# Patient Record
Sex: FEMALE | Race: WHITE | NOT HISPANIC OR LATINO | Employment: OTHER | URBAN - METROPOLITAN AREA
[De-identification: names, ages, dates, MRNs, and addresses within clinical notes are randomized per-mention and may not be internally consistent; named-entity substitution may affect disease eponyms.]

---

## 2021-03-02 ENCOUNTER — IMMUNIZATIONS (OUTPATIENT)
Dept: FAMILY MEDICINE CLINIC | Facility: HOSPITAL | Age: 76
End: 2021-03-02

## 2021-03-02 DIAGNOSIS — Z23 ENCOUNTER FOR IMMUNIZATION: Primary | ICD-10-CM

## 2021-03-02 PROCEDURE — 0001A SARS-COV-2 / COVID-19 MRNA VACCINE (PFIZER-BIONTECH) 30 MCG: CPT

## 2021-03-02 PROCEDURE — 91300 SARS-COV-2 / COVID-19 MRNA VACCINE (PFIZER-BIONTECH) 30 MCG: CPT

## 2021-03-08 ENCOUNTER — OFFICE VISIT (OUTPATIENT)
Dept: OBGYN CLINIC | Facility: CLINIC | Age: 76
End: 2021-03-08
Payer: MEDICARE

## 2021-03-08 VITALS
WEIGHT: 161 LBS | HEART RATE: 98 BPM | SYSTOLIC BLOOD PRESSURE: 151 MMHG | DIASTOLIC BLOOD PRESSURE: 78 MMHG | HEIGHT: 62 IN | BODY MASS INDEX: 29.63 KG/M2

## 2021-03-08 DIAGNOSIS — M25.511 ACUTE PAIN OF RIGHT SHOULDER: Primary | ICD-10-CM

## 2021-03-08 PROBLEM — G89.29 CHRONIC RIGHT SHOULDER PAIN: Status: ACTIVE | Noted: 2021-03-08

## 2021-03-08 PROCEDURE — 99203 OFFICE O/P NEW LOW 30 MIN: CPT | Performed by: PHYSICAL MEDICINE & REHABILITATION

## 2021-03-08 RX ORDER — METOPROLOL SUCCINATE 50 MG/1
50 TABLET, EXTENDED RELEASE ORAL
COMMUNITY
Start: 2020-12-28

## 2021-03-08 RX ORDER — BACITRACIN, NEOMYCIN, POLYMYXIN B 400; 3.5; 5 [USP'U]/G; MG/G; [USP'U]/G
OINTMENT TOPICAL 2 TIMES DAILY
COMMUNITY
End: 2021-10-13

## 2021-03-08 RX ORDER — ALPRAZOLAM 0.5 MG/1
0.5 TABLET ORAL AS NEEDED
COMMUNITY
Start: 2020-12-15

## 2021-03-08 RX ORDER — ATORVASTATIN CALCIUM 20 MG/1
20 TABLET, FILM COATED ORAL
COMMUNITY
Start: 2020-11-06

## 2021-03-08 RX ORDER — DIPHENOXYLATE HYDROCHLORIDE AND ATROPINE SULFATE 2.5; .025 MG/1; MG/1
1 TABLET ORAL DAILY
COMMUNITY
End: 2021-11-11 | Stop reason: HOSPADM

## 2021-03-08 RX ORDER — LEVOTHYROXINE SODIUM 0.03 MG/1
25 TABLET ORAL
COMMUNITY
Start: 2020-10-23

## 2021-03-08 RX ORDER — DOXYCYCLINE HYCLATE 50 MG/1
50 CAPSULE ORAL 2 TIMES DAILY
COMMUNITY
End: 2021-10-13

## 2021-03-08 RX ORDER — NABUMETONE 750 MG/1
750 TABLET, FILM COATED ORAL 2 TIMES DAILY
COMMUNITY
Start: 2020-10-27 | End: 2021-10-13

## 2021-03-08 NOTE — PROGRESS NOTES
1  Acute pain of right shoulder       No orders of the defined types were placed in this encounter  Impression:  Right shoulder pain likely secondary to subacromial impingement syndrome  Patient is doing quite well  She is doing well enough that we decided to hold off on formal physical therapy  She will continue with home exercise program   I will see her back in 3 weeks to reassess  Return in about 3 weeks (around 3/29/2021)  HPI:  Manju Otero is a 66 y o  female  who presents for evaluation of   Chief Complaint   Patient presents with    Right Shoulder - Pain       Onset/Mechanism: Pain started early March after a fall onto her outstretched shoulder  Location: Anterolateral shoulder  Radiation: Denies  Provocative: Overhead activities before but now feels 100%  Severity: Feels a lot better  Associated Symptoms: No limitations now  Treatment so far: Nabumetone which makes her feel 100%  Review of Systems   Constitutional: Positive for activity change  Negative for fever  HENT: Negative for sore throat  Eyes: Negative for visual disturbance  Respiratory: Negative for shortness of breath  Cardiovascular: Negative for chest pain  Gastrointestinal: Negative for abdominal pain  Endocrine: Negative for polydipsia  Genitourinary: Negative for difficulty urinating  Musculoskeletal: Positive for arthralgias  Skin: Negative for rash  Allergic/Immunologic: Negative for immunocompromised state  Neurological: Negative for numbness  Hematological: Does not bruise/bleed easily  Psychiatric/Behavioral: Negative for confusion  Following history reviewed and updated:  History reviewed  No pertinent past medical history  History reviewed  No pertinent surgical history    Social History   Social History     Substance and Sexual Activity   Alcohol Use None     Social History     Substance and Sexual Activity   Drug Use Not on file     Social History     Tobacco Use   Smoking Status Former Smoker   Smokeless Tobacco Never Used     History reviewed  No pertinent family history  No Known Allergies     Constitutional:  /78   Pulse 98   Ht 5' 2" (1 575 m)   Wt 73 kg (161 lb)   BMI 29 45 kg/m²    General: NAD  Eyes: Anicteric sclerae  Neck: Supple  Lungs: Unlabored breathing  Cardiovascular: No lower extremity edema  Skin: Intact without erythema  Neurologic: Sensation intact to light touch  Psychiatric: Mood and affect are appropriate  Right Shoulder Exam     Tenderness   The patient is experiencing tenderness in the acromion  Range of Motion   Active abduction: normal   Passive abduction: normal   Extension: abnormal   Forward flexion: normal   Internal rotation 0 degrees: abnormal     Tests   De Paz test: positive  Impingement: positive    Other   Erythema: absent  Scars: absent  Sensation: normal  Pulse: present             Procedures    Imaging Studies (I personally reviewed images in PACS and report):  Right shoulder x-rays most recent to this encounter reviewed  These images show mild glenohumeral joint osteoarthritis  No acute osseous abnormalities

## 2021-03-23 ENCOUNTER — IMMUNIZATIONS (OUTPATIENT)
Dept: FAMILY MEDICINE CLINIC | Facility: HOSPITAL | Age: 76
End: 2021-03-23

## 2021-03-23 DIAGNOSIS — Z23 ENCOUNTER FOR IMMUNIZATION: Primary | ICD-10-CM

## 2021-03-23 PROCEDURE — 91300 SARS-COV-2 / COVID-19 MRNA VACCINE (PFIZER-BIONTECH) 30 MCG: CPT

## 2021-03-23 PROCEDURE — 0002A SARS-COV-2 / COVID-19 MRNA VACCINE (PFIZER-BIONTECH) 30 MCG: CPT

## 2021-09-28 ENCOUNTER — OFFICE VISIT (OUTPATIENT)
Dept: NEUROSURGERY | Facility: CLINIC | Age: 76
End: 2021-09-28
Payer: MEDICARE

## 2021-09-28 VITALS
DIASTOLIC BLOOD PRESSURE: 93 MMHG | WEIGHT: 166 LBS | RESPIRATION RATE: 16 BRPM | SYSTOLIC BLOOD PRESSURE: 156 MMHG | BODY MASS INDEX: 30.55 KG/M2 | HEIGHT: 62 IN | HEART RATE: 89 BPM | TEMPERATURE: 99.2 F

## 2021-09-28 DIAGNOSIS — M43.16 SPONDYLOLISTHESIS OF LUMBAR REGION: Primary | ICD-10-CM

## 2021-09-28 DIAGNOSIS — M48.062 NEUROGENIC CLAUDICATION DUE TO LUMBAR SPINAL STENOSIS: ICD-10-CM

## 2021-09-28 DIAGNOSIS — M54.50 LOWER BACK PAIN: ICD-10-CM

## 2021-09-28 DIAGNOSIS — M48.061 SPINAL STENOSIS AT L4-L5 LEVEL: ICD-10-CM

## 2021-09-28 DIAGNOSIS — M41.9 SCOLIOSIS DEFORMITY OF SPINE: ICD-10-CM

## 2021-09-28 PROCEDURE — 99203 OFFICE O/P NEW LOW 30 MIN: CPT | Performed by: NEUROLOGICAL SURGERY

## 2021-09-28 RX ORDER — CEFAZOLIN SODIUM 2 G/50ML
2000 SOLUTION INTRAVENOUS ONCE
Status: CANCELLED | OUTPATIENT
Start: 2021-09-28 | End: 2021-09-28

## 2021-09-28 NOTE — PROGRESS NOTES
DISCUSSION SUMMARY  This is a 64 y o  female with severe low back pain and leg pain which is sometimes greater on the right and sometimes greater on the left  Imaging studies demonstrate spondylolisthesis with severe spinal stenosis  I have recommended that she undergo a decompression of procedure as well as a fusion  She has failed conservative measures  The risks, benefits and complications of surgery were explained in detail to Vick Leyva and her  including hemorrhage, infection, CSF leakage, wound problems, pain, weakness, numbness, dysesthesiae, paralysis, coma, and death  Also, the possibility  of further surgery being required was emphasized  Other potential medical complications were outlined, including deep venous thrombosis, pulmonary embolism, pneumonia, urinary tract infection, myocardial infarction,  and stroke  The need for physical therapy, occupational therapy, and rehabilitation was also mentioned  The alternatives to surgery were discussed  Vick Leyva her  asked relevant questions and asked that we proceed with arrangements for surgery  Return for Surgical intervention  Diagnosis ICD-10-CM Associated Orders   1  Spondylolisthesis of lumbar region  M43 16 Case request operating room: Minimally invasive transverse lumbar interbody fusion L4-5 left-sided approach with decompressive laminectomy     PAT Covid Screening     Case request operating room: Minimally invasive transverse lumbar interbody fusion L4-5 left-sided approach with decompressive laminectomy   2  Lower back pain  M54 5 Ambulatory referral to Neurosurgery     Case request operating room: Minimally invasive transverse lumbar interbody fusion L4-5 left-sided approach with decompressive laminectomy     PAT Covid Screening     Case request operating room: Minimally invasive transverse lumbar interbody fusion L4-5 left-sided approach with decompressive laminectomy   3   Spinal stenosis at L4-L5 level  M48 061 Case request operating room: Minimally invasive transverse lumbar interbody fusion L4-5 left-sided approach with decompressive laminectomy     PAT Covid Screening     Case request operating room: Minimally invasive transverse lumbar interbody fusion L4-5 left-sided approach with decompressive laminectomy   4  Neurogenic claudication due to lumbar spinal stenosis  M48 062 Case request operating room: Minimally invasive transverse lumbar interbody fusion L4-5 left-sided approach with decompressive laminectomy     PAT Covid Screening     Case request operating room: Minimally invasive transverse lumbar interbody fusion L4-5 left-sided approach with decompressive laminectomy   5  Scoliosis deformity of spine  M41 9 Case request operating room: Minimally invasive transverse lumbar interbody fusion L4-5 left-sided approach with decompressive laminectomy     PAT Covid Screening     Case request operating room: Minimally invasive transverse lumbar interbody fusion L4-5 left-sided approach with decompressive laminectomy          Chief Complaint   Patient presents with    Consult     NP for 2nd Opinion regarding Lower back pain; MRI L-Spine 6/29/21 uploaded to PACS       HPI  This is a 53-year-old female who complains of low back pain with pain radiating into the left and right leg  Her leg has given out on multiple occasions and her  has had to stop her from falling  Her pain radiates into the right leg but is well in the left leg  Her left leg is weaker than the right  The pain begins 1st thing in the morning when she gets out of bed and puts pressure on her leg but also at the end of the day and during significant walks  She does have back pain as well as back pain with radiation  Walking causes increased leg weakness over short periods of time  She has tried epidural steroid injections which were ineffective for her  She has tried physical therapy which also produced no relief      Review of Systems   Constitutional: Positive for activity change (decrease due to pain, relies on  a lot)  HENT: Positive for hearing loss (b/l hearing loss and wears earing aids) and trouble swallowing (occasional )  Eyes: Positive for photophobia (sensitivity to bright sunlight)  Respiratory: Negative  Cardiovascular: Negative  Gastrointestinal: Negative  Endocrine: Positive for heat intolerance  Genitourinary: Positive for frequency  Musculoskeletal: Positive for arthralgias (arthritis in lower back), back pain (intermittent, b/l lower back pain and pressure which radiates to b/l buttocks and BLE; worsens with sitting and physical activity ; has some relief when laying down ), gait problem (difficulty walking due to B/l leg weakness and pain ) and myalgias (posterior B/l legs R>L)  Patient has done WALTER twice and reported no relief  Patient has done (2 months) PT and reports no relief    Skin: Negative  Allergic/Immunologic: Negative  Neurological: Positive for weakness (intermittent BLE)  Negative for speech difficulty (slight due to hearing loss)  Hematological: Negative  Psychiatric/Behavioral: Positive for confusion (STM loss)  I reviewed the ROS      Vitals:    /93 (BP Location: Left arm, Patient Position: Sitting, Cuff Size: Standard)   Pulse 89   Temp 99 2 °F (37 3 °C) (Probe)   Resp 16   Ht 5' 2" (1 575 m)   Wt 75 3 kg (166 lb)   BMI 30 36 kg/m²       MEDICAL HISTORY  Past Medical History:   Diagnosis Date    Low back pain     Tubal pregnancy      Past Surgical History:   Procedure Laterality Date    NEUROPLASTY / TRANSPOSITION MEDIAN NERVE AT CARPAL TUNNEL BILATERAL      OSTEOTOMY TOES Left     2 and 3rd phalanges     Social History     Tobacco Use    Smoking status: Former Smoker    Smokeless tobacco: Never Used    Tobacco comment: quit 40 years ago   Vaping Use    Vaping Use: Never used   Substance Use Topics    Alcohol use: Not Currently    Drug use: Not Currently        Current Outpatient Medications:     alendronate-cholecalciferol (FOSAMAX PLUS D)  MG-UNIT per tablet, Take 1 tablet by mouth Once a week, Disp: , Rfl:     ALPRAZolam (XANAX) 0 5 mg tablet, TAKE 1 TABLET BY MOUTH  NIGHTLY AS NEEDED FOR  ANXIETY, Disp: , Rfl:     atorvastatin (LIPITOR) 20 mg tablet, Take 20 mg by mouth daily, Disp: , Rfl:     Calcium Acetate, Phos Binder, (CALCIUM ACETATE PO), Take 1 tablet by mouth daily, Disp: , Rfl:     Cholecalciferol 10 MCG (400 UNIT) CAPS, Take 1 tablet by mouth daily, Disp: , Rfl:     levothyroxine 25 mcg tablet, TAKE 1 TABLET BY MOUTH  DAILY INDICATIONS: A  CONDITION WITH LOW THYROID  HORMONE LEVELS , Disp: , Rfl:     metoprolol succinate (TOPROL-XL) 50 mg 24 hr tablet, Take 50 mg by mouth daily, Disp: , Rfl:     multivitamin (THERAGRAN) TABS, Take 1 tablet by mouth daily, Disp: , Rfl:     diclofenac sodium (VOLTAREN) 50 mg EC tablet, TAKE ONE (1) TABLET BY MOUTH TWICE DAILY AS NEEDED FOR PAIN (Patient not taking: Reported on 9/28/2021), Disp: , Rfl:     doxycycline hyclate (VIBRAMYCIN) 50 mg capsule, Take 50 mg by mouth 2 (two) times a day (Patient not taking: Reported on 9/28/2021), Disp: , Rfl:     nabumetone (RELAFEN) 750 mg tablet, Take 750 mg by mouth 2 (two) times a day (Patient not taking: Reported on 9/28/2021), Disp: , Rfl:     neomycin-bacitracin-polymyxin b (NEOSPORIN) ointment, Apply topically 2 (two) times a day (Patient not taking: Reported on 9/28/2021), Disp: , Rfl:    No Known Allergies     The following portions of the patient's history were updated by MA and reviewed by MD: allergies, current medications, past family history, past medical history, past social history, past surgical history and problem list       Physical Exam  Vitals and nursing note reviewed  Constitutional:       General: She is not in acute distress  Appearance: Normal appearance   She is not ill-appearing, toxic-appearing or diaphoretic  HENT:      Head: Normocephalic  Nose: Nose normal    Eyes:      Extraocular Movements: Extraocular movements intact  Pupils: Pupils are equal, round, and reactive to light  Cardiovascular:      Rate and Rhythm: Normal rate and regular rhythm  Pulses: Normal pulses  Heart sounds: Normal heart sounds  No murmur heard  No friction rub  No gallop  Pulmonary:      Effort: Pulmonary effort is normal  No respiratory distress  Breath sounds: Normal breath sounds  No stridor  No wheezing, rhonchi or rales  Chest:      Chest wall: No tenderness  Abdominal:      General: Abdomen is flat  There is no distension  Palpations: Abdomen is soft  There is no mass  Tenderness: There is no abdominal tenderness  There is no guarding or rebound  Hernia: No hernia is present  Musculoskeletal:         General: No swelling, tenderness, deformity or signs of injury  Cervical back: Normal range of motion and neck supple  No rigidity  Right lower leg: No edema  Left lower leg: No edema  Comments: She has a straight-leg-raising sign on the left side she has no straight leg raising sign on the right   Lymphadenopathy:      Cervical: No cervical adenopathy  Skin:     General: Skin is warm and dry  Coloration: Skin is not jaundiced  Findings: No erythema or rash  Neurological:      Mental Status: She is alert and oriented to person, place, and time  Cranial Nerves: No cranial nerve deficit  Sensory: Sensory deficit ( there is reduced deep vibratory sensation at the left ankle and at the right   The left side is worse than the right ) present  Motor: No weakness (She cannot perform tandem gait)  Coordination: Coordination normal       Gait: Gait abnormal       Deep Tendon Reflexes: Reflexes abnormal    Psychiatric:         Mood and Affect: Mood normal          Behavior: Behavior normal          Thought Content:  Thought content normal          Judgment: Judgment normal            RESULTS/DATA  I have personally reviewed pertinent films in PACS   MRI of the LS spine demonstrates degenerative disc disease with a degenerative spondylolisthesis at the L4-5 level  There is an auto fusion present at the L5-S1 level  Various degrees of lumbar spondylosis and degenerative disease are also present  There is a scoliosis present also

## 2021-09-30 ENCOUNTER — APPOINTMENT (OUTPATIENT)
Dept: LAB | Facility: HOSPITAL | Age: 76
End: 2021-09-30
Payer: MEDICARE

## 2021-09-30 ENCOUNTER — LAB REQUISITION (OUTPATIENT)
Dept: LAB | Facility: HOSPITAL | Age: 76
End: 2021-09-30
Payer: MEDICARE

## 2021-09-30 DIAGNOSIS — M43.16 SPONDYLOLISTHESIS OF LUMBAR REGION: ICD-10-CM

## 2021-09-30 DIAGNOSIS — M48.062 NEUROGENIC CLAUDICATION DUE TO LUMBAR SPINAL STENOSIS: ICD-10-CM

## 2021-09-30 DIAGNOSIS — M54.50 LOWER BACK PAIN: ICD-10-CM

## 2021-09-30 DIAGNOSIS — Z01.818 OTHER SPECIFIED PRE-OPERATIVE EXAMINATION: ICD-10-CM

## 2021-09-30 DIAGNOSIS — M41.9 SCOLIOSIS DEFORMITY OF SPINE: ICD-10-CM

## 2021-09-30 DIAGNOSIS — Z01.818 ENCOUNTER FOR OTHER PREPROCEDURAL EXAMINATION: ICD-10-CM

## 2021-09-30 DIAGNOSIS — M48.061 SPINAL STENOSIS AT L4-L5 LEVEL: ICD-10-CM

## 2021-09-30 LAB
ABO GROUP BLD: NORMAL
ALBUMIN SERPL BCP-MCNC: 4.3 G/DL (ref 3.4–4.8)
ALP SERPL-CCNC: 79.2 U/L (ref 35–140)
ALT SERPL W P-5'-P-CCNC: 24 U/L (ref 5–54)
ANION GAP SERPL CALCULATED.3IONS-SCNC: 8 MMOL/L (ref 4–13)
APTT PPP: 30 SECONDS (ref 23–37)
AST SERPL W P-5'-P-CCNC: 16 U/L (ref 15–41)
BASOPHILS # BLD AUTO: 0.03 THOUSANDS/ΜL (ref 0–0.1)
BASOPHILS NFR BLD AUTO: 0 % (ref 0–1)
BILIRUB SERPL-MCNC: 0.41 MG/DL (ref 0.3–1.2)
BLD GP AB SCN SERPL QL: NEGATIVE
BUN SERPL-MCNC: 14 MG/DL (ref 6–20)
CALCIUM SERPL-MCNC: 9.4 MG/DL (ref 8.4–10.2)
CHLORIDE SERPL-SCNC: 104 MMOL/L (ref 96–108)
CO2 SERPL-SCNC: 28 MMOL/L (ref 22–33)
CREAT SERPL-MCNC: 0.79 MG/DL (ref 0.4–1.1)
EOSINOPHIL # BLD AUTO: 0.18 THOUSAND/ΜL (ref 0–0.61)
EOSINOPHIL NFR BLD AUTO: 3 % (ref 0–6)
ERYTHROCYTE [DISTWIDTH] IN BLOOD BY AUTOMATED COUNT: 12.7 % (ref 11.6–15.1)
EST. AVERAGE GLUCOSE BLD GHB EST-MCNC: 137 MG/DL
GFR SERPL CREATININE-BSD FRML MDRD: 72 ML/MIN/1.73SQ M
GLUCOSE P FAST SERPL-MCNC: 127 MG/DL (ref 70–105)
HBA1C MFR BLD: 6.4 %
HCT VFR BLD AUTO: 42.4 % (ref 34.8–46.1)
HGB BLD-MCNC: 14.2 G/DL (ref 11.5–15.4)
IMM GRANULOCYTES # BLD AUTO: 0.02 THOUSAND/UL (ref 0–0.2)
IMM GRANULOCYTES NFR BLD AUTO: 0 % (ref 0–2)
INR PPP: 1.02 (ref 0.84–1.19)
LYMPHOCYTES # BLD AUTO: 1.81 THOUSANDS/ΜL (ref 0.6–4.47)
LYMPHOCYTES NFR BLD AUTO: 27 % (ref 14–44)
MCH RBC QN AUTO: 28.3 PG (ref 26.8–34.3)
MCHC RBC AUTO-ENTMCNC: 33.5 G/DL (ref 31.4–37.4)
MCV RBC AUTO: 85 FL (ref 82–98)
MONOCYTES # BLD AUTO: 0.55 THOUSAND/ΜL (ref 0.17–1.22)
MONOCYTES NFR BLD AUTO: 8 % (ref 4–12)
NEUTROPHILS # BLD AUTO: 4.1 THOUSANDS/ΜL (ref 1.85–7.62)
NEUTS SEG NFR BLD AUTO: 62 % (ref 43–75)
PLATELET # BLD AUTO: 265 THOUSANDS/UL (ref 149–390)
PMV BLD AUTO: 10.3 FL (ref 8.9–12.7)
POTASSIUM SERPL-SCNC: 4 MMOL/L (ref 3.5–5)
PROT SERPL-MCNC: 6.9 G/DL (ref 6.4–8.3)
PROTHROMBIN TIME: 13.3 SECONDS (ref 11.6–14.5)
RBC # BLD AUTO: 5.01 MILLION/UL (ref 3.81–5.12)
RH BLD: POSITIVE
SODIUM SERPL-SCNC: 140 MMOL/L (ref 133–145)
SPECIMEN EXPIRATION DATE: NORMAL
WBC # BLD AUTO: 6.69 THOUSAND/UL (ref 4.31–10.16)

## 2021-09-30 PROCEDURE — 36415 COLL VENOUS BLD VENIPUNCTURE: CPT

## 2021-09-30 PROCEDURE — 86900 BLOOD TYPING SEROLOGIC ABO: CPT | Performed by: NEUROLOGICAL SURGERY

## 2021-09-30 PROCEDURE — 85730 THROMBOPLASTIN TIME PARTIAL: CPT

## 2021-09-30 PROCEDURE — 86901 BLOOD TYPING SEROLOGIC RH(D): CPT | Performed by: NEUROLOGICAL SURGERY

## 2021-09-30 PROCEDURE — 85610 PROTHROMBIN TIME: CPT

## 2021-09-30 PROCEDURE — 80053 COMPREHEN METABOLIC PANEL: CPT

## 2021-09-30 PROCEDURE — 83036 HEMOGLOBIN GLYCOSYLATED A1C: CPT

## 2021-09-30 PROCEDURE — 86850 RBC ANTIBODY SCREEN: CPT | Performed by: NEUROLOGICAL SURGERY

## 2021-09-30 PROCEDURE — 85025 COMPLETE CBC W/AUTO DIFF WBC: CPT

## 2021-10-05 ENCOUNTER — ANESTHESIA EVENT (OUTPATIENT)
Dept: PERIOP | Facility: HOSPITAL | Age: 76
DRG: 460 | End: 2021-10-05
Payer: MEDICARE

## 2021-10-14 ENCOUNTER — APPOINTMENT (OUTPATIENT)
Dept: LAB | Facility: CLINIC | Age: 76
End: 2021-10-14
Payer: MEDICARE

## 2021-10-14 PROCEDURE — 87081 CULTURE SCREEN ONLY: CPT

## 2021-10-16 LAB — MRSA NOSE QL CULT: NORMAL

## 2021-10-29 ENCOUNTER — DOCUMENTATION (OUTPATIENT)
Dept: NEUROSURGERY | Facility: CLINIC | Age: 76
End: 2021-10-29

## 2021-11-01 ENCOUNTER — HOSPITAL ENCOUNTER (INPATIENT)
Facility: HOSPITAL | Age: 76
LOS: 3 days | Discharge: HOME | DRG: 460 | End: 2021-11-05
Attending: NEUROLOGICAL SURGERY | Admitting: NEUROLOGICAL SURGERY
Payer: MEDICARE

## 2021-11-01 ENCOUNTER — ANESTHESIA (OUTPATIENT)
Dept: PERIOP | Facility: HOSPITAL | Age: 76
DRG: 460 | End: 2021-11-01
Payer: MEDICARE

## 2021-11-01 ENCOUNTER — APPOINTMENT (OUTPATIENT)
Dept: RADIOLOGY | Facility: HOSPITAL | Age: 76
DRG: 460 | End: 2021-11-01
Payer: MEDICARE

## 2021-11-01 DIAGNOSIS — M48.061 SPINAL STENOSIS AT L4-L5 LEVEL: Primary | ICD-10-CM

## 2021-11-01 LAB
ABO GROUP BLD: NORMAL
BLD GP AB SCN SERPL QL: NEGATIVE
GLUCOSE SERPL-MCNC: 109 MG/DL (ref 65–140)
GLUCOSE SERPL-MCNC: 130 MG/DL (ref 65–140)
RH BLD: POSITIVE
SPECIMEN EXPIRATION DATE: NORMAL

## 2021-11-01 PROCEDURE — 22633 ARTHRD CMBN 1NTRSPC LUMBAR: CPT | Performed by: NEUROLOGICAL SURGERY

## 2021-11-01 PROCEDURE — 0SG10AJ FUSION OF 2 OR MORE LUMBAR VERTEBRAL JOINTS WITH INTERBODY FUSION DEVICE, POSTERIOR APPROACH, ANTERIOR COLUMN, OPEN APPROACH: ICD-10-PCS | Performed by: NEUROLOGICAL SURGERY

## 2021-11-01 PROCEDURE — 86900 BLOOD TYPING SEROLOGIC ABO: CPT | Performed by: NEUROLOGICAL SURGERY

## 2021-11-01 PROCEDURE — 86850 RBC ANTIBODY SCREEN: CPT | Performed by: NEUROLOGICAL SURGERY

## 2021-11-01 PROCEDURE — 86901 BLOOD TYPING SEROLOGIC RH(D): CPT | Performed by: NEUROLOGICAL SURGERY

## 2021-11-01 PROCEDURE — C1713 ANCHOR/SCREW BN/BN,TIS/BN: HCPCS | Performed by: NEUROLOGICAL SURGERY

## 2021-11-01 PROCEDURE — 82948 REAGENT STRIP/BLOOD GLUCOSE: CPT

## 2021-11-01 PROCEDURE — 22840 INSERT SPINE FIXATION DEVICE: CPT | Performed by: NEUROLOGICAL SURGERY

## 2021-11-01 PROCEDURE — 22853 INSJ BIOMECHANICAL DEVICE: CPT | Performed by: NEUROLOGICAL SURGERY

## 2021-11-01 PROCEDURE — 20930 SP BONE ALGRFT MORSEL ADD-ON: CPT | Performed by: NEUROLOGICAL SURGERY

## 2021-11-01 PROCEDURE — C1769 GUIDE WIRE: HCPCS | Performed by: NEUROLOGICAL SURGERY

## 2021-11-01 PROCEDURE — 72100 X-RAY EXAM L-S SPINE 2/3 VWS: CPT

## 2021-11-01 DEVICE — DBM T45005 5CC PASTE GRAFTON PLUS
Type: IMPLANTABLE DEVICE | Site: SPINE LUMBAR | Status: FUNCTIONAL
Brand: GRAFTON®AND GRAFTON PLUS®DEMINERALIZED BONE MATRIX (DBM)

## 2021-11-01 DEVICE — SCREW SET 4.75MM SOLERA PERC: Type: IMPLANTABLE DEVICE | Site: SPINE LUMBAR | Status: FUNCTIONAL

## 2021-11-01 DEVICE — SPACER 7770723 ELEVATE X-LOR 23X7MM
Type: IMPLANTABLE DEVICE | Site: SPINE LUMBAR | Status: FUNCTIONAL
Brand: ELEVATE™ SPINAL SYSTEM

## 2021-11-01 DEVICE — MAS 54850016545 4.75 EXT TAB CANN 6.5X45
Type: IMPLANTABLE DEVICE | Site: SPINE LUMBAR | Status: FUNCTIONAL
Brand: CD HORIZON® SOLERA® SPINAL SYSTEM

## 2021-11-01 RX ORDER — METHOCARBAMOL 750 MG/1
750 TABLET, FILM COATED ORAL 4 TIMES DAILY PRN
Status: DISCONTINUED | OUTPATIENT
Start: 2021-11-01 | End: 2021-11-03

## 2021-11-01 RX ORDER — ONDANSETRON 2 MG/ML
4 INJECTION INTRAMUSCULAR; INTRAVENOUS ONCE AS NEEDED
Status: DISCONTINUED | OUTPATIENT
Start: 2021-11-01 | End: 2021-11-01 | Stop reason: HOSPADM

## 2021-11-01 RX ORDER — CEFAZOLIN SODIUM 2 G/50ML
2000 SOLUTION INTRAVENOUS EVERY 8 HOURS
Status: COMPLETED | OUTPATIENT
Start: 2021-11-01 | End: 2021-11-02

## 2021-11-01 RX ORDER — SENNOSIDES 8.6 MG
1 TABLET ORAL DAILY
Status: DISCONTINUED | OUTPATIENT
Start: 2021-11-01 | End: 2021-11-05 | Stop reason: HOSPADM

## 2021-11-01 RX ORDER — LIDOCAINE HYDROCHLORIDE AND EPINEPHRINE 10; 10 MG/ML; UG/ML
INJECTION, SOLUTION INFILTRATION; PERINEURAL AS NEEDED
Status: DISCONTINUED | OUTPATIENT
Start: 2021-11-01 | End: 2021-11-01 | Stop reason: HOSPADM

## 2021-11-01 RX ORDER — HEPARIN SODIUM 5000 [USP'U]/ML
5000 INJECTION, SOLUTION INTRAVENOUS; SUBCUTANEOUS EVERY 8 HOURS SCHEDULED
Status: DISCONTINUED | OUTPATIENT
Start: 2021-11-02 | End: 2021-11-05 | Stop reason: HOSPADM

## 2021-11-01 RX ORDER — CEFAZOLIN SODIUM 2 G/50ML
2000 SOLUTION INTRAVENOUS ONCE
Status: COMPLETED | OUTPATIENT
Start: 2021-11-01 | End: 2021-11-01

## 2021-11-01 RX ORDER — OXYCODONE HYDROCHLORIDE 5 MG/1
5 TABLET ORAL EVERY 4 HOURS PRN
Status: DISCONTINUED | OUTPATIENT
Start: 2021-11-01 | End: 2021-11-05 | Stop reason: HOSPADM

## 2021-11-01 RX ORDER — METOPROLOL TARTRATE 5 MG/5ML
INJECTION INTRAVENOUS AS NEEDED
Status: DISCONTINUED | OUTPATIENT
Start: 2021-11-01 | End: 2021-11-01

## 2021-11-01 RX ORDER — PROPOFOL 10 MG/ML
INJECTION, EMULSION INTRAVENOUS AS NEEDED
Status: DISCONTINUED | OUTPATIENT
Start: 2021-11-01 | End: 2021-11-01

## 2021-11-01 RX ORDER — ALPRAZOLAM 0.5 MG/1
0.5 TABLET ORAL
Status: DISCONTINUED | OUTPATIENT
Start: 2021-11-01 | End: 2021-11-05 | Stop reason: HOSPADM

## 2021-11-01 RX ORDER — ONDANSETRON 2 MG/ML
4 INJECTION INTRAMUSCULAR; INTRAVENOUS EVERY 6 HOURS PRN
Status: DISCONTINUED | OUTPATIENT
Start: 2021-11-01 | End: 2021-11-05 | Stop reason: HOSPADM

## 2021-11-01 RX ORDER — SODIUM CHLORIDE, SODIUM LACTATE, POTASSIUM CHLORIDE, CALCIUM CHLORIDE 600; 310; 30; 20 MG/100ML; MG/100ML; MG/100ML; MG/100ML
INJECTION, SOLUTION INTRAVENOUS CONTINUOUS PRN
Status: DISCONTINUED | OUTPATIENT
Start: 2021-11-01 | End: 2021-11-01

## 2021-11-01 RX ORDER — ACETAMINOPHEN 325 MG/1
975 TABLET ORAL EVERY 8 HOURS SCHEDULED
Status: DISCONTINUED | OUTPATIENT
Start: 2021-11-01 | End: 2021-11-05 | Stop reason: HOSPADM

## 2021-11-01 RX ORDER — SUCCINYLCHOLINE/SOD CL,ISO/PF 100 MG/5ML
SYRINGE (ML) INTRAVENOUS AS NEEDED
Status: DISCONTINUED | OUTPATIENT
Start: 2021-11-01 | End: 2021-11-01

## 2021-11-01 RX ORDER — LEVOTHYROXINE SODIUM 0.03 MG/1
25 TABLET ORAL
Status: DISCONTINUED | OUTPATIENT
Start: 2021-11-01 | End: 2021-11-05 | Stop reason: HOSPADM

## 2021-11-01 RX ORDER — OXYCODONE HYDROCHLORIDE 5 MG/1
10 TABLET ORAL EVERY 4 HOURS PRN
Status: DISCONTINUED | OUTPATIENT
Start: 2021-11-01 | End: 2021-11-05 | Stop reason: HOSPADM

## 2021-11-01 RX ORDER — METOPROLOL SUCCINATE 50 MG/1
50 TABLET, EXTENDED RELEASE ORAL
Status: DISCONTINUED | OUTPATIENT
Start: 2021-11-01 | End: 2021-11-05 | Stop reason: HOSPADM

## 2021-11-01 RX ORDER — LIDOCAINE HYDROCHLORIDE 10 MG/ML
INJECTION, SOLUTION EPIDURAL; INFILTRATION; INTRACAUDAL; PERINEURAL AS NEEDED
Status: DISCONTINUED | OUTPATIENT
Start: 2021-11-01 | End: 2021-11-01

## 2021-11-01 RX ORDER — SODIUM CHLORIDE, SODIUM LACTATE, POTASSIUM CHLORIDE, CALCIUM CHLORIDE 600; 310; 30; 20 MG/100ML; MG/100ML; MG/100ML; MG/100ML
75 INJECTION, SOLUTION INTRAVENOUS CONTINUOUS
Status: DISPENSED | OUTPATIENT
Start: 2021-11-01 | End: 2021-11-01

## 2021-11-01 RX ORDER — HYDROMORPHONE HCL/PF 1 MG/ML
0.5 SYRINGE (ML) INJECTION
Status: DISCONTINUED | OUTPATIENT
Start: 2021-11-01 | End: 2021-11-02

## 2021-11-01 RX ORDER — SODIUM CHLORIDE 9 MG/ML
75 INJECTION, SOLUTION INTRAVENOUS CONTINUOUS
Status: DISCONTINUED | OUTPATIENT
Start: 2021-11-01 | End: 2021-11-02

## 2021-11-01 RX ORDER — FENTANYL CITRATE/PF 50 MCG/ML
25 SYRINGE (ML) INJECTION
Status: COMPLETED | OUTPATIENT
Start: 2021-11-01 | End: 2021-11-01

## 2021-11-01 RX ORDER — FENTANYL CITRATE 50 UG/ML
INJECTION, SOLUTION INTRAMUSCULAR; INTRAVENOUS AS NEEDED
Status: DISCONTINUED | OUTPATIENT
Start: 2021-11-01 | End: 2021-11-01

## 2021-11-01 RX ORDER — BUPIVACAINE HYDROCHLORIDE AND EPINEPHRINE 5; 5 MG/ML; UG/ML
INJECTION, SOLUTION EPIDURAL; INTRACAUDAL; PERINEURAL AS NEEDED
Status: DISCONTINUED | OUTPATIENT
Start: 2021-11-01 | End: 2021-11-01 | Stop reason: HOSPADM

## 2021-11-01 RX ORDER — ACETAMINOPHEN 325 MG/1
975 TABLET ORAL ONCE
Status: COMPLETED | OUTPATIENT
Start: 2021-11-01 | End: 2021-11-01

## 2021-11-01 RX ORDER — HYDROMORPHONE HCL 110MG/55ML
PATIENT CONTROLLED ANALGESIA SYRINGE INTRAVENOUS AS NEEDED
Status: DISCONTINUED | OUTPATIENT
Start: 2021-11-01 | End: 2021-11-01

## 2021-11-01 RX ORDER — ATORVASTATIN CALCIUM 20 MG/1
20 TABLET, FILM COATED ORAL
Status: DISCONTINUED | OUTPATIENT
Start: 2021-11-01 | End: 2021-11-05 | Stop reason: HOSPADM

## 2021-11-01 RX ORDER — EPHEDRINE SULFATE 50 MG/ML
INJECTION INTRAVENOUS AS NEEDED
Status: DISCONTINUED | OUTPATIENT
Start: 2021-11-01 | End: 2021-11-01

## 2021-11-01 RX ORDER — PROPOFOL 10 MG/ML
INJECTION, EMULSION INTRAVENOUS CONTINUOUS PRN
Status: DISCONTINUED | OUTPATIENT
Start: 2021-11-01 | End: 2021-11-01

## 2021-11-01 RX ORDER — DEXAMETHASONE SODIUM PHOSPHATE 10 MG/ML
INJECTION, SOLUTION INTRAMUSCULAR; INTRAVENOUS AS NEEDED
Status: DISCONTINUED | OUTPATIENT
Start: 2021-11-01 | End: 2021-11-01

## 2021-11-01 RX ORDER — ONDANSETRON 2 MG/ML
INJECTION INTRAMUSCULAR; INTRAVENOUS AS NEEDED
Status: DISCONTINUED | OUTPATIENT
Start: 2021-11-01 | End: 2021-11-01

## 2021-11-01 RX ORDER — CHLORHEXIDINE GLUCONATE 0.12 MG/ML
15 RINSE ORAL ONCE
Status: COMPLETED | OUTPATIENT
Start: 2021-11-01 | End: 2021-11-01

## 2021-11-01 RX ORDER — METHADONE HYDROCHLORIDE 10 MG/ML
20 INJECTION, SOLUTION INTRAMUSCULAR; INTRAVENOUS; SUBCUTANEOUS ONCE
Status: CANCELLED | OUTPATIENT
Start: 2021-11-01

## 2021-11-01 RX ORDER — SODIUM CHLORIDE 9 MG/ML
INJECTION, SOLUTION INTRAVENOUS CONTINUOUS PRN
Status: DISCONTINUED | OUTPATIENT
Start: 2021-11-01 | End: 2021-11-01

## 2021-11-01 RX ORDER — DOCUSATE SODIUM 100 MG/1
100 CAPSULE, LIQUID FILLED ORAL 2 TIMES DAILY
Status: DISCONTINUED | OUTPATIENT
Start: 2021-11-01 | End: 2021-11-05 | Stop reason: HOSPADM

## 2021-11-01 RX ADMIN — SENNOSIDES 8.6 MG: 8.6 TABLET ORAL at 13:43

## 2021-11-01 RX ADMIN — ALPRAZOLAM 0.25 MG: 0.5 TABLET ORAL at 21:53

## 2021-11-01 RX ADMIN — ATORVASTATIN CALCIUM 20 MG: 20 TABLET, FILM COATED ORAL at 21:53

## 2021-11-01 RX ADMIN — ACETAMINOPHEN 975 MG: 325 TABLET, FILM COATED ORAL at 13:41

## 2021-11-01 RX ADMIN — SODIUM CHLORIDE 75 ML/HR: 0.9 INJECTION, SOLUTION INTRAVENOUS at 21:57

## 2021-11-01 RX ADMIN — Medication 25 MCG: at 11:53

## 2021-11-01 RX ADMIN — REMIFENTANIL HYDROCHLORIDE 0.2 MCG/KG/MIN: 1 INJECTION, POWDER, LYOPHILIZED, FOR SOLUTION INTRAVENOUS at 07:38

## 2021-11-01 RX ADMIN — METOROPROLOL TARTRATE 1 MG: 5 INJECTION, SOLUTION INTRAVENOUS at 08:31

## 2021-11-01 RX ADMIN — DEXAMETHASONE SODIUM PHOSPHATE 10 MG: 10 INJECTION, SOLUTION INTRAMUSCULAR; INTRAVENOUS at 07:34

## 2021-11-01 RX ADMIN — PROPOFOL 120 MCG/KG/MIN: 10 INJECTION, EMULSION INTRAVENOUS at 07:38

## 2021-11-01 RX ADMIN — ONDANSETRON 4 MG: 2 INJECTION INTRAMUSCULAR; INTRAVENOUS at 07:32

## 2021-11-01 RX ADMIN — ACETAMINOPHEN 975 MG: 325 TABLET, FILM COATED ORAL at 06:07

## 2021-11-01 RX ADMIN — CHLORHEXIDINE GLUCONATE 15 ML: 1.2 SOLUTION ORAL at 06:07

## 2021-11-01 RX ADMIN — PHENYLEPHRINE HYDROCHLORIDE 30 MCG/MIN: 10 INJECTION INTRAVENOUS at 07:39

## 2021-11-01 RX ADMIN — CEFAZOLIN SODIUM 2000 MG: 2 SOLUTION INTRAVENOUS at 08:00

## 2021-11-01 RX ADMIN — LIDOCAINE HYDROCHLORIDE 50 MG: 10 INJECTION, SOLUTION EPIDURAL; INFILTRATION; INTRACAUDAL; PERINEURAL at 07:33

## 2021-11-01 RX ADMIN — ACETAMINOPHEN 975 MG: 325 TABLET, FILM COATED ORAL at 21:53

## 2021-11-01 RX ADMIN — METHOCARBAMOL TABLETS 750 MG: 750 TABLET, COATED ORAL at 21:53

## 2021-11-01 RX ADMIN — EPHEDRINE SULFATE 10 MG: 50 INJECTION, SOLUTION INTRAVENOUS at 07:47

## 2021-11-01 RX ADMIN — OXYCODONE HYDROCHLORIDE 5 MG: 5 TABLET ORAL at 19:36

## 2021-11-01 RX ADMIN — SODIUM CHLORIDE 75 ML/HR: 0.9 INJECTION, SOLUTION INTRAVENOUS at 12:26

## 2021-11-01 RX ADMIN — FENTANYL CITRATE 50 MCG: 50 INJECTION INTRAMUSCULAR; INTRAVENOUS at 07:34

## 2021-11-01 RX ADMIN — SODIUM CHLORIDE: 0.9 INJECTION, SOLUTION INTRAVENOUS at 07:38

## 2021-11-01 RX ADMIN — HYDROMORPHONE HYDROCHLORIDE 0.5 MG: 2 INJECTION, SOLUTION INTRAMUSCULAR; INTRAVENOUS; SUBCUTANEOUS at 08:22

## 2021-11-01 RX ADMIN — Medication 25 MCG: at 11:11

## 2021-11-01 RX ADMIN — FENTANYL CITRATE 50 MCG: 50 INJECTION INTRAMUSCULAR; INTRAVENOUS at 07:33

## 2021-11-01 RX ADMIN — SODIUM CHLORIDE, SODIUM LACTATE, POTASSIUM CHLORIDE, AND CALCIUM CHLORIDE: .6; .31; .03; .02 INJECTION, SOLUTION INTRAVENOUS at 07:16

## 2021-11-01 RX ADMIN — Medication 25 MCG: at 12:09

## 2021-11-01 RX ADMIN — METOPROLOL SUCCINATE 50 MG: 50 TABLET, EXTENDED RELEASE ORAL at 21:54

## 2021-11-01 RX ADMIN — CEFAZOLIN SODIUM 2000 MG: 2 SOLUTION INTRAVENOUS at 15:25

## 2021-11-01 RX ADMIN — Medication 25 MCG: at 11:47

## 2021-11-01 RX ADMIN — CEFAZOLIN SODIUM 2000 MG: 2 SOLUTION INTRAVENOUS at 23:17

## 2021-11-01 RX ADMIN — METHOCARBAMOL TABLETS 750 MG: 750 TABLET, COATED ORAL at 13:45

## 2021-11-01 RX ADMIN — PROPOFOL 150 MG: 10 INJECTION, EMULSION INTRAVENOUS at 07:34

## 2021-11-01 RX ADMIN — Medication 100 MG: at 07:38

## 2021-11-02 ENCOUNTER — APPOINTMENT (INPATIENT)
Dept: RADIOLOGY | Facility: HOSPITAL | Age: 76
DRG: 460 | End: 2021-11-02
Payer: MEDICARE

## 2021-11-02 LAB
ANION GAP SERPL CALCULATED.3IONS-SCNC: 4 MMOL/L (ref 4–13)
BUN SERPL-MCNC: 9 MG/DL (ref 5–25)
CALCIUM SERPL-MCNC: 9.1 MG/DL (ref 8.3–10.1)
CHLORIDE SERPL-SCNC: 109 MMOL/L (ref 100–108)
CO2 SERPL-SCNC: 27 MMOL/L (ref 21–32)
CREAT SERPL-MCNC: 0.72 MG/DL (ref 0.6–1.3)
ERYTHROCYTE [DISTWIDTH] IN BLOOD BY AUTOMATED COUNT: 12.5 % (ref 11.6–15.1)
GFR SERPL CREATININE-BSD FRML MDRD: 80 ML/MIN/1.73SQ M
GLUCOSE P FAST SERPL-MCNC: 109 MG/DL (ref 65–99)
GLUCOSE SERPL-MCNC: 109 MG/DL (ref 65–140)
HCT VFR BLD AUTO: 39.5 % (ref 34.8–46.1)
HGB BLD-MCNC: 12.7 G/DL (ref 11.5–15.4)
MCH RBC QN AUTO: 28.5 PG (ref 26.8–34.3)
MCHC RBC AUTO-ENTMCNC: 32.2 G/DL (ref 31.4–37.4)
MCV RBC AUTO: 89 FL (ref 82–98)
PLATELET # BLD AUTO: 224 THOUSANDS/UL (ref 149–390)
PMV BLD AUTO: 10.9 FL (ref 8.9–12.7)
POTASSIUM SERPL-SCNC: 4 MMOL/L (ref 3.5–5.3)
RBC # BLD AUTO: 4.46 MILLION/UL (ref 3.81–5.12)
SODIUM SERPL-SCNC: 140 MMOL/L (ref 136–145)
WBC # BLD AUTO: 13.52 THOUSAND/UL (ref 4.31–10.16)

## 2021-11-02 PROCEDURE — 80048 BASIC METABOLIC PNL TOTAL CA: CPT | Performed by: PHYSICIAN ASSISTANT

## 2021-11-02 PROCEDURE — 97166 OT EVAL MOD COMPLEX 45 MIN: CPT

## 2021-11-02 PROCEDURE — 85027 COMPLETE CBC AUTOMATED: CPT | Performed by: PHYSICIAN ASSISTANT

## 2021-11-02 PROCEDURE — 72100 X-RAY EXAM L-S SPINE 2/3 VWS: CPT

## 2021-11-02 PROCEDURE — 99024 POSTOP FOLLOW-UP VISIT: CPT | Performed by: PHYSICIAN ASSISTANT

## 2021-11-02 PROCEDURE — 97162 PT EVAL MOD COMPLEX 30 MIN: CPT

## 2021-11-02 RX ADMIN — ALPRAZOLAM 0.5 MG: 0.5 TABLET ORAL at 22:18

## 2021-11-02 RX ADMIN — OXYCODONE HYDROCHLORIDE 5 MG: 5 TABLET ORAL at 18:24

## 2021-11-02 RX ADMIN — HEPARIN SODIUM 5000 UNITS: 5000 INJECTION INTRAVENOUS; SUBCUTANEOUS at 22:21

## 2021-11-02 RX ADMIN — METOPROLOL SUCCINATE 50 MG: 50 TABLET, EXTENDED RELEASE ORAL at 22:18

## 2021-11-02 RX ADMIN — ACETAMINOPHEN 975 MG: 325 TABLET, FILM COATED ORAL at 22:17

## 2021-11-02 RX ADMIN — METHOCARBAMOL TABLETS 750 MG: 750 TABLET, COATED ORAL at 05:14

## 2021-11-02 RX ADMIN — ATORVASTATIN CALCIUM 20 MG: 20 TABLET, FILM COATED ORAL at 22:18

## 2021-11-02 RX ADMIN — OXYCODONE HYDROCHLORIDE 10 MG: 5 TABLET ORAL at 22:17

## 2021-11-02 RX ADMIN — OXYCODONE HYDROCHLORIDE 5 MG: 5 TABLET ORAL at 05:14

## 2021-11-02 RX ADMIN — HEPARIN SODIUM 5000 UNITS: 5000 INJECTION INTRAVENOUS; SUBCUTANEOUS at 13:33

## 2021-11-02 RX ADMIN — LEVOTHYROXINE SODIUM 25 MCG: 25 TABLET ORAL at 05:14

## 2021-11-02 RX ADMIN — METHOCARBAMOL TABLETS 750 MG: 750 TABLET, COATED ORAL at 19:35

## 2021-11-02 RX ADMIN — ACETAMINOPHEN 975 MG: 325 TABLET, FILM COATED ORAL at 13:35

## 2021-11-02 RX ADMIN — ACETAMINOPHEN 975 MG: 325 TABLET, FILM COATED ORAL at 05:14

## 2021-11-03 ENCOUNTER — TELEPHONE (OUTPATIENT)
Dept: NEUROSURGERY | Facility: CLINIC | Age: 76
End: 2021-11-03

## 2021-11-03 PROCEDURE — 99024 POSTOP FOLLOW-UP VISIT: CPT | Performed by: PHYSICIAN ASSISTANT

## 2021-11-03 RX ORDER — METHOCARBAMOL 500 MG/1
500 TABLET, FILM COATED ORAL EVERY 6 HOURS SCHEDULED
Status: DISCONTINUED | OUTPATIENT
Start: 2021-11-03 | End: 2021-11-05 | Stop reason: HOSPADM

## 2021-11-03 RX ORDER — LIDOCAINE 50 MG/G
2 PATCH TOPICAL DAILY
Status: DISCONTINUED | OUTPATIENT
Start: 2021-11-03 | End: 2021-11-05 | Stop reason: HOSPADM

## 2021-11-03 RX ADMIN — LIDOCAINE 5% 2 PATCH: 700 PATCH TOPICAL at 14:19

## 2021-11-03 RX ADMIN — ONDANSETRON 4 MG: 2 INJECTION INTRAMUSCULAR; INTRAVENOUS at 15:27

## 2021-11-03 RX ADMIN — METHOCARBAMOL TABLETS 750 MG: 750 TABLET, COATED ORAL at 05:51

## 2021-11-03 RX ADMIN — ACETAMINOPHEN 975 MG: 325 TABLET, FILM COATED ORAL at 14:19

## 2021-11-03 RX ADMIN — ACETAMINOPHEN 975 MG: 325 TABLET, FILM COATED ORAL at 05:50

## 2021-11-03 RX ADMIN — ATORVASTATIN CALCIUM 20 MG: 20 TABLET, FILM COATED ORAL at 21:08

## 2021-11-03 RX ADMIN — METHOCARBAMOL 500 MG: 500 TABLET ORAL at 23:37

## 2021-11-03 RX ADMIN — HEPARIN SODIUM 5000 UNITS: 5000 INJECTION INTRAVENOUS; SUBCUTANEOUS at 14:16

## 2021-11-03 RX ADMIN — HEPARIN SODIUM 5000 UNITS: 5000 INJECTION INTRAVENOUS; SUBCUTANEOUS at 05:51

## 2021-11-03 RX ADMIN — DOCUSATE SODIUM 100 MG: 100 CAPSULE, LIQUID FILLED ORAL at 17:02

## 2021-11-03 RX ADMIN — DOCUSATE SODIUM 100 MG: 100 CAPSULE, LIQUID FILLED ORAL at 07:34

## 2021-11-03 RX ADMIN — SENNOSIDES 8.6 MG: 8.6 TABLET ORAL at 07:34

## 2021-11-03 RX ADMIN — ACETAMINOPHEN 975 MG: 325 TABLET, FILM COATED ORAL at 21:08

## 2021-11-03 RX ADMIN — LEVOTHYROXINE SODIUM 25 MCG: 25 TABLET ORAL at 05:51

## 2021-11-03 RX ADMIN — METOPROLOL SUCCINATE 50 MG: 50 TABLET, EXTENDED RELEASE ORAL at 21:08

## 2021-11-03 RX ADMIN — METHOCARBAMOL 500 MG: 500 TABLET ORAL at 17:02

## 2021-11-03 RX ADMIN — HEPARIN SODIUM 5000 UNITS: 5000 INJECTION INTRAVENOUS; SUBCUTANEOUS at 21:09

## 2021-11-03 RX ADMIN — ALPRAZOLAM 0.5 MG: 0.5 TABLET ORAL at 23:37

## 2021-11-03 RX ADMIN — OXYCODONE HYDROCHLORIDE 10 MG: 5 TABLET ORAL at 03:14

## 2021-11-03 RX ADMIN — OXYCODONE HYDROCHLORIDE 10 MG: 5 TABLET ORAL at 17:02

## 2021-11-03 RX ADMIN — OXYCODONE HYDROCHLORIDE 10 MG: 5 TABLET ORAL at 11:35

## 2021-11-03 RX ADMIN — OXYCODONE HYDROCHLORIDE 10 MG: 5 TABLET ORAL at 21:08

## 2021-11-03 RX ADMIN — METHOCARBAMOL TABLETS 750 MG: 750 TABLET, COATED ORAL at 11:35

## 2021-11-03 RX ADMIN — OXYCODONE HYDROCHLORIDE 10 MG: 5 TABLET ORAL at 07:33

## 2021-11-04 ENCOUNTER — APPOINTMENT (INPATIENT)
Dept: RADIOLOGY | Facility: HOSPITAL | Age: 76
DRG: 460 | End: 2021-11-04
Payer: MEDICARE

## 2021-11-04 LAB
ANION GAP SERPL CALCULATED.3IONS-SCNC: 6 MMOL/L (ref 4–13)
BASOPHILS # BLD AUTO: 0.01 THOUSANDS/ΜL (ref 0–0.1)
BASOPHILS NFR BLD AUTO: 0 % (ref 0–1)
BUN SERPL-MCNC: 9 MG/DL (ref 5–25)
CALCIUM SERPL-MCNC: 9.8 MG/DL (ref 8.3–10.1)
CHLORIDE SERPL-SCNC: 98 MMOL/L (ref 100–108)
CO2 SERPL-SCNC: 29 MMOL/L (ref 21–32)
CREAT SERPL-MCNC: 0.68 MG/DL (ref 0.6–1.3)
EOSINOPHIL # BLD AUTO: 0.02 THOUSAND/ΜL (ref 0–0.61)
EOSINOPHIL NFR BLD AUTO: 0 % (ref 0–6)
ERYTHROCYTE [DISTWIDTH] IN BLOOD BY AUTOMATED COUNT: 12.3 % (ref 11.6–15.1)
GFR SERPL CREATININE-BSD FRML MDRD: 84 ML/MIN/1.73SQ M
GLUCOSE SERPL-MCNC: 135 MG/DL (ref 65–140)
HCT VFR BLD AUTO: 40.1 % (ref 34.8–46.1)
HGB BLD-MCNC: 12.9 G/DL (ref 11.5–15.4)
IMM GRANULOCYTES # BLD AUTO: 0.08 THOUSAND/UL (ref 0–0.2)
IMM GRANULOCYTES NFR BLD AUTO: 1 % (ref 0–2)
LYMPHOCYTES # BLD AUTO: 0.86 THOUSANDS/ΜL (ref 0.6–4.47)
LYMPHOCYTES NFR BLD AUTO: 7 % (ref 14–44)
MCH RBC QN AUTO: 28 PG (ref 26.8–34.3)
MCHC RBC AUTO-ENTMCNC: 32.2 G/DL (ref 31.4–37.4)
MCV RBC AUTO: 87 FL (ref 82–98)
MONOCYTES # BLD AUTO: 1.01 THOUSAND/ΜL (ref 0.17–1.22)
MONOCYTES NFR BLD AUTO: 8 % (ref 4–12)
NEUTROPHILS # BLD AUTO: 10.51 THOUSANDS/ΜL (ref 1.85–7.62)
NEUTS SEG NFR BLD AUTO: 84 % (ref 43–75)
NRBC BLD AUTO-RTO: 0 /100 WBCS
PLATELET # BLD AUTO: 232 THOUSANDS/UL (ref 149–390)
PMV BLD AUTO: 10.4 FL (ref 8.9–12.7)
POTASSIUM SERPL-SCNC: 3.9 MMOL/L (ref 3.5–5.3)
RBC # BLD AUTO: 4.6 MILLION/UL (ref 3.81–5.12)
SODIUM SERPL-SCNC: 133 MMOL/L (ref 136–145)
WBC # BLD AUTO: 12.49 THOUSAND/UL (ref 4.31–10.16)

## 2021-11-04 PROCEDURE — 80048 BASIC METABOLIC PNL TOTAL CA: CPT | Performed by: PHYSICIAN ASSISTANT

## 2021-11-04 PROCEDURE — 99024 POSTOP FOLLOW-UP VISIT: CPT | Performed by: NEUROLOGICAL SURGERY

## 2021-11-04 PROCEDURE — 85025 COMPLETE CBC W/AUTO DIFF WBC: CPT | Performed by: PHYSICIAN ASSISTANT

## 2021-11-04 PROCEDURE — 74018 RADEX ABDOMEN 1 VIEW: CPT

## 2021-11-04 RX ORDER — HYDROMORPHONE HCL/PF 1 MG/ML
0.5 SYRINGE (ML) INJECTION EVERY 2 HOUR PRN
Status: COMPLETED | OUTPATIENT
Start: 2021-11-04 | End: 2021-11-04

## 2021-11-04 RX ORDER — POLYETHYLENE GLYCOL 3350 17 G/17G
17 POWDER, FOR SOLUTION ORAL DAILY PRN
Status: DISCONTINUED | OUTPATIENT
Start: 2021-11-04 | End: 2021-11-05 | Stop reason: HOSPADM

## 2021-11-04 RX ADMIN — LEVOTHYROXINE SODIUM 25 MCG: 25 TABLET ORAL at 05:33

## 2021-11-04 RX ADMIN — METHOCARBAMOL 500 MG: 500 TABLET ORAL at 17:26

## 2021-11-04 RX ADMIN — DOCUSATE SODIUM 100 MG: 100 CAPSULE, LIQUID FILLED ORAL at 08:38

## 2021-11-04 RX ADMIN — HEPARIN SODIUM 5000 UNITS: 5000 INJECTION INTRAVENOUS; SUBCUTANEOUS at 14:26

## 2021-11-04 RX ADMIN — OXYCODONE HYDROCHLORIDE 10 MG: 5 TABLET ORAL at 22:19

## 2021-11-04 RX ADMIN — OXYCODONE HYDROCHLORIDE 10 MG: 5 TABLET ORAL at 17:26

## 2021-11-04 RX ADMIN — ALPRAZOLAM 0.5 MG: 0.5 TABLET ORAL at 22:57

## 2021-11-04 RX ADMIN — ACETAMINOPHEN 975 MG: 325 TABLET, FILM COATED ORAL at 14:26

## 2021-11-04 RX ADMIN — HYDROMORPHONE HYDROCHLORIDE 0.5 MG: 1 INJECTION, SOLUTION INTRAMUSCULAR; INTRAVENOUS; SUBCUTANEOUS at 02:29

## 2021-11-04 RX ADMIN — OXYCODONE HYDROCHLORIDE 10 MG: 5 TABLET ORAL at 01:43

## 2021-11-04 RX ADMIN — METHOCARBAMOL 500 MG: 500 TABLET ORAL at 14:26

## 2021-11-04 RX ADMIN — METOPROLOL SUCCINATE 50 MG: 50 TABLET, EXTENDED RELEASE ORAL at 22:19

## 2021-11-04 RX ADMIN — LIDOCAINE 5% 2 PATCH: 700 PATCH TOPICAL at 08:38

## 2021-11-04 RX ADMIN — HYDROMORPHONE HYDROCHLORIDE 0.5 MG: 1 INJECTION, SOLUTION INTRAMUSCULAR; INTRAVENOUS; SUBCUTANEOUS at 05:20

## 2021-11-04 RX ADMIN — SODIUM CHLORIDE 500 ML: 0.9 INJECTION, SOLUTION INTRAVENOUS at 14:44

## 2021-11-04 RX ADMIN — ACETAMINOPHEN 975 MG: 325 TABLET, FILM COATED ORAL at 22:19

## 2021-11-04 RX ADMIN — ACETAMINOPHEN 975 MG: 325 TABLET, FILM COATED ORAL at 05:33

## 2021-11-04 RX ADMIN — ATORVASTATIN CALCIUM 20 MG: 20 TABLET, FILM COATED ORAL at 22:19

## 2021-11-04 RX ADMIN — SENNOSIDES 8.6 MG: 8.6 TABLET ORAL at 08:38

## 2021-11-04 RX ADMIN — METHOCARBAMOL 500 MG: 500 TABLET ORAL at 05:33

## 2021-11-04 RX ADMIN — HEPARIN SODIUM 5000 UNITS: 5000 INJECTION INTRAVENOUS; SUBCUTANEOUS at 05:34

## 2021-11-04 RX ADMIN — HEPARIN SODIUM 5000 UNITS: 5000 INJECTION INTRAVENOUS; SUBCUTANEOUS at 22:20

## 2021-11-04 RX ADMIN — DOCUSATE SODIUM 100 MG: 100 CAPSULE, LIQUID FILLED ORAL at 17:26

## 2021-11-05 VITALS
DIASTOLIC BLOOD PRESSURE: 59 MMHG | HEART RATE: 81 BPM | OXYGEN SATURATION: 98 % | SYSTOLIC BLOOD PRESSURE: 118 MMHG | HEIGHT: 62 IN | TEMPERATURE: 98.4 F | WEIGHT: 166.01 LBS | BODY MASS INDEX: 30.55 KG/M2 | RESPIRATION RATE: 14 BRPM

## 2021-11-05 PROCEDURE — 99024 POSTOP FOLLOW-UP VISIT: CPT | Performed by: NEUROLOGICAL SURGERY

## 2021-11-05 RX ORDER — METHOCARBAMOL 500 MG/1
500 TABLET, FILM COATED ORAL EVERY 6 HOURS SCHEDULED
Qty: 30 TABLET | Refills: 0 | Status: SHIPPED | OUTPATIENT
Start: 2021-11-05 | End: 2021-11-11 | Stop reason: HOSPADM

## 2021-11-05 RX ORDER — OXYCODONE HYDROCHLORIDE 5 MG/1
5-10 TABLET ORAL EVERY 4 HOURS PRN
Qty: 30 TABLET | Refills: 0 | Status: SHIPPED | OUTPATIENT
Start: 2021-11-05 | End: 2021-11-11 | Stop reason: HOSPADM

## 2021-11-05 RX ORDER — ACETAMINOPHEN 325 MG/1
650 TABLET ORAL EVERY 6 HOURS SCHEDULED
Refills: 0
Start: 2021-11-05

## 2021-11-05 RX ADMIN — LIDOCAINE 5% 2 PATCH: 700 PATCH TOPICAL at 08:57

## 2021-11-05 RX ADMIN — DOCUSATE SODIUM 100 MG: 100 CAPSULE, LIQUID FILLED ORAL at 08:57

## 2021-11-05 RX ADMIN — ACETAMINOPHEN 975 MG: 325 TABLET, FILM COATED ORAL at 05:11

## 2021-11-05 RX ADMIN — LEVOTHYROXINE SODIUM 25 MCG: 25 TABLET ORAL at 05:11

## 2021-11-05 RX ADMIN — HEPARIN SODIUM 5000 UNITS: 5000 INJECTION INTRAVENOUS; SUBCUTANEOUS at 05:11

## 2021-11-05 RX ADMIN — METHOCARBAMOL 500 MG: 500 TABLET ORAL at 05:11

## 2021-11-05 RX ADMIN — SENNOSIDES 8.6 MG: 8.6 TABLET ORAL at 08:57

## 2021-11-05 RX ADMIN — OXYCODONE HYDROCHLORIDE 10 MG: 5 TABLET ORAL at 05:10

## 2021-11-08 ENCOUNTER — HOSPITAL ENCOUNTER (INPATIENT)
Facility: HOSPITAL | Age: 76
LOS: 2 days | Discharge: HOME | DRG: 392 | End: 2021-11-11
Attending: EMERGENCY MEDICINE | Admitting: INTERNAL MEDICINE
Payer: MEDICARE

## 2021-11-08 ENCOUNTER — TELEPHONE (OUTPATIENT)
Dept: NEUROSURGERY | Facility: CLINIC | Age: 76
End: 2021-11-08

## 2021-11-08 ENCOUNTER — APPOINTMENT (EMERGENCY)
Dept: CT IMAGING | Facility: HOSPITAL | Age: 76
DRG: 392 | End: 2021-11-08
Payer: MEDICARE

## 2021-11-08 DIAGNOSIS — K59.03 DRUG-INDUCED CONSTIPATION: ICD-10-CM

## 2021-11-08 DIAGNOSIS — M41.9 SCOLIOSIS DEFORMITY OF SPINE: ICD-10-CM

## 2021-11-08 DIAGNOSIS — M48.061 SPINAL STENOSIS AT L4-L5 LEVEL: ICD-10-CM

## 2021-11-08 DIAGNOSIS — R11.10 INTRACTABLE VOMITING: Primary | ICD-10-CM

## 2021-11-08 DIAGNOSIS — Z98.890 HISTORY OF BACK SURGERY: ICD-10-CM

## 2021-11-08 PROBLEM — R11.2 INTRACTABLE NAUSEA AND VOMITING: Status: ACTIVE | Noted: 2021-11-08

## 2021-11-08 PROBLEM — E87.6 HYPOKALEMIA: Status: ACTIVE | Noted: 2021-11-08

## 2021-11-08 LAB
ALBUMIN SERPL BCP-MCNC: 3.1 G/DL (ref 3.5–5)
ALP SERPL-CCNC: 129 U/L (ref 46–116)
ALT SERPL W P-5'-P-CCNC: 122 U/L (ref 12–78)
ANION GAP SERPL CALCULATED.3IONS-SCNC: 11 MMOL/L (ref 4–13)
AST SERPL W P-5'-P-CCNC: 94 U/L (ref 5–45)
ATRIAL RATE: 86 BPM
BACTERIA UR QL AUTO: ABNORMAL /HPF
BASOPHILS # BLD AUTO: 0.05 THOUSANDS/ΜL (ref 0–0.1)
BASOPHILS NFR BLD AUTO: 0 % (ref 0–1)
BILIRUB SERPL-MCNC: 0.48 MG/DL (ref 0.2–1)
BILIRUB UR QL STRIP: NEGATIVE
BUN SERPL-MCNC: 14 MG/DL (ref 5–25)
CALCIUM ALBUM COR SERPL-MCNC: 9.9 MG/DL (ref 8.3–10.1)
CALCIUM SERPL-MCNC: 9.2 MG/DL (ref 8.3–10.1)
CHLORIDE SERPL-SCNC: 100 MMOL/L (ref 100–108)
CLARITY UR: CLEAR
CO2 SERPL-SCNC: 28 MMOL/L (ref 21–32)
COLOR UR: YELLOW
CREAT SERPL-MCNC: 0.71 MG/DL (ref 0.6–1.3)
EOSINOPHIL # BLD AUTO: 0.03 THOUSAND/ΜL (ref 0–0.61)
EOSINOPHIL NFR BLD AUTO: 0 % (ref 0–6)
ERYTHROCYTE [DISTWIDTH] IN BLOOD BY AUTOMATED COUNT: 12.5 % (ref 11.6–15.1)
GFR SERPL CREATININE-BSD FRML MDRD: 82 ML/MIN/1.73SQ M
GLUCOSE SERPL-MCNC: 128 MG/DL (ref 65–140)
GLUCOSE UR STRIP-MCNC: NEGATIVE MG/DL
HCT VFR BLD AUTO: 43.9 % (ref 34.8–46.1)
HGB BLD-MCNC: 14.1 G/DL (ref 11.5–15.4)
HGB UR QL STRIP.AUTO: ABNORMAL
IMM GRANULOCYTES # BLD AUTO: 0.21 THOUSAND/UL (ref 0–0.2)
IMM GRANULOCYTES NFR BLD AUTO: 2 % (ref 0–2)
KETONES UR STRIP-MCNC: ABNORMAL MG/DL
LEUKOCYTE ESTERASE UR QL STRIP: NEGATIVE
LIPASE SERPL-CCNC: 136 U/L (ref 73–393)
LYMPHOCYTES # BLD AUTO: 0.91 THOUSANDS/ΜL (ref 0.6–4.47)
LYMPHOCYTES NFR BLD AUTO: 7 % (ref 14–44)
MCH RBC QN AUTO: 27.6 PG (ref 26.8–34.3)
MCHC RBC AUTO-ENTMCNC: 32.1 G/DL (ref 31.4–37.4)
MCV RBC AUTO: 86 FL (ref 82–98)
MONOCYTES # BLD AUTO: 0.9 THOUSAND/ΜL (ref 0.17–1.22)
MONOCYTES NFR BLD AUTO: 6 % (ref 4–12)
MUCOUS THREADS UR QL AUTO: ABNORMAL
NEUTROPHILS # BLD AUTO: 11.93 THOUSANDS/ΜL (ref 1.85–7.62)
NEUTS SEG NFR BLD AUTO: 85 % (ref 43–75)
NITRITE UR QL STRIP: NEGATIVE
NON-SQ EPI CELLS URNS QL MICRO: ABNORMAL /HPF
NRBC BLD AUTO-RTO: 0 /100 WBCS
P AXIS: 53 DEGREES
PH UR STRIP.AUTO: 6.5 [PH]
PLATELET # BLD AUTO: 336 THOUSANDS/UL (ref 149–390)
PMV BLD AUTO: 10.5 FL (ref 8.9–12.7)
POTASSIUM SERPL-SCNC: 3.3 MMOL/L (ref 3.5–5.3)
PR INTERVAL: 138 MS
PROT SERPL-MCNC: 7.1 G/DL (ref 6.4–8.2)
PROT UR STRIP-MCNC: NEGATIVE MG/DL
QRS AXIS: 5 DEGREES
QRSD INTERVAL: 68 MS
QT INTERVAL: 340 MS
QTC INTERVAL: 406 MS
RBC # BLD AUTO: 5.1 MILLION/UL (ref 3.81–5.12)
RBC #/AREA URNS AUTO: ABNORMAL /HPF
SODIUM SERPL-SCNC: 139 MMOL/L (ref 136–145)
SP GR UR STRIP.AUTO: 1.01 (ref 1–1.03)
T WAVE AXIS: 24 DEGREES
UROBILINOGEN UR QL STRIP.AUTO: 1 E.U./DL
VENTRICULAR RATE: 86 BPM
WBC # BLD AUTO: 14.03 THOUSAND/UL (ref 4.31–10.16)
WBC #/AREA URNS AUTO: ABNORMAL /HPF

## 2021-11-08 PROCEDURE — 93005 ELECTROCARDIOGRAM TRACING: CPT

## 2021-11-08 PROCEDURE — 81001 URINALYSIS AUTO W/SCOPE: CPT | Performed by: EMERGENCY MEDICINE

## 2021-11-08 PROCEDURE — 36415 COLL VENOUS BLD VENIPUNCTURE: CPT | Performed by: EMERGENCY MEDICINE

## 2021-11-08 PROCEDURE — 96374 THER/PROPH/DIAG INJ IV PUSH: CPT

## 2021-11-08 PROCEDURE — 85025 COMPLETE CBC W/AUTO DIFF WBC: CPT | Performed by: EMERGENCY MEDICINE

## 2021-11-08 PROCEDURE — 1123F ACP DISCUSS/DSCN MKR DOCD: CPT | Performed by: INTERNAL MEDICINE

## 2021-11-08 PROCEDURE — 80053 COMPREHEN METABOLIC PANEL: CPT | Performed by: EMERGENCY MEDICINE

## 2021-11-08 PROCEDURE — 83690 ASSAY OF LIPASE: CPT | Performed by: EMERGENCY MEDICINE

## 2021-11-08 PROCEDURE — 96361 HYDRATE IV INFUSION ADD-ON: CPT

## 2021-11-08 PROCEDURE — 74177 CT ABD & PELVIS W/CONTRAST: CPT

## 2021-11-08 PROCEDURE — 99220 PR INITIAL OBSERVATION CARE/DAY 70 MINUTES: CPT | Performed by: HOSPITALIST

## 2021-11-08 PROCEDURE — G1004 CDSM NDSC: HCPCS

## 2021-11-08 PROCEDURE — 99285 EMERGENCY DEPT VISIT HI MDM: CPT

## 2021-11-08 PROCEDURE — 93010 ELECTROCARDIOGRAM REPORT: CPT | Performed by: INTERNAL MEDICINE

## 2021-11-08 PROCEDURE — 99285 EMERGENCY DEPT VISIT HI MDM: CPT | Performed by: EMERGENCY MEDICINE

## 2021-11-08 PROCEDURE — 96376 TX/PRO/DX INJ SAME DRUG ADON: CPT

## 2021-11-08 RX ORDER — ONDANSETRON 2 MG/ML
4 INJECTION INTRAMUSCULAR; INTRAVENOUS EVERY 4 HOURS PRN
Status: DISCONTINUED | OUTPATIENT
Start: 2021-11-08 | End: 2021-11-11 | Stop reason: HOSPADM

## 2021-11-08 RX ORDER — ACETAMINOPHEN 325 MG/1
650 TABLET ORAL EVERY 6 HOURS PRN
Status: DISCONTINUED | OUTPATIENT
Start: 2021-11-08 | End: 2021-11-09

## 2021-11-08 RX ORDER — LEVOTHYROXINE SODIUM 0.03 MG/1
25 TABLET ORAL
Status: DISCONTINUED | OUTPATIENT
Start: 2021-11-09 | End: 2021-11-11 | Stop reason: HOSPADM

## 2021-11-08 RX ORDER — TRAMADOL HYDROCHLORIDE 50 MG/1
50 TABLET ORAL EVERY 6 HOURS PRN
Status: DISCONTINUED | OUTPATIENT
Start: 2021-11-08 | End: 2021-11-11 | Stop reason: HOSPADM

## 2021-11-08 RX ORDER — ONDANSETRON 2 MG/ML
4 INJECTION INTRAMUSCULAR; INTRAVENOUS ONCE
Status: COMPLETED | OUTPATIENT
Start: 2021-11-08 | End: 2021-11-08

## 2021-11-08 RX ORDER — POTASSIUM CHLORIDE 14.9 MG/ML
20 INJECTION INTRAVENOUS
Status: COMPLETED | OUTPATIENT
Start: 2021-11-08 | End: 2021-11-08

## 2021-11-08 RX ORDER — METOPROLOL SUCCINATE 50 MG/1
50 TABLET, EXTENDED RELEASE ORAL
Status: DISCONTINUED | OUTPATIENT
Start: 2021-11-08 | End: 2021-11-11 | Stop reason: HOSPADM

## 2021-11-08 RX ORDER — ALPRAZOLAM 0.25 MG/1
0.5 TABLET ORAL 2 TIMES DAILY PRN
Status: DISCONTINUED | OUTPATIENT
Start: 2021-11-08 | End: 2021-11-09

## 2021-11-08 RX ORDER — SODIUM CHLORIDE 9 MG/ML
75 INJECTION, SOLUTION INTRAVENOUS CONTINUOUS
Status: DISCONTINUED | OUTPATIENT
Start: 2021-11-08 | End: 2021-11-11

## 2021-11-08 RX ADMIN — POTASSIUM CHLORIDE 20 MEQ: 14.9 INJECTION, SOLUTION INTRAVENOUS at 19:37

## 2021-11-08 RX ADMIN — IOHEXOL 100 ML: 350 INJECTION, SOLUTION INTRAVENOUS at 15:04

## 2021-11-08 RX ADMIN — ACETAMINOPHEN 650 MG: 325 TABLET, FILM COATED ORAL at 21:45

## 2021-11-08 RX ADMIN — ONDANSETRON 4 MG: 2 INJECTION INTRAMUSCULAR; INTRAVENOUS at 14:01

## 2021-11-08 RX ADMIN — SODIUM CHLORIDE 1000 ML: 0.9 INJECTION, SOLUTION INTRAVENOUS at 14:04

## 2021-11-08 RX ADMIN — METOPROLOL SUCCINATE 50 MG: 50 TABLET, EXTENDED RELEASE ORAL at 21:43

## 2021-11-08 RX ADMIN — ONDANSETRON 4 MG: 2 INJECTION INTRAMUSCULAR; INTRAVENOUS at 15:35

## 2021-11-08 RX ADMIN — POTASSIUM CHLORIDE 20 MEQ: 14.9 INJECTION, SOLUTION INTRAVENOUS at 17:50

## 2021-11-08 RX ADMIN — SODIUM CHLORIDE 100 ML/HR: 0.9 INJECTION, SOLUTION INTRAVENOUS at 19:37

## 2021-11-08 RX ADMIN — ONDANSETRON 4 MG: 2 INJECTION INTRAMUSCULAR; INTRAVENOUS at 19:39

## 2021-11-08 RX ADMIN — SODIUM CHLORIDE 1000 ML: 0.9 INJECTION, SOLUTION INTRAVENOUS at 16:23

## 2021-11-09 PROBLEM — K59.03 DRUG-INDUCED CONSTIPATION: Status: ACTIVE | Noted: 2021-11-09

## 2021-11-09 LAB
ANION GAP SERPL CALCULATED.3IONS-SCNC: 10 MMOL/L (ref 4–13)
BASOPHILS # BLD AUTO: 0.04 THOUSANDS/ΜL (ref 0–0.1)
BASOPHILS NFR BLD AUTO: 0 % (ref 0–1)
BUN SERPL-MCNC: 8 MG/DL (ref 5–25)
CALCIUM SERPL-MCNC: 8.6 MG/DL (ref 8.3–10.1)
CHLORIDE SERPL-SCNC: 103 MMOL/L (ref 100–108)
CO2 SERPL-SCNC: 26 MMOL/L (ref 21–32)
CREAT SERPL-MCNC: 0.57 MG/DL (ref 0.6–1.3)
DME PARACHUTE DELIVERY DATE ACTUAL: NORMAL
DME PARACHUTE DELIVERY DATE REQUESTED: NORMAL
DME PARACHUTE ITEM DESCRIPTION: NORMAL
DME PARACHUTE ORDER STATUS: NORMAL
DME PARACHUTE SUPPLIER NAME: NORMAL
DME PARACHUTE SUPPLIER PHONE: NORMAL
EOSINOPHIL # BLD AUTO: 0.13 THOUSAND/ΜL (ref 0–0.61)
EOSINOPHIL NFR BLD AUTO: 1 % (ref 0–6)
ERYTHROCYTE [DISTWIDTH] IN BLOOD BY AUTOMATED COUNT: 12.6 % (ref 11.6–15.1)
GFR SERPL CREATININE-BSD FRML MDRD: 89 ML/MIN/1.73SQ M
GLUCOSE SERPL-MCNC: 88 MG/DL (ref 65–140)
HCT VFR BLD AUTO: 37.5 % (ref 34.8–46.1)
HGB BLD-MCNC: 12 G/DL (ref 11.5–15.4)
IMM GRANULOCYTES # BLD AUTO: 0.11 THOUSAND/UL (ref 0–0.2)
IMM GRANULOCYTES NFR BLD AUTO: 1 % (ref 0–2)
LYMPHOCYTES # BLD AUTO: 1.57 THOUSANDS/ΜL (ref 0.6–4.47)
LYMPHOCYTES NFR BLD AUTO: 11 % (ref 14–44)
MAGNESIUM SERPL-MCNC: 2.1 MG/DL (ref 1.6–2.6)
MCH RBC QN AUTO: 27.6 PG (ref 26.8–34.3)
MCHC RBC AUTO-ENTMCNC: 32 G/DL (ref 31.4–37.4)
MCV RBC AUTO: 86 FL (ref 82–98)
MONOCYTES # BLD AUTO: 1.23 THOUSAND/ΜL (ref 0.17–1.22)
MONOCYTES NFR BLD AUTO: 9 % (ref 4–12)
NEUTROPHILS # BLD AUTO: 11.09 THOUSANDS/ΜL (ref 1.85–7.62)
NEUTS SEG NFR BLD AUTO: 78 % (ref 43–75)
NRBC BLD AUTO-RTO: 0 /100 WBCS
PLATELET # BLD AUTO: 302 THOUSANDS/UL (ref 149–390)
PMV BLD AUTO: 10.1 FL (ref 8.9–12.7)
POTASSIUM SERPL-SCNC: 3.6 MMOL/L (ref 3.5–5.3)
RBC # BLD AUTO: 4.35 MILLION/UL (ref 3.81–5.12)
SODIUM SERPL-SCNC: 139 MMOL/L (ref 136–145)
WBC # BLD AUTO: 14.17 THOUSAND/UL (ref 4.31–10.16)

## 2021-11-09 PROCEDURE — 85025 COMPLETE CBC W/AUTO DIFF WBC: CPT | Performed by: HOSPITALIST

## 2021-11-09 PROCEDURE — 83735 ASSAY OF MAGNESIUM: CPT | Performed by: HOSPITALIST

## 2021-11-09 PROCEDURE — 80048 BASIC METABOLIC PNL TOTAL CA: CPT | Performed by: HOSPITALIST

## 2021-11-09 PROCEDURE — 97163 PT EVAL HIGH COMPLEX 45 MIN: CPT

## 2021-11-09 PROCEDURE — 97116 GAIT TRAINING THERAPY: CPT

## 2021-11-09 PROCEDURE — 36415 COLL VENOUS BLD VENIPUNCTURE: CPT | Performed by: HOSPITALIST

## 2021-11-09 PROCEDURE — 99232 SBSQ HOSP IP/OBS MODERATE 35: CPT | Performed by: INTERNAL MEDICINE

## 2021-11-09 RX ORDER — POLYETHYLENE GLYCOL 3350 17 G/17G
17 POWDER, FOR SOLUTION ORAL DAILY
Status: DISCONTINUED | OUTPATIENT
Start: 2021-11-09 | End: 2021-11-11 | Stop reason: HOSPADM

## 2021-11-09 RX ORDER — ALPRAZOLAM 0.5 MG/1
0.5 TABLET ORAL 2 TIMES DAILY PRN
Status: DISCONTINUED | OUTPATIENT
Start: 2021-11-09 | End: 2021-11-11 | Stop reason: HOSPADM

## 2021-11-09 RX ORDER — AMOXICILLIN 250 MG
1 CAPSULE ORAL 2 TIMES DAILY
Status: DISCONTINUED | OUTPATIENT
Start: 2021-11-09 | End: 2021-11-11 | Stop reason: HOSPADM

## 2021-11-09 RX ORDER — ACETAMINOPHEN 325 MG/1
650 TABLET ORAL EVERY 8 HOURS SCHEDULED
Status: DISCONTINUED | OUTPATIENT
Start: 2021-11-09 | End: 2021-11-11 | Stop reason: HOSPADM

## 2021-11-09 RX ADMIN — ALPRAZOLAM 0.5 MG: 0.25 TABLET ORAL at 02:27

## 2021-11-09 RX ADMIN — ALPRAZOLAM 0.5 MG: 0.5 TABLET ORAL at 22:10

## 2021-11-09 RX ADMIN — SODIUM CHLORIDE 100 ML/HR: 0.9 INJECTION, SOLUTION INTRAVENOUS at 16:12

## 2021-11-09 RX ADMIN — METOPROLOL SUCCINATE 50 MG: 50 TABLET, EXTENDED RELEASE ORAL at 21:29

## 2021-11-09 RX ADMIN — ACETAMINOPHEN 650 MG: 325 TABLET, FILM COATED ORAL at 22:10

## 2021-11-09 RX ADMIN — DOCUSATE SODIUM AND SENNOSIDES 1 TABLET: 8.6; 5 TABLET, FILM COATED ORAL at 10:40

## 2021-11-09 RX ADMIN — ONDANSETRON 4 MG: 2 INJECTION INTRAMUSCULAR; INTRAVENOUS at 22:10

## 2021-11-09 RX ADMIN — ENOXAPARIN SODIUM 40 MG: 40 INJECTION SUBCUTANEOUS at 09:21

## 2021-11-09 RX ADMIN — LEVOTHYROXINE SODIUM 25 MCG: 25 TABLET ORAL at 05:15

## 2021-11-09 RX ADMIN — DOCUSATE SODIUM AND SENNOSIDES 1 TABLET: 8.6; 5 TABLET, FILM COATED ORAL at 17:32

## 2021-11-09 RX ADMIN — ONDANSETRON 4 MG: 2 INJECTION INTRAMUSCULAR; INTRAVENOUS at 16:12

## 2021-11-09 RX ADMIN — ONDANSETRON 4 MG: 2 INJECTION INTRAMUSCULAR; INTRAVENOUS at 11:27

## 2021-11-09 RX ADMIN — POLYETHYLENE GLYCOL 3350 17 G: 17 POWDER, FOR SOLUTION ORAL at 10:40

## 2021-11-10 LAB
ANION GAP SERPL CALCULATED.3IONS-SCNC: 6 MMOL/L (ref 4–13)
BASOPHILS # BLD AUTO: 0.06 THOUSANDS/ΜL (ref 0–0.1)
BASOPHILS NFR BLD AUTO: 1 % (ref 0–1)
BUN SERPL-MCNC: 5 MG/DL (ref 5–25)
CALCIUM SERPL-MCNC: 8.7 MG/DL (ref 8.3–10.1)
CHLORIDE SERPL-SCNC: 104 MMOL/L (ref 100–108)
CO2 SERPL-SCNC: 28 MMOL/L (ref 21–32)
CREAT SERPL-MCNC: 0.71 MG/DL (ref 0.6–1.3)
EOSINOPHIL # BLD AUTO: 0.23 THOUSAND/ΜL (ref 0–0.61)
EOSINOPHIL NFR BLD AUTO: 2 % (ref 0–6)
ERYTHROCYTE [DISTWIDTH] IN BLOOD BY AUTOMATED COUNT: 12.8 % (ref 11.6–15.1)
GFR SERPL CREATININE-BSD FRML MDRD: 82 ML/MIN/1.73SQ M
GLUCOSE SERPL-MCNC: 101 MG/DL (ref 65–140)
HCT VFR BLD AUTO: 39.5 % (ref 34.8–46.1)
HGB BLD-MCNC: 12.7 G/DL (ref 11.5–15.4)
IMM GRANULOCYTES # BLD AUTO: 0.12 THOUSAND/UL (ref 0–0.2)
IMM GRANULOCYTES NFR BLD AUTO: 1 % (ref 0–2)
LYMPHOCYTES # BLD AUTO: 1.9 THOUSANDS/ΜL (ref 0.6–4.47)
LYMPHOCYTES NFR BLD AUTO: 17 % (ref 14–44)
MCH RBC QN AUTO: 27.7 PG (ref 26.8–34.3)
MCHC RBC AUTO-ENTMCNC: 32.2 G/DL (ref 31.4–37.4)
MCV RBC AUTO: 86 FL (ref 82–98)
MONOCYTES # BLD AUTO: 0.81 THOUSAND/ΜL (ref 0.17–1.22)
MONOCYTES NFR BLD AUTO: 7 % (ref 4–12)
NEUTROPHILS # BLD AUTO: 7.82 THOUSANDS/ΜL (ref 1.85–7.62)
NEUTS SEG NFR BLD AUTO: 72 % (ref 43–75)
NRBC BLD AUTO-RTO: 0 /100 WBCS
PLATELET # BLD AUTO: 329 THOUSANDS/UL (ref 149–390)
PMV BLD AUTO: 9.7 FL (ref 8.9–12.7)
POTASSIUM SERPL-SCNC: 3.2 MMOL/L (ref 3.5–5.3)
RBC # BLD AUTO: 4.58 MILLION/UL (ref 3.81–5.12)
SODIUM SERPL-SCNC: 138 MMOL/L (ref 136–145)
WBC # BLD AUTO: 10.94 THOUSAND/UL (ref 4.31–10.16)

## 2021-11-10 PROCEDURE — 99232 SBSQ HOSP IP/OBS MODERATE 35: CPT | Performed by: INTERNAL MEDICINE

## 2021-11-10 PROCEDURE — 97116 GAIT TRAINING THERAPY: CPT

## 2021-11-10 PROCEDURE — 97110 THERAPEUTIC EXERCISES: CPT

## 2021-11-10 PROCEDURE — 97530 THERAPEUTIC ACTIVITIES: CPT

## 2021-11-10 PROCEDURE — 80048 BASIC METABOLIC PNL TOTAL CA: CPT | Performed by: INTERNAL MEDICINE

## 2021-11-10 PROCEDURE — 85025 COMPLETE CBC W/AUTO DIFF WBC: CPT | Performed by: INTERNAL MEDICINE

## 2021-11-10 RX ORDER — BISACODYL 10 MG
10 SUPPOSITORY, RECTAL RECTAL DAILY PRN
Status: DISCONTINUED | OUTPATIENT
Start: 2021-11-10 | End: 2021-11-11 | Stop reason: HOSPADM

## 2021-11-10 RX ORDER — POTASSIUM CHLORIDE 20 MEQ/1
40 TABLET, EXTENDED RELEASE ORAL ONCE
Status: COMPLETED | OUTPATIENT
Start: 2021-11-10 | End: 2021-11-10

## 2021-11-10 RX ADMIN — LEVOTHYROXINE SODIUM 25 MCG: 25 TABLET ORAL at 05:01

## 2021-11-10 RX ADMIN — ACETAMINOPHEN 650 MG: 325 TABLET, FILM COATED ORAL at 05:01

## 2021-11-10 RX ADMIN — POLYETHYLENE GLYCOL 3350 17 G: 17 POWDER, FOR SOLUTION ORAL at 09:35

## 2021-11-10 RX ADMIN — METOPROLOL SUCCINATE 50 MG: 50 TABLET, EXTENDED RELEASE ORAL at 22:39

## 2021-11-10 RX ADMIN — POTASSIUM CHLORIDE 40 MEQ: 1500 TABLET, EXTENDED RELEASE ORAL at 11:03

## 2021-11-10 RX ADMIN — BISACODYL 10 MG: 10 SUPPOSITORY RECTAL at 14:42

## 2021-11-10 RX ADMIN — DOCUSATE SODIUM AND SENNOSIDES 1 TABLET: 8.6; 5 TABLET, FILM COATED ORAL at 17:34

## 2021-11-10 RX ADMIN — ALPRAZOLAM 0.5 MG: 0.5 TABLET ORAL at 23:33

## 2021-11-10 RX ADMIN — ENOXAPARIN SODIUM 40 MG: 40 INJECTION SUBCUTANEOUS at 09:35

## 2021-11-10 RX ADMIN — ACETAMINOPHEN 650 MG: 325 TABLET, FILM COATED ORAL at 13:55

## 2021-11-10 RX ADMIN — ACETAMINOPHEN 650 MG: 325 TABLET, FILM COATED ORAL at 22:39

## 2021-11-10 RX ADMIN — DOCUSATE SODIUM AND SENNOSIDES 1 TABLET: 8.6; 5 TABLET, FILM COATED ORAL at 09:35

## 2021-11-10 RX ADMIN — SODIUM CHLORIDE 100 ML/HR: 0.9 INJECTION, SOLUTION INTRAVENOUS at 04:41

## 2021-11-11 VITALS
RESPIRATION RATE: 18 BRPM | WEIGHT: 162.7 LBS | HEART RATE: 91 BPM | BODY MASS INDEX: 29.76 KG/M2 | OXYGEN SATURATION: 93 % | DIASTOLIC BLOOD PRESSURE: 72 MMHG | TEMPERATURE: 99.7 F | SYSTOLIC BLOOD PRESSURE: 151 MMHG

## 2021-11-11 PROCEDURE — 99239 HOSP IP/OBS DSCHRG MGMT >30: CPT | Performed by: INTERNAL MEDICINE

## 2021-11-11 RX ORDER — AMOXICILLIN 250 MG
1 CAPSULE ORAL 2 TIMES DAILY
Qty: 60 TABLET | Refills: 0 | Status: SHIPPED | OUTPATIENT
Start: 2021-11-11 | End: 2022-03-01 | Stop reason: ALTCHOICE

## 2021-11-11 RX ORDER — POLYETHYLENE GLYCOL 3350 17 G/17G
17 POWDER, FOR SOLUTION ORAL DAILY PRN
Qty: 14 EACH | Refills: 0 | Status: SHIPPED | OUTPATIENT
Start: 2021-11-11 | End: 2021-11-25

## 2021-11-11 RX ADMIN — POLYETHYLENE GLYCOL 3350 17 G: 17 POWDER, FOR SOLUTION ORAL at 08:33

## 2021-11-11 RX ADMIN — DOCUSATE SODIUM AND SENNOSIDES 1 TABLET: 8.6; 5 TABLET, FILM COATED ORAL at 08:33

## 2021-11-11 RX ADMIN — ACETAMINOPHEN 650 MG: 325 TABLET, FILM COATED ORAL at 05:38

## 2021-11-11 RX ADMIN — ENOXAPARIN SODIUM 40 MG: 40 INJECTION SUBCUTANEOUS at 08:33

## 2021-11-11 RX ADMIN — LEVOTHYROXINE SODIUM 25 MCG: 25 TABLET ORAL at 05:38

## 2021-11-15 ENCOUNTER — CLINICAL SUPPORT (OUTPATIENT)
Dept: NEUROSURGERY | Facility: CLINIC | Age: 76
End: 2021-11-15

## 2021-11-15 VITALS — TEMPERATURE: 99.2 F | DIASTOLIC BLOOD PRESSURE: 80 MMHG | SYSTOLIC BLOOD PRESSURE: 138 MMHG

## 2021-11-15 DIAGNOSIS — Z98.890 POST-OPERATIVE STATE: Primary | ICD-10-CM

## 2021-11-15 PROCEDURE — 99024 POSTOP FOLLOW-UP VISIT: CPT

## 2021-11-30 ENCOUNTER — TELEPHONE (OUTPATIENT)
Dept: NEUROSURGERY | Facility: CLINIC | Age: 76
End: 2021-11-30

## 2021-12-09 ENCOUNTER — HOSPITAL ENCOUNTER (OUTPATIENT)
Dept: RADIOLOGY | Facility: HOSPITAL | Age: 76
Discharge: HOME | End: 2021-12-09
Payer: MEDICARE

## 2021-12-09 DIAGNOSIS — M48.061 SPINAL STENOSIS AT L4-L5 LEVEL: ICD-10-CM

## 2021-12-09 PROCEDURE — 72100 X-RAY EXAM L-S SPINE 2/3 VWS: CPT

## 2021-12-16 ENCOUNTER — DOCUMENTATION (OUTPATIENT)
Dept: NEUROSURGERY | Facility: CLINIC | Age: 76
End: 2021-12-16

## 2021-12-16 ENCOUNTER — OFFICE VISIT (OUTPATIENT)
Dept: NEUROSURGERY | Facility: CLINIC | Age: 76
End: 2021-12-16

## 2021-12-16 VITALS
WEIGHT: 158 LBS | TEMPERATURE: 98.4 F | BODY MASS INDEX: 29.08 KG/M2 | SYSTOLIC BLOOD PRESSURE: 137 MMHG | HEIGHT: 62 IN | RESPIRATION RATE: 16 BRPM | HEART RATE: 95 BPM | DIASTOLIC BLOOD PRESSURE: 83 MMHG

## 2021-12-16 DIAGNOSIS — Z48.89 ENCOUNTER FOR POSTOPERATIVE CARE RELATED TO SURGICAL JOINT FUSION: Primary | ICD-10-CM

## 2021-12-16 DIAGNOSIS — Z98.1 ENCOUNTER FOR POSTOPERATIVE CARE RELATED TO SURGICAL JOINT FUSION: Primary | ICD-10-CM

## 2021-12-16 PROCEDURE — 99024 POSTOP FOLLOW-UP VISIT: CPT | Performed by: NEUROLOGICAL SURGERY

## 2022-01-04 ENCOUNTER — HOSPITAL ENCOUNTER (OUTPATIENT)
Dept: ULTRASOUND IMAGING | Facility: HOSPITAL | Age: 77
Discharge: HOME | End: 2022-01-04
Attending: UROLOGY
Payer: MEDICARE

## 2022-01-04 DIAGNOSIS — N28.89 URETERAL FISTULA: ICD-10-CM

## 2022-01-04 PROCEDURE — 76770 US EXAM ABDO BACK WALL COMP: CPT

## 2022-01-05 ENCOUNTER — OFFICE VISIT (OUTPATIENT)
Dept: GASTROENTEROLOGY | Facility: CLINIC | Age: 77
End: 2022-01-05
Payer: MEDICARE

## 2022-01-05 VITALS
DIASTOLIC BLOOD PRESSURE: 80 MMHG | BODY MASS INDEX: 29.08 KG/M2 | HEART RATE: 102 BPM | HEIGHT: 62 IN | WEIGHT: 158 LBS | SYSTOLIC BLOOD PRESSURE: 148 MMHG

## 2022-01-05 DIAGNOSIS — Z86.010 HX OF COLONIC POLYP: ICD-10-CM

## 2022-01-05 DIAGNOSIS — R11.10 DRY HEAVES: Primary | ICD-10-CM

## 2022-01-05 DIAGNOSIS — K59.00 CONSTIPATION, UNSPECIFIED CONSTIPATION TYPE: ICD-10-CM

## 2022-01-05 PROCEDURE — 99214 OFFICE O/P EST MOD 30 MIN: CPT | Performed by: PHYSICIAN ASSISTANT

## 2022-01-05 NOTE — PROGRESS NOTES
[facility] - Outpatient Follow-up Note  Ashley Jang 64 y o  female MRN: [id number]  Encounter: [id number]          ASSESSMENT AND PLAN:      1  Dry heaves  Patient with dry heaves intermittently  Patient does have history of GERD and dysphagia-we will plan for EGD for evaluation   - EGD; Future    2  Hx of colonic polyp  Patient with history of colon polyps with last colonoscopy in 2019 with inadequate prep  Colonoscopy is due at this time  - Colonoscopy; Future    3  Constipation, unspecified constipation type  Patient with history of constipation and was hospitalized and this was likely related to her narcotic pain medication after back surgery  Patient's symptoms are controlled currently with dietary consumption of prunes  - Colonoscopy; Future    ______________________________________________________________________    SUBJECTIVE:      This is a 68-year-old female who does have a history of colon polyps GERD and dysphagia  She states that she has been having intermittent episodes of dry heaves and she was concerned  She had back surgery last year and had significant constipation had intractable nausea and vomiting at that time and had a CT scan which had shown possible diverticulitis which she was treated for and she is actually due for a colonoscopy this year  The patient was wondering due to the dry heaves if she could also have the EGD at the same time as she was concerned  She states that she does not have any significant reflux symptoms or difficulty swallowing and last had an EGD performed in 2017 and at that time had been noted to have hiatal hernia with foreshortened esophagus lower esophageal B ring which was dilated and station with suspected possible esophageal dysmotility    She states that her swallowing is not too bad at this time as it was in the past   Colonoscopy was performed last in 2019 which had shown extensive left-sided diverticulosis and recurrent polyp in right colon was not seen  repeat colonoscopy is recommended in 3-5 years with a split prep  REVIEW OF SYSTEMS IS OTHERWISE NEGATIVE        Historical Information   Past Medical History:   Diagnosis Date    Disease of thyroid gland     GERD (gastroesophageal reflux disease)     History of transfusion     Tubal Pregnancy    Hyperlipidemia     Hypertension     Low back pain     Tubal pregnancy      Past Surgical History:   Procedure Laterality Date    CARPAL TUNNEL RELEASE Bilateral     COLONOSCOPY      ECTOPIC PREGNANCY SURGERY      EYE SURGERY      Defuction of Right Eye Sac    NEUROPLASTY / TRANSPOSITION MEDIAN NERVE AT CARPAL TUNNEL BILATERAL      OSTEOTOMY TOES Left     2 and 3rd phalanges    OTHER SURGICAL HISTORY      Calcification removed from Right Thumb    [place] ARTHDSIS POST/POSTEROLATRL/POSTINTERBODY LUMBAR N/A 2021    Procedure: Minimally invasive transverse lumbar interbody fusion L4-5 from left-sided approach with decompressive laminectomy;  Surgeon: Refugio Pearson MD;  [facility];  Service: Neurosurgery     Social History   Social History     Substance and Sexual Activity   Alcohol Use Not Currently     Social History     Substance and Sexual Activity   Drug Use Not Currently     Social History     Tobacco Use   Smoking Status Former Smoker    Quit date: 0    Years since quittin 0   Smokeless Tobacco Never Used   Tobacco Comment    quit 40 years ago     Family History   Problem Relation Age of Onset    Leukemia Mother     Stomach cancer Father     Aneurysm Brother     COPD Brother     Anuerysm Brother     Hernia Brother     Lung cancer Brother        Meds/Allergies       Current Outpatient Medications:     acetaminophen (TYLENOL) 325 mg tablet    ALPRAZolam (XANAX) 0 5 mg tablet    atorvastatin (LIPITOR) 20 mg tablet    levothyroxine 25 mcg tablet    metoprolol succinate (TOPROL-XL) 50 mg 24 hr tablet    polyethylene glycol (MIRALAX) 17 g packet    senna-docusate sodium (SENOKOT S) 8 6-50 mg per tablet    No Known Allergies        Objective     Blood pressure 148/80, pulse 102, height 5' 2" (1 575 m), weight 71 7 kg (158 lb)  Body mass index is 28 9 kg/m²  PHYSICAL EXAM:      General Appearance:   Alert, cooperative, no distress   HEENT:   Normocephalic, atraumatic, anicteric      Neck:  Supple, symmetrical, trachea midline   Lungs:   Clear to auscultation bilaterally; no rales, rhonchi or wheezing; respirations unlabored    Heart[de-identified]   Regular rate and rhythm; no murmur, rub, or gallop  Abdomen:   Soft, non-tender, non-distended; normal bowel sounds; no masses, no organomegaly    Genitalia:   Deferred    Rectal:   Deferred    Extremities:  No cyanosis, clubbing or edema    Pulses:  2+ and symmetric    Skin:  No jaundice, rashes, or lesions    Lymph nodes:  No palpable cervical lymphadenopathy        Lab Results:   No visits with results within 1 Day(s) from this visit     Latest known visit with results is:   Admission on 11/08/2021, Discharged on 11/11/2021   Component Date Value    WBC 11/08/2021 14 03*    RBC 11/08/2021 5 10     Hemoglobin 11/08/2021 14 1     Hematocrit 11/08/2021 43 9     MCV 11/08/2021 86     MCH 11/08/2021 27 6     MCHC 11/08/2021 32 1     RDW 11/08/2021 12 5     MPV 11/08/2021 10 5     Platelets 91/10/8687 336     nRBC 11/08/2021 0     Neutrophils Relative 11/08/2021 85*    Immat GRANS % 11/08/2021 2     Lymphocytes Relative 11/08/2021 7*    Monocytes Relative 11/08/2021 6     Eosinophils Relative 11/08/2021 0     Basophils Relative 11/08/2021 0     Neutrophils Absolute 11/08/2021 11 93*    Immature Grans Absolute 11/08/2021 0 21*    Lymphocytes Absolute 11/08/2021 0 91     Monocytes Absolute 11/08/2021 0 90     Eosinophils Absolute 11/08/2021 0 03     Basophils Absolute 11/08/2021 0 05     Sodium 11/08/2021 139     Potassium 11/08/2021 3 3*    Chloride 11/08/2021 100     CO2 11/08/2021 28  ANION GAP 11/08/2021 11     BUN 11/08/2021 14     Creatinine 11/08/2021 0 71     Glucose 11/08/2021 128     Calcium 11/08/2021 9 2     Corrected Calcium 11/08/2021 9 9     AST 11/08/2021 94*    ALT 11/08/2021 122*    Alkaline Phosphatase 11/08/2021 129*    Total Protein 11/08/2021 7 1     Albumin 11/08/2021 3 1*    Total Bilirubin 11/08/2021 0 48     eGFR 11/08/2021 82     Lipase 11/08/2021 136     Ventricular Rate 11/08/2021 86     Atrial Rate 11/08/2021 86     TN Interval 11/08/2021 138     QRSD Interval 11/08/2021 68     QT Interval 11/08/2021 340     QTC Interval 11/08/2021 406     P Axis 11/08/2021 53     QRS Axis 11/08/2021 5     T Wave Axis 11/08/2021 24     Color, UA 11/08/2021 Yellow     Clarity, UA 11/08/2021 Clear     Specific [place], UA 11/08/2021 1 015     pH, UA 11/08/2021 6 5     Leukocytes, UA 11/08/2021 Negative     Nitrite, UA 11/08/2021 Negative     Protein, UA 11/08/2021 Negative     Glucose, UA 11/08/2021 Negative     Ketones, UA 11/08/2021 40 (2+)*    Urobilinogen, UA 11/08/2021 1 0     Bilirubin, UA 11/08/2021 Negative     Blood, UA 11/08/2021 Trace-Intact*    RBC, UA 11/08/2021 0-1     WBC, UA 11/08/2021 None Seen     Epithelial Cells 11/08/2021 Occasional     Bacteria, UA 11/08/2021 Occasional     MUCUS THREADS 11/08/2021 Occasional*    WBC 11/09/2021 14 17*    RBC 11/09/2021 4 35     Hemoglobin 11/09/2021 12 0     Hematocrit 11/09/2021 37 5     MCV 11/09/2021 86     MCH 11/09/2021 27 6     MCHC 11/09/2021 32 0     RDW 11/09/2021 12 6     MPV 11/09/2021 10 1     Platelets 74/83/7465 302     nRBC 11/09/2021 0     Neutrophils Relative 11/09/2021 78*    Immat GRANS % 11/09/2021 1     Lymphocytes Relative 11/09/2021 11*    Monocytes Relative 11/09/2021 9     Eosinophils Relative 11/09/2021 1     Basophils Relative 11/09/2021 0     Neutrophils Absolute 11/09/2021 11 09*    Immature Grans Absolute 11/09/2021 0 11     Lymphocytes Absolute 11/09/2021 1 57     Monocytes Absolute 11/09/2021 1 23*    Eosinophils Absolute 11/09/2021 0 13     Basophils Absolute 11/09/2021 0 04     Sodium 11/09/2021 139     Potassium 11/09/2021 3 6     Chloride 11/09/2021 103     CO2 11/09/2021 26     ANION GAP 11/09/2021 10     BUN 11/09/2021 8     Creatinine 11/09/2021 0 57*    Glucose 11/09/2021 88     Calcium 11/09/2021 8 6     eGFR 11/09/2021 89     Magnesium 11/09/2021 2 1     Sodium 11/10/2021 138     Potassium 11/10/2021 3 2*    Chloride 11/10/2021 104     CO2 11/10/2021 28     ANION GAP 11/10/2021 6     BUN 11/10/2021 5     Creatinine 11/10/2021 0 71     Glucose 11/10/2021 101     Calcium 11/10/2021 8 7     eGFR 11/10/2021 82     WBC 11/10/2021 10 94*    RBC 11/10/2021 4 58     Hemoglobin 11/10/2021 12 7     Hematocrit 11/10/2021 39 5     MCV 11/10/2021 86     MCH 11/10/2021 27 7     MCHC 11/10/2021 32 2     RDW 11/10/2021 12 8     MPV 11/10/2021 9 7     Platelets 92/52/3341 329     nRBC 11/10/2021 0     Neutrophils Relative 11/10/2021 72     Immat GRANS % 11/10/2021 1     Lymphocytes Relative 11/10/2021 17     Monocytes Relative 11/10/2021 7     Eosinophils Relative 11/10/2021 2     Basophils Relative 11/10/2021 1     Neutrophils Absolute 11/10/2021 7 82*    Immature Grans Absolute 11/10/2021 0 12     Lymphocytes Absolute 11/10/2021 1 90     Monocytes Absolute 11/10/2021 0 81     Eosinophils Absolute 11/10/2021 0 23     Basophils Absolute 11/10/2021 0 06          Radiology Results:   XR spine lumbar 2 or 3 views injury    Result Date: 12/16/2021  Narrative: LUMBAR SPINE INDICATION:   M48 061: Spinal stenosis, lumbar region without neurogenic claudication  COMPARISON:  11/2/2021 VIEWS:  XR SPINE LUMBAR 2 OR 3 VIEWS INJURY Images: 3 FINDINGS: Fusion hardware with intervertebral disc cage at L4-5  Pedicle screws and vertical stabilizing rods noted  No evidence for hardware complication   There are 5 non rib bearing lumbar vertebral bodies  There is no evidence of acute fracture or destructive osseous lesion  Moderate levoconvex scoliosis  Multilevel degenerative disc disease in the lower thoracic and upper lumbar spine  Marked degenerative change at the L5-S1 disc  The visualized pedicles appear intact  SI joints appear normal  There are atherosclerotic calcifications  Soft tissues are otherwise unremarkable  Impression: Stable postoperative changes in the lower lumbar spine  No evidence for hardware complication  No acute osseous abnormalities  Workstation performed: [id number]     US kidney and bladder    Result Date: 1/5/2022  Narrative: RENAL ULTRASOUND INDICATION:   N28 89: Other specified disorders of kidney and ureter  COMPARISON: CT 11/8/2021 TECHNIQUE:   Ultrasound of the retroperitoneum was performed with a curvilinear transducer utilizing volumetric sweeps and still imaging techniques  FINDINGS: KIDNEYS: Symmetric and normal size  Right kidney:  13 x 4 9 x 5 1 cm  Left kidney:  11 2 x 4 7 x 4 2 cm  Right kidney Normal echogenicity and contour  No suspicious masses detected  Simple appearing lower pole cyst measures 3 5 x 4 4 x 4 3 cm  No hydronephrosis  No shadowing calculi  No perinephric fluid collections  Left kidney Normal echogenicity and contour  No suspicious masses detected  Small cortical cyst measuring 1 1 x 0 7 x 1 3 cm  No hydronephrosis  No shadowing calculi  No perinephric fluid collections  URETERS: Nonvisualized  BLADDER: Normally distended  No focal thickening or mass lesions  Bilateral ureteral jets detected  Impression: 1  Bilateral renal cysts  No hydronephrosis   Workstation performed: [id number]

## 2022-01-05 NOTE — PATIENT INSTRUCTIONS
Scheduled date of EGD/colonoscopy (as of today): 2/28/22  Physician performing EGD/colonoscopy: Dr Josselyn Jaime  Location of EGD/colonoscopy: [facility]  Desired bowel prep reviewed with patient: MIralax/Dulcolax  Instructions reviewed with patient by: Raiza  Clearances:  N/A

## 2022-02-28 ENCOUNTER — HOSPITAL ENCOUNTER (OUTPATIENT)
Dept: GASTROENTEROLOGY | Facility: AMBULATORY SURGERY CENTER | Age: 77
Discharge: HOME | End: 2022-02-28
Payer: MEDICARE

## 2022-02-28 ENCOUNTER — ANESTHESIA EVENT (OUTPATIENT)
Dept: GASTROENTEROLOGY | Facility: AMBULATORY SURGERY CENTER | Age: 77
End: 2022-02-28

## 2022-02-28 ENCOUNTER — ANESTHESIA (OUTPATIENT)
Dept: GASTROENTEROLOGY | Facility: AMBULATORY SURGERY CENTER | Age: 77
End: 2022-02-28

## 2022-02-28 VITALS
DIASTOLIC BLOOD PRESSURE: 70 MMHG | TEMPERATURE: 97.8 F | OXYGEN SATURATION: 95 % | RESPIRATION RATE: 18 BRPM | WEIGHT: 160 LBS | HEIGHT: 62 IN | BODY MASS INDEX: 29.44 KG/M2 | HEART RATE: 90 BPM | SYSTOLIC BLOOD PRESSURE: 123 MMHG

## 2022-02-28 DIAGNOSIS — Z86.010 HX OF COLONIC POLYP: ICD-10-CM

## 2022-02-28 DIAGNOSIS — R11.10 DRY HEAVES: ICD-10-CM

## 2022-02-28 DIAGNOSIS — K59.00 CONSTIPATION, UNSPECIFIED CONSTIPATION TYPE: ICD-10-CM

## 2022-02-28 PROCEDURE — 99100 ANES PT EXTEME AGE<1 YR&>70: CPT | Performed by: NURSE ANESTHETIST, CERTIFIED REGISTERED

## 2022-02-28 PROCEDURE — 45330 DIAGNOSTIC SIGMOIDOSCOPY: CPT | Performed by: INTERNAL MEDICINE

## 2022-02-28 PROCEDURE — 00811 ANES LWR INTST NDSC NOS: CPT | Performed by: NURSE ANESTHETIST, CERTIFIED REGISTERED

## 2022-02-28 PROCEDURE — 43249 ESOPH EGD DILATION <30 MM: CPT | Performed by: INTERNAL MEDICINE

## 2022-02-28 PROCEDURE — 43239 EGD BIOPSY SINGLE/MULTIPLE: CPT | Performed by: INTERNAL MEDICINE

## 2022-02-28 PROCEDURE — 88305 TISSUE EXAM BY PATHOLOGIST: CPT | Performed by: SPECIALIST

## 2022-02-28 RX ORDER — LIDOCAINE HYDROCHLORIDE 10 MG/ML
INJECTION, SOLUTION EPIDURAL; INFILTRATION; INTRACAUDAL; PERINEURAL AS NEEDED
Status: DISCONTINUED | OUTPATIENT
Start: 2022-02-28 | End: 2022-02-28

## 2022-02-28 RX ORDER — TRIAMCINOLONE ACETONIDE 1 MG/G
CREAM TOPICAL
COMMUNITY
Start: 2021-12-30

## 2022-02-28 RX ORDER — SODIUM CHLORIDE 9 MG/ML
30 INJECTION, SOLUTION INTRAVENOUS CONTINUOUS
Status: DISCONTINUED | OUTPATIENT
Start: 2022-02-28 | End: 2022-03-04 | Stop reason: HOSPADM

## 2022-02-28 RX ORDER — PROPOFOL 10 MG/ML
INJECTION, EMULSION INTRAVENOUS AS NEEDED
Status: DISCONTINUED | OUTPATIENT
Start: 2022-02-28 | End: 2022-02-28

## 2022-02-28 RX ADMIN — PROPOFOL 50 MG: 10 INJECTION, EMULSION INTRAVENOUS at 10:48

## 2022-02-28 RX ADMIN — PROPOFOL 200 MG: 10 INJECTION, EMULSION INTRAVENOUS at 10:45

## 2022-02-28 RX ADMIN — LIDOCAINE HYDROCHLORIDE 70 MG: 10 INJECTION, SOLUTION EPIDURAL; INFILTRATION; INTRACAUDAL; PERINEURAL at 10:45

## 2022-02-28 RX ADMIN — PROPOFOL 70 MG: 10 INJECTION, EMULSION INTRAVENOUS at 10:50

## 2022-02-28 RX ADMIN — PROPOFOL 80 MG: 10 INJECTION, EMULSION INTRAVENOUS at 10:53

## 2022-02-28 RX ADMIN — SODIUM CHLORIDE: 9 INJECTION, SOLUTION INTRAVENOUS at 10:41

## 2022-02-28 NOTE — ANESTHESIA PREPROCEDURE EVALUATION
Procedure:  COLONOSCOPY  EGD    Relevant Problems   ANESTHESIA (within normal limits)      CARDIO   (+) Hypertension      MUSCULOSKELETAL   (+) Lower back pain   (+) Neurogenic claudication due to lumbar spinal stenosis   (+) Scoliosis deformity of spine      NEURO/PSYCH   (+) Chronic right shoulder pain   (+) Neurogenic claudication due to lumbar spinal stenosis        Physical Exam    Airway    Mallampati score: I  TM Distance: >3 FB  Neck ROM: full     Dental   No notable dental hx     Cardiovascular  Rhythm: regular, Rate: normal, Cardiovascular exam normal    Pulmonary  Pulmonary exam normal Breath sounds clear to auscultation,     Other Findings        Anesthesia Plan  ASA Score- 2     Anesthesia Type- IV sedation with anesthesia with ASA Monitors  Additional Monitors:   Airway Plan:     Comment: Nasal cannula  Plan Factors-Exercise tolerance (METS): >4 METS  Chart reviewed  EKG reviewed  Imaging results reviewed  Existing labs reviewed  Patient summary reviewed  Patient is not a current smoker  Patient not instructed to abstain from smoking on day of procedure  Patient did not smoke on day of surgery  Obstructive sleep apnea risk education given perioperatively  Induction- intravenous  Postoperative Plan-     Informed Consent- Anesthetic plan and risks discussed with patient  I personally reviewed this patient with the CRNA  Discussed and agreed on the Anesthesia Plan with the GEORGI Vazquez

## 2022-02-28 NOTE — INTERVAL H&P NOTE
H&P reviewed  After examining the patient I find no changes in the patients condition since the H&P had been written      Vitals:    02/28/22 1030   BP: 141/80   Pulse: 94   Resp: 18   Temp: 97 8 °F (36 6 °C)   SpO2: 97%

## 2022-02-28 NOTE — H&P
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[facility])     See endocrinologist for care-patient unsure of condition    Chronic kidney disease     cyst on right kidney    Colon polyp     Disease of thyroid gland     Dry heaves     hx of    Dysphagia     GERD (gastroesophageal reflux disease)     Hearing loss     wears B/L hearing aides    History of transfusion 1978    Tubal Pregnancy    Hyperlipidemia     Hypertension     Low back pain     Osteoporosis scoliosis    Skipped heart beats     checked by cardiologist (Dr Raymond Rendon told is no problem October 2021; also had ECHO and stress test    Tubal pregnancy      Past Surgical History:   Procedure Laterality Date    CARPAL TUNNEL RELEASE Bilateral     COLONOSCOPY      ECTOPIC PREGNANCY SURGERY      EYE SURGERY      Defuction of Right Eye Sac    NEUROPLASTY / TRANSPOSITION MEDIAN NERVE AT CARPAL TUNNEL BILATERAL      OSTEOTOMY TOES Left     2 and 3rd phalanges    OTHER SURGICAL HISTORY      Calcification removed from Right Thumb    [place] ARTHDSIS POST/POSTEROLATRL/POSTINTERBODY LUMBAR N/A 11/1/2021    Procedure: Minimally invasive transverse lumbar interbody fusion L4-5 from left-sided approach with decompressive laminectomy;  Surgeon: Emelia Green MD;  Location[facility];  Service: Neurosurgery     Social History   Social History     Substance and Sexual Activity   Alcohol Use Not Currently     Social History     Substance and Sexual Activity   Drug Use Not Currently     Social History     Tobacco Use   Smoking Status Former Smoker    Quit date: 0    Years since quittin 1   Smokeless Tobacco Never Used   Tobacco Comment    quit 40 years ago     Family History   Problem Relation Age of Onset    Leukemia Mother     Stomach cancer Father     Aneurysm Brother     Lung cancer Brother     Kidney cancer Brother     COPD Brother     Aneurysm Brother     Anuerysm Brother     Hernia Brother     Lung cancer Brother        Meds/Allergies       Current Outpatient Medications:     acetaminophen (TYLENOL) 325 mg tablet    ALPRAZolam (XANAX) 0 5 mg tablet    atorvastatin (LIPITOR) 20 mg tablet    levothyroxine 25 mcg tablet    metoprolol succinate (TOPROL-XL) 50 mg 24 hr tablet    triamcinolone (KENALOG) 0 1 % cream    polyethylene glycol (MIRALAX) 17 g packet    senna-docusate sodium (SENOKOT S) 8 6-50 mg per tablet    Current Facility-Administered Medications:     sodium chloride 0 9 % infusion, 30 mL/hr, Intravenous, Continuous, Continue from Pre-op at 22 1042    No Known Allergies    Objective     /80   Pulse 94   Temp 97 8 °F (36 6 °C) (Skin)   Resp 18   Ht 5' 2" (1 575 m)   Wt 72 6 kg (160 lb)   SpO2 97%   BMI 29 26 kg/m²       PHYSICAL EXAM    Gen: NAD  Head: NCAT  CV: RRR  CHEST: Clear  ABD: soft, NT/ND  EXT: no edema      ASSESSMENT/PLAN:  This is a 64y o  year old female here for EGD and colonoscopy, and she is stable and optimized for her procedure

## 2022-02-28 NOTE — DISCHARGE INSTRUCTIONS
Upper Endoscopy and Colonoscopy   WHAT YOU NEED TO KNOW:   An upper endoscopy is also called an upper gastrointestinal (GI) endoscopy, or an esophagogastroduodenoscopy (EGD)  It is a procedure to examine the inside of your esophagus, stomach, and duodenum (first part of the small intestine) with a scope  You may feel bloated, gassy, or have some abdominal discomfort after your procedure  Your throat may be sore for 24 to 36 hours  You may burp or pass gas from air that is still inside your body  A colonoscopy is a procedure to examine the inside of your colon (intestine) with a scope  Polyps or tissue growths may have been removed during your colonoscopy  It is normal to feel bloated and to have some abdominal discomfort  You should be passing gas  If you have hemorrhoids or you had polyps removed, you may have a small amount of bleeding  DISCHARGE INSTRUCTIONS:   Seek care immediately if:   · You have sudden, severe abdominal pain  · You have problems swallowing  · You have a large amount of black, sticky bowel movements or blood in your bowel movements  · You have sudden trouble breathing  · You feel weak, lightheaded, or faint or your heart beats faster than normal for you  Contact your healthcare provider if:   · You have a fever and chills  · You have nausea or are vomiting  · Your abdomen is bloated or feels full and hard  · You have abdominal pain  · You have a large amount of black, sticky bowel movements or blood in your bowel movements  · You have not had a bowel movement for 3 days after your procedure  · You have rash or hives  · You have questions or concerns about your procedure  Activity:   ·       Do not lift, strain, or run for 24 hours after your procedure  ·       Rest after your procedure  You have been given medicine to relax you  Do not drive or make important decisions until the day after your procedure   Return to your normal activity as directed  ·       Relieve gas and discomfort from bloating by lying on your right side with a heating pad on your abdomen  You may need to take short walks to help the gas move out  Eat small meals until bloating is relieved  Follow up with your healthcare provider as directed: Write down your questions so you remember to ask them during your visits  If you take a blood thinner, please review the specific instructions from your endoscopist about when you should resume it  These can be found in the Recommendation and Your Medication list sections of this After Visit Summary  High Fiber Diet   WHAT YOU NEED TO KNOW:   What is a high-fiber diet? A high-fiber diet includes foods that have a high amount of fiber  Fiber is the part of fruits, vegetables, and grains that is not broken down by your body  Fiber keeps your bowel movements regular  Fiber can also help lower your cholesterol level, control blood sugar in people with diabetes, and relieve constipation  Fiber can also help you control your weight because it helps you feel full faster  Most adults should eat 25 to 35 grams of fiber each day  Talk to your dietitian or healthcare provider about the amount of fiber you need  What foods are good sources of fiber? · Foods with at least 4 grams of fiber per serving:      ? ? to ½ cup of high-fiber cereal (check the nutrition label on the box)    ? ½ cup of blackberries or raspberries    ? 4 dried prunes    ? 1 cooked artichoke    ? ½ cup of cooked legumes, such as lentils, or red, kidney, and san beans    · Foods with 1 to 3 grams of fiber per serving:      ? 1 slice of whole-wheat, pumpernickel, or rye bread    ? ½ cup of cooked brown rice    ? 4 whole-wheat crackers    ? 1 cup of oatmeal    ? ½ cup of cereal with 1 to 3 grams of fiber per serving (check the nutrition label on the box)    ? 1 small piece of fruit, such as an apple, banana, pear, kiwi, or orange    ?  3 dates    ? ½ cup of canned apricots, fruit cocktail, peaches, or pears    ? ½ cup of raw or cooked vegetables, such as carrots, cauliflower, cabbage, spinach, squash, or corn    What are some ways that I can increase fiber in my diet? · Choose brown or wild rice instead of white rice  · Use whole wheat flour in recipes instead of white or all-purpose flour  · Add beans and peas to casseroles or soups  · Choose fresh fruit and vegetables with peels or skins on instead of juices  What other guidelines should I follow? · Add fiber to your diet slowly  You may have abdominal discomfort, bloating, and gas if you add fiber to your diet too quickly  · Drink plenty of liquids as you add fiber to your diet  You may have nausea or develop constipation if you do not drink enough water  Ask how much liquid to drink each day and which liquids are best for you  CARE AGREEMENT:   You have the right to help plan your care  Discuss treatment options with your healthcare provider to decide what care you want to receive  You always have the right to refuse treatment  The above information is an  only  It is not intended as medical advice for individual conditions or treatments  Talk to your doctor, nurse or pharmacist before following any medical regimen to see if it is safe and effective for you  © Copyright [employer] 2022 Information is for End User's use only and may not be sold, redistributed or otherwise used for commercial purposes  All illustrations and images included in CareNotes® are the copyrighted property of [employer]  or Doug Castro  Esophageal Dilation   WHAT YOU NEED TO KNOW:   Esophageal dilation is a procedure to widen a narrow part of your esophagus  Your healthcare provider will use a dilator (inflatable balloon or another tool that expands) to make the area wider  He may also do an endoscopy before or during your esophageal dilation   During an endoscopy, your healthcare provider will use a scope to see inside your esophagus  DISCHARGE INSTRUCTIONS:   Medicines:   · Medicines  may be given to decrease stomach acid that can irritate your esophagus  · Take your medicine as directed  Contact your healthcare provider if you think your medicine is not helping or if you have side effects  Tell him if you are allergic to any medicine  Keep a list of the medicines, vitamins, and herbs you take  Include the amounts, and when and why you take them  Bring the list or the pill bottles to follow-up visits  Carry your medicine list with you in case of an emergency  Follow up with your healthcare provider as directed:  Write down your questions so you remember to ask them during your visits  Nutrition:  You may eat foods you normally eat  Chew your food well  Eat soft foods if you still have problems swallowing  Soft foods include applesauce, bananas, cooked cereal, cottage cheese, eggs, pudding, and yogurt  Ask for more information on what types of food to eat  Contact your healthcare provider if:   · You have a fever  · You feel very full or bloated  · You have more problems swallowing food  · You have nausea or are vomiting  · You have questions or concerns about your condition or care  Seek care immediately or call 911 if:   · You vomit blood  · You are not able to swallow any food  · You have a fast heartbeat, chest pain, or sudden trouble breathing  · Your abdomen suddenly becomes tender and hard  © Copyright [employer] 2018 Information is for End User's use only and may not be sold, redistributed or otherwise used for commercial purposes  All illustrations and images included in CareNotes® are the copyrighted property of [employer]  or [facility] Rupal Katz   The above information is an  only  It is not intended as medical advice for individual conditions or treatments   Talk to your doctor, nurse or pharmacist before following any medical regimen to see if it is safe and effective for you

## 2022-02-28 NOTE — DISCHARGE SUMMARY
Discharge Summary - Gloria Robles 64 y o  female MRN: [id number]    Unit/Bed#:  Encounter: [id number]    Admission Date:  02/28/2022    Admitting Diagnosis: Hx of colonic polyp [Z86 010]  Constipation, unspecified constipation type [K59 00]  Dry heaves [R11 10]    HPI:  History of colon polyp and previous colonoscopy with poor prep  Procedures Performed: No orders of the defined types were placed in this encounter  Summary of Hospital Course:  Procedure could not be completed because of poor prep  Significant Findings, Care, Treatment and Services Provided:  Suboptimal prep    Complications:  None    Discharge Diagnosis:  Suboptimal prep    Medical Problems             Resolved Problems  Date Reviewed: 1/5/2022    None                Condition at Discharge: good         Discharge instructions/Information to patient and family:   See after visit summary for information provided to patient and family  Provisions for Follow-Up Care:  See after visit summary for information related to follow-up care and any pertinent home health orders        PCP: Yasmine Goldman MD    Disposition: Home

## 2022-03-01 ENCOUNTER — ANESTHESIA EVENT (OUTPATIENT)
Dept: GASTROENTEROLOGY | Facility: AMBULATORY SURGERY CENTER | Age: 77
End: 2022-03-01

## 2022-03-01 ENCOUNTER — ANESTHESIA (OUTPATIENT)
Dept: GASTROENTEROLOGY | Facility: AMBULATORY SURGERY CENTER | Age: 77
End: 2022-03-01
Payer: MEDICARE

## 2022-03-01 ENCOUNTER — HOSPITAL ENCOUNTER (OUTPATIENT)
Dept: GASTROENTEROLOGY | Facility: AMBULATORY SURGERY CENTER | Age: 77
Discharge: HOME | End: 2022-03-01
Payer: MEDICARE

## 2022-03-01 VITALS
SYSTOLIC BLOOD PRESSURE: 119 MMHG | OXYGEN SATURATION: 98 % | HEART RATE: 68 BPM | TEMPERATURE: 98.1 F | BODY MASS INDEX: 29.44 KG/M2 | WEIGHT: 160 LBS | RESPIRATION RATE: 18 BRPM | DIASTOLIC BLOOD PRESSURE: 77 MMHG | HEIGHT: 62 IN

## 2022-03-01 DIAGNOSIS — K59.00 CONSTIPATION, UNSPECIFIED CONSTIPATION TYPE: ICD-10-CM

## 2022-03-01 DIAGNOSIS — Z86.010 HISTORY OF COLON POLYPS: ICD-10-CM

## 2022-03-01 PROCEDURE — 99100 ANES PT EXTEME AGE<1 YR&>70: CPT | Performed by: NURSE ANESTHETIST, CERTIFIED REGISTERED

## 2022-03-01 PROCEDURE — 45380 COLONOSCOPY AND BIOPSY: CPT | Performed by: INTERNAL MEDICINE

## 2022-03-01 PROCEDURE — 00811 ANES LWR INTST NDSC NOS: CPT | Performed by: NURSE ANESTHETIST, CERTIFIED REGISTERED

## 2022-03-01 PROCEDURE — 88305 TISSUE EXAM BY PATHOLOGIST: CPT | Performed by: PATHOLOGY

## 2022-03-01 RX ORDER — SODIUM CHLORIDE 9 MG/ML
30 INJECTION, SOLUTION INTRAVENOUS CONTINUOUS
OUTPATIENT
Start: 2022-03-01

## 2022-03-01 RX ORDER — LIDOCAINE HYDROCHLORIDE 10 MG/ML
INJECTION, SOLUTION EPIDURAL; INFILTRATION; INTRACAUDAL; PERINEURAL AS NEEDED
Status: DISCONTINUED | OUTPATIENT
Start: 2022-03-01 | End: 2022-03-01

## 2022-03-01 RX ORDER — SODIUM CHLORIDE 9 MG/ML
30 INJECTION, SOLUTION INTRAVENOUS CONTINUOUS
Status: DISCONTINUED | OUTPATIENT
Start: 2022-03-01 | End: 2022-03-05 | Stop reason: HOSPADM

## 2022-03-01 RX ORDER — PROPOFOL 10 MG/ML
INJECTION, EMULSION INTRAVENOUS AS NEEDED
Status: DISCONTINUED | OUTPATIENT
Start: 2022-03-01 | End: 2022-03-01

## 2022-03-01 RX ADMIN — PROPOFOL 30 MG: 10 INJECTION, EMULSION INTRAVENOUS at 13:57

## 2022-03-01 RX ADMIN — LIDOCAINE HYDROCHLORIDE 50 MG: 10 INJECTION, SOLUTION EPIDURAL; INFILTRATION; INTRACAUDAL; PERINEURAL at 13:50

## 2022-03-01 RX ADMIN — PROPOFOL 70 MG: 10 INJECTION, EMULSION INTRAVENOUS at 14:01

## 2022-03-01 RX ADMIN — PROPOFOL 50 MG: 10 INJECTION, EMULSION INTRAVENOUS at 14:03

## 2022-03-01 RX ADMIN — PROPOFOL 100 MG: 10 INJECTION, EMULSION INTRAVENOUS at 13:50

## 2022-03-01 RX ADMIN — PROPOFOL 100 MG: 10 INJECTION, EMULSION INTRAVENOUS at 13:54

## 2022-03-01 NOTE — DISCHARGE SUMMARY
Discharge Summary - Doug Tran 64 y o  female MRN: [id number]    Unit/Bed#:  Encounter: [id number]    Admission Date:  03/01/2022    Admitting Diagnosis: History of colon polyps [Z86 010]  Constipation, unspecified constipation type [K59 00]    HPI:  Screening colonoscopy    Procedures Performed: No orders of the defined types were placed in this encounter  Summary of Hospital Course: Tolerated procedure well    Significant Findings, Care, Treatment and Services Provided:  Significant diverticulosis with tortuosity, fixity and narrowing of sigmoid colon making the procedure difficult  Complications:  None    Discharge Diagnosis:  See above    Medical Problems             Resolved Problems  Date Reviewed: 1/5/2022    None                Condition at Discharge: good         Discharge instructions/Information to patient and family:   See after visit summary for information provided to patient and family  Provisions for Follow-Up Care:  See after visit summary for information related to follow-up care and any pertinent home health orders        PCP: Delvin Gregorio MD    Disposition: Home

## 2022-03-01 NOTE — ANESTHESIA PREPROCEDURE EVALUATION
Procedure:  COLONOSCOPY    Relevant Problems   CARDIO   (+) Hypertension      MUSCULOSKELETAL   (+) Lower back pain   (+) Neurogenic claudication due to lumbar spinal stenosis   (+) Scoliosis deformity of spine      NEURO/PSYCH   (+) Chronic right shoulder pain   (+) Neurogenic claudication due to lumbar spinal stenosis        Physical Exam    Airway    Mallampati score: I  TM Distance: >3 FB  Neck ROM: full     Dental   No notable dental hx     Cardiovascular  Rhythm: regular, Rate: normal, Cardiovascular exam normal    Pulmonary  Pulmonary exam normal Breath sounds clear to auscultation,     Other Findings        Anesthesia Plan  ASA Score- 2     Anesthesia Type- IV sedation with anesthesia with ASA Monitors  Additional Monitors:   Airway Plan:     Comment: Nasal cannula  Plan Factors-Exercise tolerance (METS): >4 METS  Chart reviewed  EKG reviewed  Imaging results reviewed  Existing labs reviewed  Patient summary reviewed  Patient is not a current smoker  Patient not instructed to abstain from smoking on day of procedure  Patient did not smoke on day of surgery  Obstructive sleep apnea risk education given perioperatively  Induction- intravenous  Postoperative Plan-     Informed Consent- Anesthetic plan and risks discussed with patient  I personally reviewed this patient with the CRNA  Discussed and agreed on the Anesthesia Plan with the GEORGI Tyler

## 2022-03-01 NOTE — ANESTHESIA POSTPROCEDURE EVALUATION
Post-Op Assessment Note    CV Status:  Stable  Pain Score: 0    Pain management: adequate     Mental Status:  Sleepy   Hydration Status:  Stable   PONV Controlled:  None   Airway Patency:  Patent      Post Op Vitals Reviewed: Yes      Staff: CRNA         No complications documented  BP      Temp      Pulse    Resp      SpO2      Did not provide pacu care

## 2022-03-07 ENCOUNTER — HOSPITAL ENCOUNTER (OUTPATIENT)
Dept: RADIOLOGY | Facility: HOSPITAL | Age: 77
Discharge: HOME | End: 2022-03-07
Attending: NEUROLOGICAL SURGERY
Payer: MEDICARE

## 2022-03-07 DIAGNOSIS — Z48.89 ENCOUNTER FOR POSTOPERATIVE CARE RELATED TO SURGICAL JOINT FUSION: ICD-10-CM

## 2022-03-07 DIAGNOSIS — Z98.1 ENCOUNTER FOR POSTOPERATIVE CARE RELATED TO SURGICAL JOINT FUSION: ICD-10-CM

## 2022-03-07 PROCEDURE — 72114 X-RAY EXAM L-S SPINE BENDING: CPT

## 2022-03-08 ENCOUNTER — OFFICE VISIT (OUTPATIENT)
Dept: NEUROSURGERY | Facility: CLINIC | Age: 77
End: 2022-03-08
Payer: MEDICARE

## 2022-03-08 VITALS
TEMPERATURE: 98.5 F | HEART RATE: 90 BPM | DIASTOLIC BLOOD PRESSURE: 83 MMHG | BODY MASS INDEX: 29.63 KG/M2 | SYSTOLIC BLOOD PRESSURE: 134 MMHG | WEIGHT: 161 LBS | RESPIRATION RATE: 16 BRPM | HEIGHT: 62 IN

## 2022-03-08 DIAGNOSIS — Z48.89 ENCOUNTER FOR POSTOPERATIVE CARE RELATED TO SURGICAL JOINT FUSION: Primary | ICD-10-CM

## 2022-03-08 DIAGNOSIS — Z98.1 ENCOUNTER FOR POSTOPERATIVE CARE RELATED TO SURGICAL JOINT FUSION: Primary | ICD-10-CM

## 2022-03-08 PROCEDURE — 99213 OFFICE O/P EST LOW 20 MIN: CPT | Performed by: NEUROLOGICAL SURGERY

## 2022-03-08 RX ORDER — DIPHENOXYLATE HYDROCHLORIDE AND ATROPINE SULFATE 2.5; .025 MG/1; MG/1
1 TABLET ORAL DAILY
COMMUNITY

## 2022-03-08 NOTE — PROGRESS NOTES
DISCUSSION SUMMARY  This is a 76 y o  female who is status post a fusion at the L4-5 level  She is doing very well  I recommended that she continue the principles of physical therapy and will see her back in the office on a p r n  basis  Return if symptoms worsen or fail to improve  Diagnosis ICD-10-CM Associated Orders   1  Encounter for postoperative care related to surgical joint fusion  Z48 89     Z98 1           Chief Complaint   Patient presents with    Follow-up     3 Months F/u for Lower back pain, S/p (DKO) Minimally invasive transverse lumbar interbody fusion L4-5 from left-sided approach with decompressive laminectomy [11/1/2021]; PT and XR L-Spine 3/7/22       HPI 68-year-old who is status post a lumbar fusion at the L4-5 level  She is doing fairly well  She has some arthritis in her low back with arthralgias but these are dramatically improved  She has had no recent falls  She is doing physical therapy which she finds to be very helpful  In general she feels that her symptoms have improved tremendously  Review of Systems   Constitutional: Negative  Negative for activity change  HENT: Positive for hearing loss (b/l hearing loss and wears earing aids)  Eyes: Negative  Respiratory: Negative  Cardiovascular: Negative  Gastrointestinal: Negative  Negative for constipation  Endocrine: Negative  Genitourinary: Negative  Negative for frequency and urgency  Musculoskeletal: Positive for arthralgias (h/o arthritis in lower back )  Negative for back pain, gait problem and myalgias  Denies recent falls  H/o PT and continues exercises at home, helpful  No recent INJ   Skin: Negative  Allergic/Immunologic: Negative  Neurological: Negative  Negative for weakness and numbness  Hematological: Negative  Psychiatric/Behavioral: Negative  Negative for confusion and sleep disturbance           Vitals:    /83 (BP Location: Right arm, Patient Position: Sitting, Cuff Size: Standard)   Pulse 90   Temp 98 5 °F (36 9 °C) (Probe)   Resp 16   Ht 5' 2" (1 575 m)   Wt 73 kg (161 lb)   BMI 29 45 kg/m²       MEDICAL HISTORY  Past Medical History:   Diagnosis Date    Adrenal abnormality [facility])     See endocrinologist for care-patient unsure of condition    Chronic kidney disease     cyst on right kidney    Colon polyp     Disease of thyroid gland     Dry heaves     hx of    Dysphagia     GERD (gastroesophageal reflux disease)     Hearing loss     wears B/L hearing aides    History of transfusion     Tubal Pregnancy    Hyperlipidemia     Hypertension     Irregular heart beat     cardio testing done recent  Oct 2021 = good   Hx skipped beats per pt    Low back pain     Osteoporosis scoliosis    Skipped heart beats     checked by cardiologist (Dr Brenton Doan told is no problem 2021; also had ECHO and stress test    Tubal pregnancy      Past Surgical History:   Procedure Laterality Date    CARPAL TUNNEL RELEASE Bilateral     COLONOSCOPY      ECTOPIC PREGNANCY SURGERY      EYE SURGERY      Defuction of Right Eye Sac    NEUROPLASTY / TRANSPOSITION MEDIAN NERVE AT [facility] TOES Left     2 and 3rd phalanges    OTHER SURGICAL HISTORY      Calcification removed from Right Thumb    AK ARTHDSIS POST/POSTEROLATRL/POSTINTERBODY LUMBAR N/A 2021    Procedure: Minimally invasive transverse lumbar interbody fusion L4-5 from left-sided approach with decompressive laminectomy;  Surgeon: Meryle Pluck, MD;  Location: [facility];  Service: Neurosurgery     Social History     Tobacco Use    Smoking status: Former Smoker     Quit date:      Years since quittin 2    Smokeless tobacco: Never Used    Tobacco comment: quit 40 years ago   Vaping Use    Vaping Use: Never used   Substance Use Topics    Alcohol use: Not Currently    Drug use: Not Currently        Current Outpatient Medications:     atorvastatin (LIPITOR) 20 mg tablet, Take 20 mg by mouth daily at bedtime , Disp: , Rfl:     CALCIUM PO, Take by mouth, Disp: , Rfl:     levothyroxine 25 mcg tablet, Take 25 mcg by mouth daily in the early morning , Disp: , Rfl:     metoprolol succinate (TOPROL-XL) 50 mg 24 hr tablet, Take 50 mg by mouth daily at bedtime , Disp: , Rfl:     multivitamin (THERAGRAN) TABS, Take 1 tablet by mouth daily, Disp: , Rfl:     triamcinolone (KENALOG) 0 1 % cream, APPLY TO RASH AREA TWICE DAILY ONLY WHEN FLARED APPLY BARRIER OINTMENT ON TOP, Disp: , Rfl:     VITAMIN D PO, Take by mouth, Disp: , Rfl:     VITAMIN E PO, Take by mouth, Disp: , Rfl:     acetaminophen (TYLENOL) 325 mg tablet, Take 2 tablets (650 mg total) by mouth every 6 (six) hours (Patient not taking: Reported on 3/8/2022 ), Disp: , Rfl: 0    ALPRAZolam (XANAX) 0 5 mg tablet, Take 0 5 mg by mouth as needed For Sleep (Patient not taking: Reported on 3/8/2022 ), Disp: , Rfl:     polyethylene glycol (MIRALAX) 17 g packet, Take 17 g by mouth daily as needed (constipation) for up to 14 days (Patient not taking: Reported on 12/16/2021 ), Disp: 14 each, Rfl: 0   No Known Allergies     The following portions of the patient's history were updated by MA and reviewed by MD: allergies, current medications, past family history, past medical history, past social history, past surgical history and problem list          Physical Exam  Vitals and nursing note reviewed  Constitutional:       General: She is not in acute distress  Appearance: Normal appearance  She is not ill-appearing, toxic-appearing or diaphoretic  HENT:      Head: Normocephalic  Nose: Nose normal    Eyes:      Extraocular Movements: Extraocular movements intact  Pupils: Pupils are equal, round, and reactive to light  Musculoskeletal:         General: No swelling, tenderness, deformity or signs of injury  Normal range of motion  Cervical back: Normal range of motion and neck supple   No rigidity  Right lower leg: No edema  Left lower leg: No edema  Lymphadenopathy:      Cervical: No cervical adenopathy  Skin:     General: Skin is warm and dry  Coloration: Skin is not jaundiced  Findings: No erythema or rash  Neurological:      General: No focal deficit present  Mental Status: She is alert and oriented to person, place, and time  Cranial Nerves: No cranial nerve deficit  Sensory: No sensory deficit  Motor: No weakness  Coordination: Coordination normal       Gait: Gait normal       Deep Tendon Reflexes: Reflexes normal    Psychiatric:         Mood and Affect: Mood normal          Behavior: Behavior normal          Thought Content:  Thought content normal          Judgment: Judgment normal            RESULTS/DATA  I have personally reviewed pertinent films in PACS  X-rays of the LS spine demonstrate the instrumentation to be in ideal position without signs of loosening or breakage

## 2022-04-07 ENCOUNTER — OFFICE VISIT (OUTPATIENT)
Dept: GASTROENTEROLOGY | Facility: CLINIC | Age: 77
End: 2022-04-07
Payer: MEDICARE

## 2022-04-07 VITALS
SYSTOLIC BLOOD PRESSURE: 148 MMHG | BODY MASS INDEX: 29.44 KG/M2 | WEIGHT: 160 LBS | HEIGHT: 62 IN | HEART RATE: 67 BPM | DIASTOLIC BLOOD PRESSURE: 78 MMHG

## 2022-04-07 DIAGNOSIS — Z86.010 HISTORY OF COLON POLYPS: Primary | ICD-10-CM

## 2022-04-07 DIAGNOSIS — K59.00 CONSTIPATION, UNSPECIFIED CONSTIPATION TYPE: ICD-10-CM

## 2022-04-07 DIAGNOSIS — K57.31 DIVERTICULOSIS LARGE INTESTINE W/O PERFORATION OR ABSCESS W/BLEEDING: ICD-10-CM

## 2022-04-07 PROCEDURE — 99214 OFFICE O/P EST MOD 30 MIN: CPT | Performed by: INTERNAL MEDICINE

## 2022-04-07 NOTE — PROGRESS NOTES
[facility] - Outpatient Follow-up Note  Arnie Daigle 76 y o  female MRN: [id number]  Encounter: [id number]          ASSESSMENT AND PLAN:      1  History of colon polyps  Patient has presented for a follow-up after her colonoscopy  A diminutive benign colon polyp was removed  Otherwise colonoscopy showed significant diverticulosis it tortuosity and fixity the colon which caused significant difficulty in passage of the scope beyond the sigmoid colon  I have discussed this with her  There was no other significant lesion noted  Incidentally I see during her CT scan last year there was mention of small amount air in her bladder  There is no history of urinary tract infection or pneumaturia  Patient advised to continue high-fiber diet and prone on a regular basis  She is moving her bowels normally  I have advised to call me immediately if there is any significant abdominal pain, fever etc     2  Constipation, unspecified constipation type  Chronic constipation  No change in her usual symptoms  3  Diverticulosis large intestine w/o perforation or abscess w/bleeding  See above discussion  ______________________________________________________________________    SUBJECTIVE:  Completely symptom free  Denies any abdominal pain or discomfort  There is no nausea vomiting  Denies any weight loss or weight gain  I have advised patient to continue eating high-fiber diet and try to lose weight  She will see me again in one year  REVIEW OF SYSTEMS IS OTHERWISE NEGATIVE        Historical Information   Past Medical History:   Diagnosis Date    Adrenal abnormality [facility])     See endocrinologist for care-patient unsure of condition    Chronic kidney disease     cyst on right kidney    Colon polyp     Disease of thyroid gland     Dry heaves     hx of    Dysphagia     GERD (gastroesophageal reflux disease)     Hearing loss     wears B/L hearing aides    History of transfusion     Tubal Pregnancy    Hyperlipidemia     Hypertension     Irregular heart beat     cardio testing done recent  Oct 2021 = good   Hx skipped beats per pt    Low back pain     Osteoporosis scoliosis    Skipped heart beats     checked by cardiologist (Dr Dunaway Cuff told is no problem 2021; also had ECHO and stress test    Tubal pregnancy      Past Surgical History:   Procedure Laterality Date    CARPAL TUNNEL RELEASE Bilateral     COLONOSCOPY      ECTOPIC PREGNANCY SURGERY      EYE SURGERY      Defuction of Right Eye Sac    NEUROPLASTY / TRANSPOSITION MEDIAN NERVE AT CARPAL TUNNEL BILATERAL      OSTEOTOMY TOES Left     2 and 3rd phalanges    OTHER SURGICAL HISTORY      Calcification removed from Right Thumb    WA ARTHDSIS POST/POSTEROLATRL/POSTINTERBODY LUMBAR N/A 2021    Procedure: Minimally invasive transverse lumbar interbody fusion L4-5 from left-sided approach with decompressive laminectomy;  Surgeon: Darius Pisano MD;  [facility];  Service: Neurosurgery     Social History   Social History     Substance and Sexual Activity   Alcohol Use Not Currently     Social History     Substance and Sexual Activity   Drug Use Not Currently     Social History     Tobacco Use   Smoking Status Former Smoker    Quit date: 0    Years since quittin 2   Smokeless Tobacco Never Used   Tobacco Comment    quit 40 years ago     Family History   Problem Relation Age of Onset    Leukemia Mother     Stomach cancer Father     Aneurysm Brother     Lung cancer Brother     Kidney cancer Brother     COPD Brother     Aneurysm Brother     Anuerysm Brother     Hernia Brother     Lung cancer Brother        Meds/Allergies       Current Outpatient Medications:     acetaminophen (TYLENOL) 325 mg tablet    ALPRAZolam (XANAX) 0 5 mg tablet    atorvastatin (LIPITOR) 20 mg tablet    CALCIUM PO    levothyroxine 25 mcg tablet    metoprolol succinate (TOPROL-XL) 50 mg 24 hr tablet   multivitamin (THERAGRAN) TABS    triamcinolone (KENALOG) 0 1 % cream    VITAMIN D PO    VITAMIN E PO    polyethylene glycol (MIRALAX) 17 g packet    No Known Allergies        Objective     Blood pressure 148/78, pulse 67, height 5' 2" (1 575 m), weight 72 6 kg (160 lb)  Body mass index is 29 26 kg/m²  PHYSICAL EXAM:      General Appearance:   Alert, cooperative, no distress   HEENT:   Normocephalic, atraumatic, anicteric      Neck:  Supple, symmetrical, trachea midline   Lungs:   Clear to auscultation bilaterally; no rales, rhonchi or wheezing; respirations unlabored    Heart[de-identified]   Regular rate and rhythm; no murmur, rub, or gallop  Abdomen:   Soft, non-tender, non-distended; normal bowel sounds; no masses, no organomegaly    Genitalia:   Deferred    Rectal:   Deferred    Extremities:  No cyanosis, clubbing or edema    Pulses:  2+ and symmetric    Skin:  No jaundice, rashes, or lesions    Lymph nodes:  No palpable cervical lymphadenopathy        Lab Results:   No visits with results within 1 Day(s) from this visit  Latest known visit with results is:   Hospital Outpatient Visit on 03/01/2022   Component Date Value    Case Report 03/01/2022                      Value:Surgical Pathology Report                         Case: [id number]                                   Authorizing Provider:  Isaac Lindsey MD      Collected:           03/01/2022 1409              Ordering Location:     [address] [place] [address] Received:            03/01/2022 [address] [place] [address]                                                                  Pathologist:           Analisa Mcclelland MD                                                         Specimen:    Large Intestine, Transverse Colon, cold bx prox transverse polyp                           Final Diagnosis 03/01/2022                      Value: This result contains rich text formatting which cannot be displayed here      Additional Information 03/01/2022                      Value: This result contains rich text formatting which cannot be displayed here  [facility] Gross Description 03/01/2022                      Value: This result contains rich text formatting which cannot be displayed here  Radiology Results:   No results found

## 2022-05-27 NOTE — DISCHARGE INSTRUCTIONS
Colonoscopy   WHAT YOU NEED TO KNOW:   A colonoscopy is a procedure to examine the inside of your colon (intestine) with a scope  Polyps or tissue growths may have been removed during your colonoscopy  It is normal to feel bloated and to have some abdominal discomfort  You should be passing gas  If you have hemorrhoids or you had polyps removed, you may have a small amount of bleeding  DISCHARGE INSTRUCTIONS:   Seek care immediately if:    You have sudden, severe abdominal pain   You have problems swallowing   You have a large amount of black, sticky bowel movements or blood in your bowel movements   You have sudden trouble breathing   You feel weak, lightheaded, or faint or your heart beats faster than normal for you  Contact your healthcare provider if:    You have a fever and chills   You have nausea or are vomiting   Your abdomen is bloated or feels full and hard   You have abdominal pain   You have black, sticky bowel movements or blood in your bowel movements   You have not had a bowel movement for 3 days after your procedure   You have rash or hives   You have questions or concerns about your procedure  Activity:    Do not lift, strain, or run for 24 hours after your procedure   Rest after your procedure  You have been given medicine to relax you  Do not drive or make important decisions until the day after your procedure  Return to your normal activity as directed   Relieve gas and discomfort from bloating by lying on your right side with a heating pad on your abdomen  You may need to take short walks to help the gas move out  Eat small meals until bloating is relieved  Follow up with your healthcare provider as directed: Write down your questions so you remember to ask them during your visits  If you take a blood thinner, please review the specific instructions from your endoscopist about when you should resume it   These can be Pt here in clinic and will be seen by Dr. Santillan.  ----- Message from Dayana Duncan sent at 5/27/2022 11:45 AM CDT -----  Her sister Katia has an appt today at 2:00. She would like to see if she can get Bishop scheduled this afternoon also. Please call Althea Bravo [phone]. Thank you        found in the Recommendation and Your Medication list sections of this After Visit Summary  High Fiber Diet   AMBULATORY CARE:   A high-fiber diet  includes foods that have a high amount of fiber  Fiber is the part of fruits, vegetables, and grains that is not broken down by your body  Fiber keeps your bowel movements regular  Fiber can also help lower your cholesterol level, control blood sugar in people with diabetes, and relieve constipation  Fiber can also help you control your weight because it helps you feel full faster  Most adults should eat 25 to 35 grams of fiber each day  Talk to your dietitian or healthcare provider about the amount of fiber you need  Good sources of fiber:       · Foods with at least 4 grams of fiber per serving:      ? ? to ½ cup of high-fiber cereal (check the nutrition label on the box)    ? ½ cup of blackberries or raspberries    ? 4 dried prunes    ? 1 cooked artichoke    ? ½ cup of cooked legumes, such as lentils, or red, kidney, and san beans    · Foods with 1 to 3 grams of fiber per serving:      ? 1 slice of whole-wheat, pumpernickel, or rye bread    ? ½ cup of cooked brown rice    ? 4 whole-wheat crackers    ? 1 cup of oatmeal    ? ½ cup of cereal with 1 to 3 grams of fiber per serving (check the nutrition label on the box)    ? 1 small piece of fruit, such as an apple, banana, pear, kiwi, or orange    ? 3 dates    ? ½ cup of canned apricots, fruit cocktail, peaches, or pears    ? ½ cup of raw or cooked vegetables, such as carrots, cauliflower, cabbage, spinach, squash, or corn    Ways that you can increase fiber in your diet:   · Choose brown or wild rice instead of white rice  · Use whole wheat flour in recipes instead of white or all-purpose flour  · Add beans and peas to casseroles or soups  · Choose fresh fruit and vegetables with peels or skins on instead of juices  Other diet guidelines to follow:   · Add fiber to your diet slowly    You may have abdominal discomfort, bloating, and gas if you add fiber to your diet too quickly  · Drink plenty of liquids as you add fiber to your diet  You may have nausea or develop constipation if you do not drink enough water  Ask how much liquid to drink each day and which liquids are best for you  © Copyright [employer] 2022 Information is for End User's use only and may not be sold, redistributed or otherwise used for commercial purposes  All illustrations and images included in CareNotes® are the copyrighted property of [employer]  or Formerly named [facility] Rupal Katz   The above information is an  only  It is not intended as medical advice for individual conditions or treatments  Talk to your doctor, nurse or pharmacist before following any medical regimen to see if it is safe and effective for you  High Fiber Diet   AMBULATORY CARE:   A high-fiber diet  includes foods that have a high amount of fiber  Fiber is the part of fruits, vegetables, and grains that is not broken down by your body  Fiber keeps your bowel movements regular  Fiber can also help lower your cholesterol level, control blood sugar in people with diabetes, and relieve constipation  Fiber can also help you control your weight because it helps you feel full faster  Most adults should eat 25 to 35 grams of fiber each day  Talk to your dietitian or healthcare provider about the amount of fiber you need  Good sources of fiber:       · Foods with at least 4 grams of fiber per serving:      ? ? to ½ cup of high-fiber cereal (check the nutrition label on the box)    ? ½ cup of blackberries or raspberries    ? 4 dried prunes    ? 1 cooked artichoke    ? ½ cup of cooked legumes, such as lentils, or red, kidney, and san beans    · Foods with 1 to 3 grams of fiber per serving:      ? 1 slice of whole-wheat, pumpernickel, or rye bread    ? ½ cup of cooked brown rice    ? 4 whole-wheat crackers    ?  1 cup of oatmeal    ? ½ cup of cereal with 1 to 3 grams of fiber per serving (check the nutrition label on the box)    ? 1 small piece of fruit, such as an apple, banana, pear, kiwi, or orange    ? 3 dates    ? ½ cup of canned apricots, fruit cocktail, peaches, or pears    ? ½ cup of raw or cooked vegetables, such as carrots, cauliflower, cabbage, spinach, squash, or corn    Ways that you can increase fiber in your diet:   · Choose brown or wild rice instead of white rice  · Use whole wheat flour in recipes instead of white or all-purpose flour  · Add beans and peas to casseroles or soups  · Choose fresh fruit and vegetables with peels or skins on instead of juices  Other diet guidelines to follow:   · Add fiber to your diet slowly  You may have abdominal discomfort, bloating, and gas if you add fiber to your diet too quickly  · Drink plenty of liquids as you add fiber to your diet  You may have nausea or develop constipation if you do not drink enough water  Ask how much liquid to drink each day and which liquids are best for you  © Copyright [employer] 2022 Information is for End User's use only and may not be sold, redistributed or otherwise used for commercial purposes  All illustrations and images included in CareNotes® are the copyrighted property of A D A [employer]  or Doug Castro  The above information is an  only  It is not intended as medical advice for individual conditions or treatments  Talk to your doctor, nurse or pharmacist before following any medical regimen to see if it is safe and effective for you  Colorectal Polyps   WHAT YOU NEED TO KNOW:   What are colorectal polyps? Colorectal polyps are small growths of tissue in the lining of the colon and rectum  Most polyps are usually benign (not cancer)  Certain types of polyps, called adenomatous polyps, may turn into cancer  What increases my risk for colorectal polyps? The exact cause of colorectal polyps is unknown   The following may increase your risk:  · Older age    · Foods high in fat and low in fiber    · Family history of polyps    · Intestinal diseases, such as Crohn disease or ulcerative colitis    · Smoking cigarettes or drinking alcohol    · Lack of physical activity, such as exercise    · Obesity    What are the signs and symptoms of colorectal polyps? · Blood in your bowel movement or bleeding from the rectum    · Change in bowel movement habits, such as diarrhea or constipation    · Abdominal pain    What do I need to know about colorectal polyp screening and diagnosis? Screening means you are checked for polyps that may be cancer, even if you do not have signs or symptoms  Screening is recommended starting at age 48 and continuing to age 76 if you are at average risk  Your healthcare provider may suggest screening starting at age 39  Screening may start before you are 45 or continue after you are 75 if your risk is high  Your provider will tell you how often to get screened  Timing depends on the type of screening and if polyps are found  Timing also depends on your age and if you are at increased risk for cancer  Screening may be recommended every 1, 2, 5, or 10 years  Your healthcare provider will need to test polyps to find out if they are cancer  Any of the following may be used to find polyps:  · A digital rectal exam  means your provider will use a finger to check for polyps  · A barium enema  is an x-ray of the colon  A tube is put into your anus, and a liquid called barium is put through the tube  Barium is used so that healthcare providers can see your colon better on the x-ray film  · A virtual colonoscopy  is a CT scan that takes pictures of the inside of your colon and rectum  A small, flexible tube is put into your rectum and air or carbon dioxide (gas) is used to expand your colon  This lets healthcare providers clearly see your colon and any polyps on a monitor      · Colonoscopy or sigmoidoscopy  are procedures to help your provider see the inside of your colon  He or she will use a flexible tube with a small light and camera on the end  During a sigmoidoscopy, your provider will only look at rectum and lower colon  During a colonoscopy, he or she will look at the full length of your colon  A small amount of tissue may be removed from your colon for tests  How are colorectal polyps treated? Small, benign polyps may not need treatment  Your healthcare provider will check the polyp over time to make sure it is not changing  Polyps that are cancer may be removed with one of the following:  · A polypectomy  is a minimally invasive procedure to remove a polyp during a colonoscopy or sigmoidoscopy  Your healthcare provider may need to remove the polyp with a laparoscope  Laparoscopy is done by inserting a small, flexible scope into incisions made on your abdomen  · Surgery  may be needed to remove large or deep polyps that cannot be removed in a polypectomy  Tissues or lymph nodes near a polyp may also be removed  This helps stop cancer from spreading  What can I do to lower my risk for colorectal polyps? · Eat a variety of healthy foods  Healthy foods include fruit, vegetables, whole-grain breads, low-fat dairy products, beans, lean meat, and fish  Ask if you need to be on a special diet  · Maintain a healthy weight  Ask your healthcare provider what a healthy weight is for you  Ask him or her to help with a weight loss plan if you are overweight  · Exercise as directed  Begin to exercise slowly and do more as you get stronger  Talk with your healthcare provider before you start an exercise program          · Limit alcohol  Your risk for polyps increases the more you drink  · Do not smoke  Nicotine and other chemicals in cigarettes and cigars increase your risk for polyps  Ask your healthcare provider for information if you currently smoke and need help to quit   E-cigarettes or smokeless tobacco still contain nicotine  Talk to your healthcare provider before you use these products  Where can I find more information? · [address] ([facility])  [address] , [place] [address]  Phone: [phone]  Web Address: Vic Juarez  [facility] gov    Call your local emergency number (911 in the [address]) if:   · You have sudden shortness of breath  · You have a fast heart rate, fast breathing, or are too dizzy to stand up  When should I seek immediate care? · You have severe abdominal pain  · You see blood in your bowel movement  When should I call my doctor? · You have a fever  · You have chills, a cough, or feel weak and achy  · You have abdominal pain that does not go away or gets worse after you take medicine  · Your abdomen is swollen  · You are losing weight without trying  · You have questions or concerns about your condition or care  CARE AGREEMENT:   You have the right to help plan your care  Learn about your health condition and how it may be treated  Discuss treatment options with your healthcare providers to decide what care you want to receive  You always have the right to refuse treatment  The above information is an  only  It is not intended as medical advice for individual conditions or treatments  Talk to your doctor, nurse or pharmacist before following any medical regimen to see if it is safe and effective for you  © Copyright [employer] 2022 Information is for End User's use only and may not be sold, redistributed or otherwise used for commercial purposes   All illustrations and images included in CareNotes® are the copyrighted property of A D A [employer]  or [facility]

## 2022-12-06 ENCOUNTER — OFFICE VISIT (OUTPATIENT)
Dept: GASTROENTEROLOGY | Facility: CLINIC | Age: 77
End: 2022-12-06

## 2022-12-06 VITALS
HEART RATE: 99 BPM | HEIGHT: 62 IN | BODY MASS INDEX: 31.1 KG/M2 | SYSTOLIC BLOOD PRESSURE: 167 MMHG | DIASTOLIC BLOOD PRESSURE: 96 MMHG | WEIGHT: 169 LBS

## 2022-12-06 DIAGNOSIS — K57.31 DIVERTICULOSIS LARGE INTESTINE W/O PERFORATION OR ABSCESS W/BLEEDING: ICD-10-CM

## 2022-12-06 DIAGNOSIS — Z86.010 HX OF COLONIC POLYP: ICD-10-CM

## 2022-12-06 DIAGNOSIS — R10.30 LOWER ABDOMINAL PAIN: ICD-10-CM

## 2022-12-06 DIAGNOSIS — K59.00 CONSTIPATION, UNSPECIFIED CONSTIPATION TYPE: ICD-10-CM

## 2022-12-06 DIAGNOSIS — R19.4 CHANGE IN BOWEL HABIT: ICD-10-CM

## 2022-12-06 RX ORDER — METFORMIN HYDROCHLORIDE 500 MG/1
TABLET, EXTENDED RELEASE ORAL
COMMUNITY
Start: 2022-10-18

## 2022-12-06 NOTE — PROGRESS NOTES
[facility] - Outpatient Follow-up Note  Myesha Barbour 76 y o  female MRN: [id number]  Encounter: [id number]          ASSESSMENT AND PLAN:      1  Lower abdominal pain  2  Change in bowel habit  Pt reports change in bowel habit and lower abdominal cramping pain associated with bowel movements over the last 3 weeks  At first, bowel movements were liquid but now are spaghetti like in caliber 2-3 times a day  Denies any fevers/chills, nausea/vomiting, blood per rectum, nocturnal symptoms, recent sick contracts or antibiotic use  Symptoms may be secondary to gastroenteritis, diverticular disease, constipation  Will obtain CT scan and then make further recommendations  Asked pt to continue to hold prunes for now until CT scan obtained  If negative for diverticulitis can resume use  -     CT abdomen pelvis w contrast; Future; Expected date: 12/06/2022  -     Basic metabolic panel; Future    3  Constipation, unspecified constipation type  4  Diverticulosis large intestine w/o perforation or abscess w/bleeding  Pt with history of extensive diverticulosis of the left colon causing moderate luminal narrowing in the descending and sigmoid colon seen on last colonoscopy in March  She typically eats 3-4 prunes a day and has regular bowel movements  5  Hx of colonic polyp  No further screening colonoscopy needed due to age    Follow up with Dr Kandy Dunn in 4 weeks  ______________________________________________________________________    SUBJECTIVE:  Myesha Barbour is a 76 y o  female with history of CKD, HLD, HTN, hypothyroidism, osteoporosis, GERD, lower esophageal B-ring s/p dilation, diverticular disease, constipation, and colon polyps who presents for follow-up for evaluation of diarrhea x3 weeks  At first she had abdominal cramping & liquid stool for about a week, then this became spaghetti like and she is moving her bowels 2-3 times a day   Abdominal cramping has gradually improved and after BMs resolves  Denies any fevers/chills, recent antibiotic use, nocturnal symptoms, nausea/vomiting  Denies any recent constipation preceding this, she typically eats 3-4 prunes daily and moves her bowels regularly with formed stool  Denies any blood in the stool or urine  She stopped her prunes because of this  She had recent colonoscopy in March of this year which showed multiple small and large severe extensive diverticula causing moderate luminal narrowing (transversable) with no bleeding in the mid descending colon, distal descending colon, proximal sigmoid colon, mid sigmoid colon, and distal sigmoid colon  One benign appearing polyp removed from transverse colon which was colonic mucosa w/ no diagnostic abnormality on biopsy  Last EGD was done in February 2022 due to intermittent dysphagia/dry heaves which showed lower esophageal B ring which was dilated  REVIEW OF SYSTEMS IS OTHERWISE NEGATIVE  Historical Information   Past Medical History:   Diagnosis Date   • Adrenal abnormality [facility])     See endocrinologist for care-patient unsure of condition   • Chronic kidney disease     cyst on right kidney   • Colon polyp    • Disease of thyroid gland    • Dry heaves     hx of   • Dysphagia    • GERD (gastroesophageal reflux disease)    • Hearing loss     wears B/L hearing aides   • History of transfusion 1978    Tubal Pregnancy   • Hyperlipidemia    • Hypertension    • Irregular heart beat     cardio testing done recent  Oct 2021 = good   Hx skipped beats per pt   • Low back pain    • Osteoporosis scoliosis   • Skipped heart beats     checked by cardiologist (Dr Alena Bar told is no problem October 2021; also had ECHO and stress test   • Tubal pregnancy      Past Surgical History:   Procedure Laterality Date   • CARPAL TUNNEL RELEASE Bilateral    • COLONOSCOPY     • ECTOPIC PREGNANCY SURGERY     • EYE SURGERY      Defuction of Right Eye Sac   • NEUROPLASTY / TRANSPOSITION MEDIAN NERVE AT CARPAL TUNNEL BILATERAL     • OSTEOTOMY TOES Left     2 and 3rd phalanges   • OTHER SURGICAL HISTORY      Calcification removed from Right Thumb   • WA ARTHDSIS POST/POSTEROLATRL/POSTINTERBODY LUMBAR N/A 2021    Procedure: Minimally invasive transverse lumbar interbody fusion L4-5 from left-sided approach with decompressive laminectomy;  Surgeon: Manuel Mulligan MD;  [facility];  Service: Neurosurgery     Social History   Social History     Substance and Sexual Activity   Alcohol Use Not Currently     Social History     Substance and Sexual Activity   Drug Use Not Currently     Social History     Tobacco Use   Smoking Status Former   • Types: Cigarettes   • Quit date:    • Years since quittin 9   Smokeless Tobacco Never   Tobacco Comments    quit 40 years ago     Family History   Problem Relation Age of Onset   • Leukemia Mother    • Stomach cancer Father    • Aneurysm Brother    • Lung cancer Brother    • Kidney cancer Brother    • COPD Brother    • Aneurysm Brother    • Anuerysm Brother    • Hernia Brother    • Lung cancer Brother        Meds/Allergies       Current Outpatient Medications:   •  acetaminophen (TYLENOL) 325 mg tablet  •  ALPRAZolam (XANAX) 0 5 mg tablet  •  atorvastatin (LIPITOR) 20 mg tablet  •  CALCIUM PO  •  levothyroxine 25 mcg tablet  •  metFORMIN (GLUCOPHAGE-XR) 500 mg 24 hr tablet  •  metoprolol succinate (TOPROL-XL) 50 mg 24 hr tablet  •  multivitamin (THERAGRAN) TABS  •  triamcinolone (KENALOG) 0 1 % cream  •  VITAMIN D PO  •  VITAMIN E PO  •  polyethylene glycol (MIRALAX) 17 g packet    No Known Allergies        Objective     Blood pressure 167/96, pulse 99, height 5' 2" (1 575 m), weight 76 7 kg (169 lb)  Body mass index is 30 91 kg/m²  PHYSICAL EXAM:      General Appearance:   Alert, cooperative, no distress   HEENT:   Normocephalic, atraumatic, anicteric       Neck:  Supple, symmetrical, trachea midline   Lungs:   Clear to auscultation bilaterally; no rales, rhonchi or wheezing; respirations unlabored    Heart[de-identified]   Regular rate and rhythm; no murmur  Abdomen:   Soft, non-tender, non-distended; normal bowel sounds; no masses, no organomegaly    Genitalia:   Deferred    Rectal:   Deferred    Extremities:  No cyanosis, clubbing or edema    Skin:  No jaundice, rashes, or lesions    Lymph nodes:  No palpable cervical lymphadenopathy        Lab Results:   No visits with results within 1 Day(s) from this visit  Latest known visit with results is:   Hospital Outpatient Visit on 03/01/2022   Component Date Value   • Case Report 03/01/2022                      Value:Surgical Pathology Report                         Case: [id number]                                   Authorizing Provider:  Chino Borja MD      Collected:           03/01/2022 1409              Ordering Location:     [address] [place] [address] Received:            03/01/2022 [address] [place] [address]                                                                  Pathologist:           Diana Saucedo MD                                                         Specimen:    Large Intestine, Transverse Colon, cold bx prox transverse polyp                          • Final Diagnosis 03/01/2022                      Value: This result contains rich text formatting which cannot be displayed here  • Additional Information 03/01/2022                      Value: This result contains rich text formatting which cannot be displayed here  • Gross Description 03/01/2022                      Value: This result contains rich text formatting which cannot be displayed here  Radiology Results:   No results found

## 2022-12-07 ENCOUNTER — APPOINTMENT (OUTPATIENT)
Dept: LAB | Facility: CLINIC | Age: 77
End: 2022-12-07

## 2022-12-07 DIAGNOSIS — R19.4 CHANGE IN BOWEL HABIT: ICD-10-CM

## 2022-12-07 DIAGNOSIS — R10.30 LOWER ABDOMINAL PAIN: ICD-10-CM

## 2022-12-07 LAB
ANION GAP SERPL CALCULATED.3IONS-SCNC: 5 MMOL/L (ref 4–13)
BUN SERPL-MCNC: 11 MG/DL (ref 5–25)
CALCIUM SERPL-MCNC: 9.4 MG/DL (ref 8.4–10.2)
CHLORIDE SERPL-SCNC: 105 MMOL/L (ref 96–108)
CO2 SERPL-SCNC: 29 MMOL/L (ref 21–32)
CREAT SERPL-MCNC: 0.67 MG/DL (ref 0.6–1.3)
GFR SERPL CREATININE-BSD FRML MDRD: 83 ML/MIN/1.73SQ M
GLUCOSE SERPL-MCNC: 106 MG/DL (ref 65–140)
POTASSIUM SERPL-SCNC: 4 MMOL/L (ref 3.5–5.3)
SODIUM SERPL-SCNC: 139 MMOL/L (ref 135–147)

## 2022-12-10 ENCOUNTER — HOSPITAL ENCOUNTER (OUTPATIENT)
Dept: CT IMAGING | Facility: HOSPITAL | Age: 77
Discharge: HOME | End: 2022-12-10

## 2022-12-10 DIAGNOSIS — R10.30 LOWER ABDOMINAL PAIN: ICD-10-CM

## 2022-12-10 DIAGNOSIS — R19.4 CHANGE IN BOWEL HABIT: ICD-10-CM

## 2022-12-10 RX ADMIN — IOHEXOL 100 ML: 350 INJECTION, SOLUTION INTRAVENOUS at 08:28

## 2022-12-15 ENCOUNTER — TELEPHONE (OUTPATIENT)
Dept: GASTROENTEROLOGY | Facility: CLINIC | Age: 77
End: 2022-12-15

## 2022-12-15 NOTE — TELEPHONE ENCOUNTER
PT INFORMED OF CT SCAN RESULTS AND RECOMMENDATIONS, PT REPORTS PERSISTENT DIARRHEA EPISODES 2-3 TIMES DAILY

## 2022-12-15 NOTE — TELEPHONE ENCOUNTER
Patients GI provider:  Dr Tamie Rutherford    Number to return call: [phone]    Reason for call: Pt calling returning a results call      Scheduled procedure/appointment date if applicable: Appt  9/72/84

## 2022-12-16 DIAGNOSIS — R19.7 DIARRHEA, UNSPECIFIED TYPE: Primary | ICD-10-CM

## 2022-12-16 NOTE — TELEPHONE ENCOUNTER
LMOM PER CONSENT WITH RECOMMENDATIONS FOR STOOL STUDIES, ORDERS PLACED IN Epic, HOLD PRUNES, MAY START DAILY FIBER SUPPLEMENT, PLEASE CALL TO DISCUSS IF QUESTIONS

## 2022-12-21 ENCOUNTER — APPOINTMENT (OUTPATIENT)
Dept: LAB | Facility: HOSPITAL | Age: 77
End: 2022-12-21

## 2022-12-21 DIAGNOSIS — R19.7 DIARRHEA, UNSPECIFIED TYPE: ICD-10-CM

## 2022-12-22 LAB
C DIFF TOX GENS STL QL NAA+PROBE: NEGATIVE
CAMPYLOBACTER DNA SPEC NAA+PROBE: NORMAL
SALMONELLA DNA SPEC QL NAA+PROBE: NORMAL
SHIGA TOXIN STX GENE SPEC NAA+PROBE: NORMAL
SHIGELLA DNA SPEC QL NAA+PROBE: NORMAL

## 2022-12-29 LAB
G LAMBLIA AG STL QL IA: NEGATIVE
O+P STL CONC: NORMAL

## 2023-01-05 ENCOUNTER — TELEPHONE (OUTPATIENT)
Dept: GASTROENTEROLOGY | Facility: CLINIC | Age: 78
End: 2023-01-05

## 2023-01-05 NOTE — TELEPHONE ENCOUNTER
Patients GI provider:  RADHA Fajardo    Number to return call: [phone]    Reason for call: Pt calling to go over the results of the tests done recently  States she was supposed to receive a call at 3:00 but never did and it's been over a week and half      Scheduled procedure/appointment date if applicable: 9/09/8938

## 2023-01-05 NOTE — TELEPHONE ENCOUNTER
PT INFORMED OF NEGATIVE STOOL STUDIES, PT C/O PERSISTENT DIARRHEA 4-5 TIMES DAILY  WHAT DO YOU RECOMMEND?

## 2023-01-06 DIAGNOSIS — K52.9 CHRONIC DIARRHEA: Primary | ICD-10-CM

## 2023-01-06 RX ORDER — CHOLESTYRAMINE 4 G/9G
1 POWDER, FOR SUSPENSION ORAL DAILY
Qty: 30 PACKET | Refills: 2 | Status: SHIPPED | OUTPATIENT
Start: 2023-01-06

## 2023-01-06 NOTE — TELEPHONE ENCOUNTER
Called patient, spoke with her in detail, reports loose stools 2-5 times daily pending the day  Will order questran to use once daily, call us next week if no improvement       Has fu next month

## 2023-01-11 ENCOUNTER — TELEPHONE (OUTPATIENT)
Dept: GASTROENTEROLOGY | Facility: CLINIC | Age: 78
End: 2023-01-11

## 2023-01-11 ENCOUNTER — OFFICE VISIT (OUTPATIENT)
Dept: GASTROENTEROLOGY | Facility: CLINIC | Age: 78
End: 2023-01-11

## 2023-01-11 VITALS
SYSTOLIC BLOOD PRESSURE: 187 MMHG | BODY MASS INDEX: 31.1 KG/M2 | HEIGHT: 62 IN | HEART RATE: 88 BPM | WEIGHT: 169 LBS | DIASTOLIC BLOOD PRESSURE: 96 MMHG

## 2023-01-11 DIAGNOSIS — R19.7 DIARRHEA, UNSPECIFIED TYPE: ICD-10-CM

## 2023-01-11 DIAGNOSIS — R19.4 CHANGE IN BOWEL HABIT: Primary | ICD-10-CM

## 2023-01-11 DIAGNOSIS — Z86.010 HISTORY OF COLON POLYPS: ICD-10-CM

## 2023-01-11 NOTE — TELEPHONE ENCOUNTER
Pt still having diarhea , Cholestyramine not working after 5 days  Can someone please call her back at [phone] or call her  [facility] at ?  Thanks Oral Lofton

## 2023-01-11 NOTE — PROGRESS NOTES
[facility] - Outpatient Follow-up Note  Andrea Pierre 76 y o  female MRN: [id number]  Encounter: [id number]          ASSESSMENT AND PLAN:      1  Change in bowel habit  Patient has presented with change in her bowel habits  She is having loose stools  She is moving her bowels 3 or 4 times a day  Stools is mushy or liquid  She does have history of constipation before  This is happened for the last 6 weeks  Patient had work-up done through primary care and stool studies have been negative  Patient denies any rectal bleeding or mucus per rectum  She denies any new medication intake  She denies also having any change in her mental status such as stress  Cause of this change is unclear  I have reviewed the medications she is currently taking  There is nothing that really explains a loose bowel movement  She is not taking any MiraLAX even though it is listed in her medications  - Colonoscopy; Future    2  Diarrhea, unspecified type  See above discussion  Patient will need a colonoscopy  Previously her colonoscopy prep was suboptimal   Repeat double prep has been explained to her  Colonoscopy will be scheduled in the near future  3  History of colon polyps  She does have history of colon polyp     ______________________________________________________________________    SUBJECTIVE: Diarrhea and change in bowel habits  There is no abdominal pain or discomfort  There is no rectal bleeding or mucus per rectum  There is no nausea or vomiting  She denies any weight loss or weight gain  Previously colonoscopy could not be done on 2 occasions because of poor prep  I have gone over again about her double prep for 2 days and schedule the colonoscopy again  Further recommendations will follow      REVIEW OF SYSTEMS IS OTHERWISE NEGATIVE        Historical Information   Past Medical History:   Diagnosis Date   • Adrenal abnormality [facility])     See endocrinologist for care-patient unsure of condition   • Chronic kidney disease     cyst on right kidney   • Colon polyp    • Disease of thyroid gland    • Dry heaves     hx of   • Dysphagia    • GERD (gastroesophageal reflux disease)    • Hearing loss     wears B/L hearing aides   • History of transfusion 1978    Tubal Pregnancy   • Hyperlipidemia    • Hypertension    • Irregular heart beat     cardio testing done recent  Oct 2021 = good   Hx skipped beats per pt   • Low back pain    • Osteoporosis scoliosis   • Skipped heart beats     checked by cardiologist (Dr oJe Jackson told is no problem 2021; also had ECHO and stress test   • Tubal pregnancy      Past Surgical History:   Procedure Laterality Date   • CARPAL TUNNEL RELEASE Bilateral    • COLONOSCOPY     • ECTOPIC PREGNANCY SURGERY     • EYE SURGERY      Defuction of Right Eye Sac   • NEUROPLASTY / TRANSPOSITION MEDIAN NERVE AT CARPAL TUNNEL BILATERAL     • OSTEOTOMY TOES Left     2 and 3rd phalanges   • OTHER SURGICAL HISTORY      Calcification removed from Right Thumb   • [place] ARTHRODESIS COMBINED TQ 1NTRSPC LUMBAR N/A 2021    Procedure: Minimally invasive transverse lumbar interbody fusion L4-5 from left-sided approach with decompressive laminectomy;  Surgeon: Gabby Acosta MD;  [facility];  Service: Neurosurgery     Social History   Social History     Substance and Sexual Activity   Alcohol Use Not Currently     Social History     Substance and Sexual Activity   Drug Use Not Currently     Social History     Tobacco Use   Smoking Status Former   • Types: Cigarettes   • Quit date:    • Years since quittin 0   Smokeless Tobacco Never   Tobacco Comments    quit 40 years ago     Family History   Problem Relation Age of Onset   • Leukemia Mother    • Stomach cancer Father    • Aneurysm Brother    • Lung cancer Brother    • Kidney cancer Brother    • COPD Brother    • Aneurysm Brother    • Anuerysm Brother    • Hernia Brother    • Lung cancer Brother Meds/Allergies       Current Outpatient Medications:   •  acetaminophen (TYLENOL) 325 mg tablet  •  ALPRAZolam (XANAX) 0 5 mg tablet  •  atorvastatin (LIPITOR) 20 mg tablet  •  CALCIUM PO  •  cholestyramine (QUESTRAN) 4 g packet  •  levothyroxine 25 mcg tablet  •  metFORMIN (GLUCOPHAGE-XR) 500 mg 24 hr tablet  •  metoprolol succinate (TOPROL-XL) 50 mg 24 hr tablet  •  multivitamin (THERAGRAN) TABS  •  polyethylene glycol (MIRALAX) 17 g packet  •  triamcinolone (KENALOG) 0 1 % cream  •  VITAMIN D PO  •  VITAMIN E PO    No Known Allergies        Objective     Blood pressure (!) 187/96, pulse 88, height 5' 2" (1 575 m), weight 76 7 kg (169 lb)  Body mass index is 30 91 kg/m²  PHYSICAL EXAM:      General Appearance:   Alert, cooperative, no distress   HEENT:   Normocephalic, atraumatic, anicteric      Neck:  Supple, symmetrical, trachea midline   Lungs:   Clear to auscultation bilaterally; no rales, rhonchi or wheezing; respirations unlabored    Heart[de-identified]   Regular rate and rhythm; no murmur, rub, or gallop  Abdomen:   Soft, non-tender, non-distended; normal bowel sounds; no masses, no organomegaly    Genitalia:   Deferred    Rectal:   Deferred    Extremities:  No cyanosis, clubbing or edema    Pulses:  2+ and symmetric    Skin:  No jaundice, rashes, or lesions    Lymph nodes:  No palpable cervical lymphadenopathy        Lab Results:   No visits with results within 1 Day(s) from this visit  Latest known visit with results is:   Appointment on 12/21/2022   Component Date Value   • Salmonella sp PCR 12/21/2022 None Detected    • Shigella sp/Enteroinvasi* 12/21/2022 None Detected    • Campylobacter sp (jejuni* 12/21/2022 None Detected    • Shiga toxin 1/Shiga toxi* 12/21/2022 None Detected    • Ova + Parasite Exam 12/21/2022 No ova, cysts, or parasites seen     One negative specimen does not rule out the possibility of a  parasitic infection      • Giardia Ag, Stl 12/21/2022 Negative    • C difficile toxin by PCR 12/21/2022 Negative          Radiology Results:   No results found

## 2023-01-11 NOTE — TELEPHONE ENCOUNTER
[facility] Assessment    Name: Robi James  YOB: 1945  Last Height: 5' 2" (1 575 m)  Last weight: 76 7 kg (169 lb)  BMI: 30 91 kg/m²  Procedure: Colon  Diagnosis: procedure not carried out-poor prep  Date of procedure: 02/22/23  Prep: 2 day prep  Responsible : yes   Phone#: [phone]  Name completing form: Ruben Llamas  Date form completed: 01/11/23      If the patient answers yes to any of these questions, schedule in a hospital  Are you pregnant: No  Do you rely on a wheelchair for mobility: No  Have you been diagnosed with End Stage Renal Disease (ESRD): No  Do you need oxygen during the day: No  Have you had a heart attack or stroke within the past three months: No  Have you had a seizure within the past three months: No  Have you ever been informed by anesthesia that you have a difficult airway: No  Additional Questions  Have you had any cardiac testing or are under the care of a Cardiologist (see cardiac list): No  Cardiac list:   Do you have an implanted cardiac defibrillator: No (Comment:  This patient should be scheduled in the hospital)    Have any bleeding problems, such as anemia or hemophilia (If patient has H&H result below 8, schedule in hospital   H&H must be within 30 days of procedure): No    Had an organ transplant within the past 3 months: No    Do you have any present infections: No  Do you get short of breath when walking a few blocks: No  Have you been diagnosed with diabetes: Yes  Comments (provide cardiac provider information if applicable):

## 2023-02-09 ENCOUNTER — TELEPHONE (OUTPATIENT)
Dept: GASTROENTEROLOGY | Facility: CLINIC | Age: 78
End: 2023-02-09

## 2023-02-09 NOTE — TELEPHONE ENCOUNTER
Spoke with patient reminding her of her 2/22 egd/colon with Dr Shannan Espinoza at [facility]  She has her  bringing her, she has her prep and will be called day prior with arrival time

## 2023-02-16 DIAGNOSIS — Z86.010 HISTORY OF COLON POLYPS: Primary | ICD-10-CM

## 2023-02-22 ENCOUNTER — ANESTHESIA (OUTPATIENT)
Dept: GASTROENTEROLOGY | Facility: AMBULATORY SURGERY CENTER | Age: 78
End: 2023-02-22

## 2023-02-22 ENCOUNTER — HOSPITAL ENCOUNTER (OUTPATIENT)
Dept: GASTROENTEROLOGY | Facility: AMBULATORY SURGERY CENTER | Age: 78
Discharge: HOME | End: 2023-02-22

## 2023-02-22 ENCOUNTER — ANESTHESIA EVENT (OUTPATIENT)
Dept: GASTROENTEROLOGY | Facility: AMBULATORY SURGERY CENTER | Age: 78
End: 2023-02-22

## 2023-02-22 VITALS
OXYGEN SATURATION: 96 % | BODY MASS INDEX: 29.44 KG/M2 | TEMPERATURE: 97.7 F | DIASTOLIC BLOOD PRESSURE: 66 MMHG | WEIGHT: 160 LBS | RESPIRATION RATE: 18 BRPM | HEART RATE: 79 BPM | HEIGHT: 62 IN | SYSTOLIC BLOOD PRESSURE: 128 MMHG

## 2023-02-22 DIAGNOSIS — R11.10 DRY HEAVES: ICD-10-CM

## 2023-02-22 DIAGNOSIS — R19.4 CHANGE IN BOWEL HABIT: ICD-10-CM

## 2023-02-22 RX ORDER — SODIUM CHLORIDE, SODIUM LACTATE, POTASSIUM CHLORIDE, CALCIUM CHLORIDE 600; 310; 30; 20 MG/100ML; MG/100ML; MG/100ML; MG/100ML
125 INJECTION, SOLUTION INTRAVENOUS CONTINUOUS
Status: DISCONTINUED | OUTPATIENT
Start: 2023-02-22 | End: 2023-02-26 | Stop reason: HOSPADM

## 2023-02-22 RX ORDER — LIDOCAINE HYDROCHLORIDE 10 MG/ML
INJECTION, SOLUTION EPIDURAL; INFILTRATION; INTRACAUDAL; PERINEURAL AS NEEDED
Status: DISCONTINUED | OUTPATIENT
Start: 2023-02-22 | End: 2023-02-22

## 2023-02-22 RX ORDER — PROPOFOL 10 MG/ML
INJECTION, EMULSION INTRAVENOUS AS NEEDED
Status: DISCONTINUED | OUTPATIENT
Start: 2023-02-22 | End: 2023-02-22

## 2023-02-22 RX ORDER — SODIUM CHLORIDE 9 MG/ML
30 INJECTION, SOLUTION INTRAVENOUS CONTINUOUS
Status: DISCONTINUED | OUTPATIENT
Start: 2023-02-22 | End: 2023-02-22

## 2023-02-22 RX ORDER — SODIUM CHLORIDE, SODIUM LACTATE, POTASSIUM CHLORIDE, CALCIUM CHLORIDE 600; 310; 30; 20 MG/100ML; MG/100ML; MG/100ML; MG/100ML
20 INJECTION, SOLUTION INTRAVENOUS CONTINUOUS
Status: DISCONTINUED | OUTPATIENT
Start: 2023-02-22 | End: 2023-02-26 | Stop reason: HOSPADM

## 2023-02-22 RX ADMIN — PROPOFOL 20 MG: 10 INJECTION, EMULSION INTRAVENOUS at 09:52

## 2023-02-22 RX ADMIN — PROPOFOL 50 MG: 10 INJECTION, EMULSION INTRAVENOUS at 09:48

## 2023-02-22 RX ADMIN — SODIUM CHLORIDE, SODIUM LACTATE, POTASSIUM CHLORIDE, CALCIUM CHLORIDE: 600; 310; 30; 20 INJECTION, SOLUTION INTRAVENOUS at 09:40

## 2023-02-22 RX ADMIN — PROPOFOL 30 MG: 10 INJECTION, EMULSION INTRAVENOUS at 09:50

## 2023-02-22 RX ADMIN — PROPOFOL 30 MG: 10 INJECTION, EMULSION INTRAVENOUS at 09:55

## 2023-02-22 RX ADMIN — LIDOCAINE HYDROCHLORIDE 50 MG: 10 INJECTION, SOLUTION EPIDURAL; INFILTRATION; INTRACAUDAL; PERINEURAL at 09:48

## 2023-02-22 RX ADMIN — PROPOFOL 20 MG: 10 INJECTION, EMULSION INTRAVENOUS at 09:59

## 2023-02-22 NOTE — INTERVAL H&P NOTE
H&P reviewed  After examining the patient I find no changes in the patients condition since the H&P had been written      Vitals:    02/22/23 0907   BP: 143/67   Pulse: 94   Resp: 18   Temp: 97 7 °F (36 5 °C)   SpO2: 96%

## 2023-02-22 NOTE — ANESTHESIA PREPROCEDURE EVALUATION
Procedure:  EGD  COLONOSCOPY    Relevant Problems   CARDIO   (+) Hypertension      MUSCULOSKELETAL   (+) Lower back pain   (+) Neurogenic claudication due to lumbar spinal stenosis   (+) Scoliosis deformity of spine      NEURO/PSYCH   (+) Chronic right shoulder pain   (+) Neurogenic claudication due to lumbar spinal stenosis      Other   (+) History of back surgery   (+) Spinal stenosis at L4-L5 level      GERD   HLD   Adrenal adenoma: Patient states she has adrenal mass on her kidney which is has been worked up and is followed by an endocrinologist every 6 months  No issues  Denies any flushing, palpitations, hot flashes  Physical Exam    Airway    Mallampati score: II  TM Distance: >3 FB  Neck ROM: full     Dental   lower dentures,     Cardiovascular      Pulmonary      Other Findings        Anesthesia Plan  ASA Score- 2     Anesthesia Type- IV sedation with anesthesia with ASA Monitors  Additional Monitors:   Airway Plan:           Plan Factors-    Chart reviewed  Patient summary reviewed  Patient is not a current smoker  Obstructive sleep apnea risk education given perioperatively  Induction- intravenous  Postoperative Plan-     Informed Consent- Anesthetic plan and risks discussed with patient  I personally reviewed this patient with the CRNA  Discussed and agreed on the Anesthesia Plan with the CRNA Gerhardt Sep

## 2023-02-22 NOTE — ANESTHESIA POSTPROCEDURE EVALUATION
Post-Op Assessment Note    CV Status:  Stable  Pain Score: 0    Pain management: adequate     Mental Status:  Alert and awake   Hydration Status:  Euvolemic   PONV Controlled:  Controlled   Airway Patency:  Patent      Post Op Vitals Reviewed: Yes      Staff: CRNA         No notable events documented      BP   112/61   Temp      Pulse  81   Resp   16   SpO2 99

## 2023-02-22 NOTE — DISCHARGE SUMMARY
Discharge Summary - Mandy Lora 76 y o  female MRN: [id number]    Unit/Bed#:  Encounter: [id number]    Admission Date: 2/22/2023    Admitting Diagnosis: Change in bowel habit [R19 4]    HPI: History of diarrhea  Previous colonoscopy was incomplete to sigmoid    Procedures Performed: No orders of the defined types were placed in this encounter  Summary of Hospital Course: Tolerated procedure    Significant Findings, Care, Treatment and Services Provided: Significant diverticulosis noted in the sigmoid colon with large diverticulum was present in the sigmoid  Polyp removed from the cecum  Random biopsies taken from left and right colon    Complications: None    Discharge Diagnosis: See above    Medical Problems     Resolved Problems  Date Reviewed: 2/22/2023   None         Condition at Discharge: good         Discharge instructions/Information to patient and family:   See after visit summary for information provided to patient and family  Provisions for Follow-Up Care:  See after visit summary for information related to follow-up care and any pertinent home health orders        PCP: Param Siddiqi MD    Disposition: Home

## 2023-02-22 NOTE — H&P
History and Physical -  [facility]  Maged Kelley 76 y o  female MRN: [id number]                  HPI: Maged Kelley is a 76y o  year old female who presents for colonoscopy for change in bowel habits and diarrhea  Patient has history of extensive sigmoid diverticulosis  REVIEW OF SYSTEMS: Per the HPI, and otherwise unremarkable      Historical Information   Past Medical History:   Diagnosis Date   • Adrenal abnormality [facility])     See endocrinologist for care-patient unsure of condition   • Change in bowel habit     diarrhea-started end of November 2022   • Chronic kidney disease     cyst on right kidney   • Colon polyp    • Disease of thyroid gland    • Dry heaves     hx of   • Dysphagia    • GERD (gastroesophageal reflux disease)    • Hearing loss     wears B/L hearing aides   • History of transfusion 1978    Tubal Pregnancy   • Hyperlipidemia    • Hypertension    • Irregular heart beat     last saw Dr Shaneka Strong 2 months ago: denies chest pain, SOB or palpitations per patient  2/22/23   • Low back pain    • Osteoporosis scoliosis   • Skipped heart beats     checked by cardiologist (Dr Vertie Litten told is no problem October 2021; also had ECHO and stress test   • Tubal pregnancy      Past Surgical History:   Procedure Laterality Date   • CARPAL TUNNEL RELEASE Bilateral    • COLONOSCOPY     • ECTOPIC PREGNANCY SURGERY     • EGD     • EYE SURGERY      Defuction of Right Eye Sac   • NEUROPLASTY / TRANSPOSITION MEDIAN NERVE AT CARPAL TUNNEL BILATERAL     • OSTEOTOMY TOES Left     2 and 3rd phalanges   • OTHER SURGICAL HISTORY      Calcification removed from Right Thumb   • [place] ARTHRODESIS COMBINED TQ 1NTRSPC LUMBAR N/A 11/01/2021    Procedure: Minimally invasive transverse lumbar interbody fusion L4-5 from left-sided approach with decompressive laminectomy;  Surgeon: Laz Bobo MD;  [facility];  Service: Neurosurgery     Social History   Social History     Substance and Sexual Activity   Alcohol Use Not Currently     Social History     Substance and Sexual Activity   Drug Use Not Currently     Social History     Tobacco Use   Smoking Status Former   • Types: Cigarettes   • Quit date: 0   • Years since quittin 1   Smokeless Tobacco Never   Tobacco Comments    quit 40 years ago     Family History   Problem Relation Age of Onset   • Leukemia Mother    • Stomach cancer Father    • Aneurysm Brother    • Lung cancer Brother    • Kidney cancer Brother    • COPD Brother    • Aneurysm Brother    • Anuerysm Brother    • Hernia Brother    • Lung cancer Brother        Meds/Allergies       Current Outpatient Medications:   •  acetaminophen (TYLENOL) 325 mg tablet  •  atorvastatin (LIPITOR) 20 mg tablet  •  CALCIUM PO  •  levothyroxine 25 mcg tablet  •  metFORMIN (GLUCOPHAGE-XR) 500 mg 24 hr tablet  •  metoprolol succinate (TOPROL-XL) 50 mg 24 hr tablet  •  multivitamin (THERAGRAN) TABS  •  VITAMIN D PO  •  VITAMIN E PO  •  cholestyramine (QUESTRAN) 4 g packet  •  polyethylene glycol (MIRALAX) 17 g packet  •  triamcinolone (KENALOG) 0 1 % cream    Current Facility-Administered Medications:   •  lactated ringers infusion, 125 mL/hr, Intravenous, Continuous    No Known Allergies    Objective     /67   Pulse 94   Temp 97 7 °F (36 5 °C) (Skin)   Resp 18   Ht 5' 2" (1 575 m)   Wt 72 6 kg (160 lb)   SpO2 96%   BMI 29 26 kg/m²       PHYSICAL EXAM    Gen: NAD  Head: NCAT  CV: RRR  CHEST: Clear  ABD: soft, NT/ND  EXT: no edema      ASSESSMENT/PLAN:  This is a 76y o  year old female here for colonoscopy, and she is stable and optimized for her procedure

## 2023-07-25 ENCOUNTER — HOSPITAL ENCOUNTER (OUTPATIENT)
Dept: CT IMAGING | Facility: HOSPITAL | Age: 78
Discharge: HOME | End: 2023-07-25
Payer: COMMERCIAL

## 2023-07-25 DIAGNOSIS — E83.50 DISORDER OF CALCIUM METABOLISM: ICD-10-CM

## 2023-07-25 PROCEDURE — G1004 CDSM NDSC: HCPCS

## 2023-07-25 PROCEDURE — 75571 CT HRT W/O DYE W/CA TEST: CPT

## 2024-02-05 RX ORDER — MECLIZINE HYDROCHLORIDE 25 MG/1
25 TABLET ORAL EVERY 6 HOURS PRN
COMMUNITY

## 2024-02-07 ENCOUNTER — OFFICE VISIT (OUTPATIENT)
Dept: OBGYN CLINIC | Facility: CLINIC | Age: 79
End: 2024-02-07
Payer: MEDICARE

## 2024-02-07 ENCOUNTER — APPOINTMENT (OUTPATIENT)
Dept: RADIOLOGY | Facility: AMBULARY SURGERY CENTER | Age: 79
End: 2024-02-07
Attending: ORTHOPAEDIC SURGERY
Payer: MEDICARE

## 2024-02-07 VITALS — HEIGHT: 62 IN | BODY MASS INDEX: 29.44 KG/M2 | WEIGHT: 160 LBS

## 2024-02-07 DIAGNOSIS — G89.29 CHRONIC RIGHT SHOULDER PAIN: Primary | ICD-10-CM

## 2024-02-07 DIAGNOSIS — M25.511 CHRONIC RIGHT SHOULDER PAIN: Primary | ICD-10-CM

## 2024-02-07 DIAGNOSIS — M19.011 OSTEOARTHRITIS OF RIGHT GLENOHUMERAL JOINT: ICD-10-CM

## 2024-02-07 DIAGNOSIS — M25.511 RIGHT SHOULDER PAIN, UNSPECIFIED CHRONICITY: ICD-10-CM

## 2024-02-07 DIAGNOSIS — M75.101 NONTRAUMATIC TEAR OF RIGHT ROTATOR CUFF, UNSPECIFIED TEAR EXTENT: ICD-10-CM

## 2024-02-07 PROCEDURE — 73030 X-RAY EXAM OF SHOULDER: CPT

## 2024-02-07 PROCEDURE — 99204 OFFICE O/P NEW MOD 45 MIN: CPT | Performed by: ORTHOPAEDIC SURGERY

## 2024-02-07 NOTE — PROGRESS NOTES
Assessment:  1. Chronic right shoulder pain  XR shoulder 2+ vw right    MRI shoulder right wo contrast      2. Osteoarthritis of right glenohumeral joint  MRI shoulder right wo contrast      3. Nontraumatic tear of right rotator cuff, unspecified tear extent  MRI shoulder right wo contrast        Patient Active Problem List   Diagnosis    Chronic right shoulder pain    Lower back pain    Spondylolisthesis of lumbar region    Spinal stenosis at L4-L5 level    Neurogenic claudication due to lumbar spinal stenosis    Scoliosis deformity of spine    Hypertension    Intractable nausea and vomiting    History of back surgery    Hypokalemia    Drug-induced constipation    Encounter for postoperative care related to surgical joint fusion           Plan      78 y.o. female with right shoulder pain. Upon review of the x-rays, a thorough history and my examination, Ama is presenting with signs and symptoms consistent with glenohumeral osteoarthritis and degenerative rotator cuff tear suspected. Diagnosis, treatment options and associated risks were discussed with the patient including no treatment, nonsurgical treatment and potential for surgical intervention.  The patient was given the opportunity to ask questions regarding each. Based on patients clinical exam and extensive weakness an MRI is warranted for further evaluation. Follow up after MRI.      Subjective:     Patient ID:    Chief Complaint:Ama Arteaga 78 y.o. female      HPI    Patient presents for initial evaluation of right shoulder pain.  Patient's pain is chronic, she did see Dr. Amador 3/8/2021 for the same shoulder.  At that time patient was not interested in physical therapy, Dr. Amador did provide her with a home exercise program which she states helped.  In regards to her acute shoulder pain symptoms started 3 weeks ago without injury or trauma.  Pain is localized to the lateral shoulder, it is an intermittent ache aggravated with internal rotation.   She notes limitations and weakness with internal rotation that impact her activities of daily living.    The following portions of the patient's history were reviewed and updated as appropriate: allergies, current medications, past family history, past social history, past surgical history and problem list.        Social History     Socioeconomic History    Marital status: /Civil Union     Spouse name: Not on file    Number of children: Not on file    Years of education: Not on file    Highest education level: Not on file   Occupational History    Not on file   Tobacco Use    Smoking status: Former     Current packs/day: 0.00     Types: Cigarettes     Quit date:      Years since quittin.1    Smokeless tobacco: Never    Tobacco comments:     quit 40 years ago   Vaping Use    Vaping status: Never Used   Substance and Sexual Activity    Alcohol use: Not Currently    Drug use: Not Currently    Sexual activity: Not on file   Other Topics Concern    Not on file   Social History Narrative    Not on file     Social Determinants of Health     Financial Resource Strain: Not on file   Food Insecurity: Not on file   Transportation Needs: Not on file   Physical Activity: Not on file   Stress: Not on file   Social Connections: Not on file   Intimate Partner Violence: Not on file   Housing Stability: Not on file     Past Medical History:   Diagnosis Date    Adrenal abnormality (HCC)     See endocrinologist for care-patient unsure of condition    Change in bowel habit     diarrhea-started end of 2022    Chronic kidney disease     cyst on right kidney    Colon polyp     Disease of thyroid gland     Dry heaves     hx of    Dysphagia     GERD (gastroesophageal reflux disease)     Hearing loss     wears B/L hearing aides    History of transfusion     Tubal Pregnancy    Hyperlipidemia     Hypertension     Irregular heart beat     last saw Dr. Marshall 2 months ago: denies chest pain, SOB or palpitations per  patient  2/22/23    Low back pain     Osteoporosis scoliosis    Skipped heart beats     checked by cardiologist (Dr. Marshall)-was told is no problem October 2021; also had ECHO and stress test    Tubal pregnancy      Past Surgical History:   Procedure Laterality Date    CARPAL TUNNEL RELEASE Bilateral     COLONOSCOPY      ECTOPIC PREGNANCY SURGERY      EGD      EYE SURGERY      Defuction of Right Eye Sac    NEUROPLASTY / TRANSPOSITION MEDIAN NERVE AT CARPAL TUNNEL BILATERAL      OSTEOTOMY TOES Left     2 and 3rd phalanges    OTHER SURGICAL HISTORY      Calcification removed from Right Thumb    [place] ARTHRODESIS COMBINED TQ 1NTRSPC LUMBAR N/A 11/01/2021    Procedure: Minimally invasive transverse lumbar interbody fusion L4-5 from left-sided approach with decompressive laminectomy;  Surgeon: Stanislav Ervin MD;  Location: [facility];  Service: Neurosurgery     No Known Allergies  Current Outpatient Medications on File Prior to Visit   Medication Sig Dispense Refill    acetaminophen (TYLENOL) 325 mg tablet Take 2 tablets (650 mg total) by mouth every 6 (six) hours  0    atorvastatin (LIPITOR) 20 mg tablet Take 20 mg by mouth daily at bedtime       CALCIUM PO Take by mouth      cholestyramine (QUESTRAN) 4 g packet Take 1 packet (4 g total) by mouth in the morning 1 hour separate from other meds 30 packet 2    levothyroxine 25 mcg tablet Take 25 mcg by mouth daily in the early morning       meclizine (ANTIVERT) 25 mg tablet Take 25 mg by mouth every 6 (six) hours as needed      metFORMIN (GLUCOPHAGE-XR) 500 mg 24 hr tablet       metoprolol succinate (TOPROL-XL) 50 mg 24 hr tablet Take 50 mg by mouth daily at bedtime       multivitamin (THERAGRAN) TABS Take 1 tablet by mouth daily      polyethylene glycol (MIRALAX) 17 g packet Take 17 g by mouth daily as needed (constipation) for up to 14 days (Patient not taking: Reported on 12/16/2021) 14 each 0    triamcinolone (KENALOG) 0.1 % cream APPLY TO RASH AREA TWICE DAILY ONLY WHEN  FLARED APPLY BARRIER OINTMENT ON TOP      VITAMIN D PO Take by mouth      VITAMIN E PO Take by mouth       No current facility-administered medications on file prior to visit.              Objective:    Review of Systems   Constitutional:  Negative for chills and fever.   HENT:  Negative for ear pain and sore throat.    Eyes:  Negative for pain and visual disturbance.   Respiratory:  Negative for cough and shortness of breath.    Cardiovascular:  Negative for chest pain and palpitations.   Gastrointestinal:  Negative for abdominal pain and vomiting.   Genitourinary:  Negative for dysuria and hematuria.   Musculoskeletal:  Negative for arthralgias and back pain.   Skin:  Negative for color change and rash.   Neurological:  Negative for seizures and syncope.   All other systems reviewed and are negative.      Right Shoulder Exam     Tenderness   The patient is experiencing no tenderness.    Range of Motion   Forward flexion:  normal   Internal rotation 0 degrees:  abnormal Right shoulder internal rotation 0 degrees: to back pocket.    Muscle Strength   Internal rotation: 3/5   External rotation: 3/5     Other   Erythema: absent  Sensation: normal  Pulse: present    Comments:  3/5 empty can               Physical Exam  Vitals and nursing note reviewed.   HENT:      Head: Normocephalic.   Eyes:      Extraocular Movements: Extraocular movements intact.   Cardiovascular:      Rate and Rhythm: Normal rate.      Pulses: Normal pulses.   Pulmonary:      Effort: Pulmonary effort is normal.   Musculoskeletal:      Cervical back: Normal range of motion.      Comments: See ortho exam   Skin:     General: Skin is warm and dry.   Neurological:      General: No focal deficit present.      Mental Status: She is alert.   Psychiatric:         Behavior: Behavior normal.         Procedures   No procedures performed today    I have personally reviewed pertinent films in PACS.    X-ray of the right shoulder obtained today demonstrate no  "acute osseous abnormalities. Degenerative changes of glenohumeral and AC joints noted.     Scribe Attestation      I,:  Betzaida Manriquez am acting as a scribe while in the presence of the attending physician.:       I,:  Kenneth Mercado, DO personally performed the services described in this documentation    as scribed in my presence.:               Portions of the record may have been created with voice recognition software.  Occasional wrong word or \"sound a like\" substitutions may have occurred due to the inherent limitations of voice recognition software.  Read the chart carefully and recognize, using context, where substitutions have occurred.    "

## 2024-03-02 ENCOUNTER — HOSPITAL ENCOUNTER (OUTPATIENT)
Dept: MRI IMAGING | Facility: HOSPITAL | Age: 79
Discharge: HOME | End: 2024-03-02
Attending: ORTHOPAEDIC SURGERY
Payer: MEDICARE

## 2024-03-02 DIAGNOSIS — G89.29 CHRONIC RIGHT SHOULDER PAIN: ICD-10-CM

## 2024-03-02 DIAGNOSIS — M75.101 NONTRAUMATIC TEAR OF RIGHT ROTATOR CUFF, UNSPECIFIED TEAR EXTENT: ICD-10-CM

## 2024-03-02 DIAGNOSIS — M25.511 CHRONIC RIGHT SHOULDER PAIN: ICD-10-CM

## 2024-03-02 DIAGNOSIS — M19.011 OSTEOARTHRITIS OF RIGHT GLENOHUMERAL JOINT: ICD-10-CM

## 2024-03-02 PROCEDURE — G1004 CDSM NDSC: HCPCS

## 2024-03-02 PROCEDURE — 73221 MRI JOINT UPR EXTREM W/O DYE: CPT

## 2024-03-08 ENCOUNTER — OFFICE VISIT (OUTPATIENT)
Dept: OBGYN CLINIC | Facility: CLINIC | Age: 79
End: 2024-03-08
Payer: MEDICARE

## 2024-03-08 VITALS — WEIGHT: 160 LBS | BODY MASS INDEX: 29.44 KG/M2 | HEIGHT: 62 IN

## 2024-03-08 DIAGNOSIS — M75.101 NONTRAUMATIC TEAR OF RIGHT ROTATOR CUFF, UNSPECIFIED TEAR EXTENT: Primary | ICD-10-CM

## 2024-03-08 PROCEDURE — 99213 OFFICE O/P EST LOW 20 MIN: CPT | Performed by: ORTHOPAEDIC SURGERY

## 2024-03-08 NOTE — PATIENT INSTRUCTIONS
MRI findings right shoulder:     Complete, full-thickness insertional tears of the supraspinatus and infraspinatus tendons with tendon retraction to the level of the glenoid. Moderate supraspinatus and mild infraspinatus muscular atrophy.     Severe subscapularis tendinosis without tear. Mild muscular atrophy.     Mild glenohumeral and acromioclavicular joint osteoarthritis.

## 2024-03-08 NOTE — PROGRESS NOTES
Assessment:  1. Nontraumatic tear of right rotator cuff, unspecified tear extent  Ambulatory Referral to Orthopedic Surgery        Patient Active Problem List   Diagnosis    Chronic right shoulder pain    Lower back pain    Spondylolisthesis of lumbar region    Spinal stenosis at L4-L5 level    Neurogenic claudication due to lumbar spinal stenosis    Scoliosis deformity of spine    Hypertension    Intractable nausea and vomiting    History of back surgery    Hypokalemia    Drug-induced constipation    Encounter for postoperative care related to surgical joint fusion    Nontraumatic tear of right rotator cuff       Plan:    78 y.o. female  with rotator cuff tears of the right shoulder    Discussed MRI findings of the right shoulder  Discussed underlying pathology of diagnosis  Discussed treatments including surgical intervention, steroid injection, and PRP injection.  Discussed would not recommend multiple steroid injections to the shoulder secondary to risk of tendon rupture.  Discussed PRP injections are an out-of-pocket cost to patient at $250.  Discussed referral to Dr. Lobo to discuss possible surgical intervention.  Discussed risk for no surgical intervention would include worsening of atrophy and possible need for total shoulder in the future.  Patient agreeable for consultation with Dr. Lobo.  Referral provided today.  Patient to follow-up with Dr. Lobo for surgical consultation     The assessment and plan were formulated by Dr. Mercado and I assisted in carrying it out.    Subjective:   Patient ID: Ama Arteaga is a 78 y.o. female .    HPI    Patient presents to the office for follow up of right shoulder pain.  Patient notes great improvement with range of motion of the right shoulder.  Patient denies any pain in the right shoulder today.  Patient presents today to follow-up results of MRI of right shoulder    The following portions of the patient's history were reviewed and updated as appropriate:  allergies, current medications, past family history, past social history, past surgical history and problem list.    Social History     Socioeconomic History    Marital status: /Civil Union     Spouse name: Not on file    Number of children: Not on file    Years of education: Not on file    Highest education level: Not on file   Occupational History    Not on file   Tobacco Use    Smoking status: Former     Current packs/day: 0.00     Types: Cigarettes     Quit date:      Years since quittin.2    Smokeless tobacco: Never    Tobacco comments:     quit 40 years ago   Vaping Use    Vaping status: Never Used   Substance and Sexual Activity    Alcohol use: Not Currently    Drug use: Not Currently    Sexual activity: Not on file   Other Topics Concern    Not on file   Social History Narrative    Not on file     Social Determinants of Health     Financial Resource Strain: Not on file   Food Insecurity: Not on file   Transportation Needs: Not on file   Physical Activity: Not on file   Stress: Not on file   Social Connections: Not on file   Intimate Partner Violence: Not on file   Housing Stability: Not on file     Past Medical History:   Diagnosis Date    Adrenal abnormality (HCC)     See endocrinologist for care-patient unsure of condition    Change in bowel habit     diarrhea-started end of 2022    Chronic kidney disease     cyst on right kidney    Colon polyp     Disease of thyroid gland     Dry heaves     hx of    Dysphagia     GERD (gastroesophageal reflux disease)     Hearing loss     wears B/L hearing aides    History of transfusion     Tubal Pregnancy    Hyperlipidemia     Hypertension     Irregular heart beat     last saw Dr. Marshall 2 months ago: denies chest pain, SOB or palpitations per patient  23    Low back pain     Osteoporosis scoliosis    Skipped heart beats     checked by cardiologist (Dr. Marshall)-was told is no problem 2021; also had ECHO and stress test    Tubal  pregnancy      Past Surgical History:   Procedure Laterality Date    CARPAL TUNNEL RELEASE Bilateral     COLONOSCOPY      ECTOPIC PREGNANCY SURGERY      EGD      EYE SURGERY      Defuction of Right Eye Sac    NEUROPLASTY / TRANSPOSITION MEDIAN NERVE AT CARPAL TUNNEL BILATERAL      OSTEOTOMY TOES Left     2 and 3rd phalanges    OTHER SURGICAL HISTORY      Calcification removed from Right Thumb    [place] ARTHRODESIS COMBINED TQ 1NTRSPC LUMBAR N/A 11/01/2021    Procedure: Minimally invasive transverse lumbar interbody fusion L4-5 from left-sided approach with decompressive laminectomy;  Surgeon: Stanislav Ervin MD;  Location: [facility];  Service: Neurosurgery     No Known Allergies  Current Outpatient Medications on File Prior to Visit   Medication Sig Dispense Refill    acetaminophen (TYLENOL) 325 mg tablet Take 2 tablets (650 mg total) by mouth every 6 (six) hours  0    atorvastatin (LIPITOR) 20 mg tablet Take 20 mg by mouth daily at bedtime       CALCIUM PO Take by mouth      cholestyramine (QUESTRAN) 4 g packet Take 1 packet (4 g total) by mouth in the morning 1 hour separate from other meds 30 packet 2    levothyroxine 25 mcg tablet Take 25 mcg by mouth daily in the early morning       meclizine (ANTIVERT) 25 mg tablet Take 25 mg by mouth every 6 (six) hours as needed      metFORMIN (GLUCOPHAGE-XR) 500 mg 24 hr tablet       metoprolol succinate (TOPROL-XL) 50 mg 24 hr tablet Take 50 mg by mouth daily at bedtime       multivitamin (THERAGRAN) TABS Take 1 tablet by mouth daily      triamcinolone (KENALOG) 0.1 % cream APPLY TO RASH AREA TWICE DAILY ONLY WHEN FLARED APPLY BARRIER OINTMENT ON TOP      VITAMIN D PO Take by mouth      polyethylene glycol (MIRALAX) 17 g packet Take 17 g by mouth daily as needed (constipation) for up to 14 days (Patient not taking: Reported on 12/16/2021) 14 each 0    [DISCONTINUED] VITAMIN E PO Take by mouth       No current facility-administered medications on file prior to visit.        Review of Systems   Constitutional:  Negative for chills and fever.   HENT:  Negative for ear pain and sore throat.    Eyes:  Negative for pain and visual disturbance.   Respiratory:  Negative for cough and shortness of breath.    Cardiovascular:  Negative for chest pain and palpitations.   Gastrointestinal:  Negative for abdominal pain and vomiting.   Genitourinary:  Negative for dysuria and hematuria.   Musculoskeletal:  Positive for arthralgias. Negative for back pain.   Skin:  Negative for color change and rash.   Neurological:  Negative for seizures and syncope.   All other systems reviewed and are negative.    See HPi    Objective:    There were no vitals filed for this visit.    Physical Exam  Vitals and nursing note reviewed.   Constitutional:       General: She is not in acute distress.     Appearance: She is well-developed.   HENT:      Head: Normocephalic and atraumatic.   Eyes:      Conjunctiva/sclera: Conjunctivae normal.   Cardiovascular:      Rate and Rhythm: Normal rate and regular rhythm.      Heart sounds: No murmur heard.  Pulmonary:      Effort: Pulmonary effort is normal. No respiratory distress.      Breath sounds: Normal breath sounds.   Abdominal:      Palpations: Abdomen is soft.      Tenderness: There is no abdominal tenderness.   Musculoskeletal:         General: No swelling.      Cervical back: Neck supple.   Skin:     General: Skin is warm and dry.      Capillary Refill: Capillary refill takes less than 2 seconds.   Neurological:      Mental Status: She is alert.   Psychiatric:         Mood and Affect: Mood normal.         Right Shoulder Exam     Tenderness   The patient is experiencing no tenderness.    Range of Motion   Extension:  normal   External rotation:  normal   Forward flexion:  normal   Internal rotation 0 degrees:  normal   Internal rotation 90 degrees:  normal     Muscle Strength   Abduction: 4/5   Internal rotation: 4/5   External rotation: 4/5       Left Shoulder  "Exam     Range of Motion   Internal rotation 90 degrees:  normal              I have personally reviewed pertinent films in PACS and my interpretation is MRI right shoulder reveals full thickness tear of the supraspinatus and infraspinatus tendons with moderate supraspinatus and infraspinatus muscular atrophy.  Severe subscapularis tendinosis without tear and mild atrophy.    Procedures  No Procedures performed today     Portions of the record may have been created with voice recognition software.  Occasional wrong word or \"sound a like\" substitutions may have occurred due to the inherent limitations of voice recognition software.  Read the chart carefully and recognize, using context, where substitutions have occurred.   "

## 2024-03-11 ENCOUNTER — TELEPHONE (OUTPATIENT)
Dept: OTHER | Facility: OTHER | Age: 79
End: 2024-03-11

## 2024-04-01 ENCOUNTER — OFFICE VISIT (OUTPATIENT)
Dept: OBGYN CLINIC | Facility: CLINIC | Age: 79
End: 2024-04-01
Payer: MEDICARE

## 2024-04-01 VITALS
WEIGHT: 160 LBS | SYSTOLIC BLOOD PRESSURE: 130 MMHG | HEART RATE: 80 BPM | BODY MASS INDEX: 29.44 KG/M2 | HEIGHT: 62 IN | DIASTOLIC BLOOD PRESSURE: 88 MMHG

## 2024-04-01 DIAGNOSIS — M75.101 NONTRAUMATIC TEAR OF RIGHT ROTATOR CUFF, UNSPECIFIED TEAR EXTENT: Primary | ICD-10-CM

## 2024-04-01 PROCEDURE — 99213 OFFICE O/P EST LOW 20 MIN: CPT | Performed by: STUDENT IN AN ORGANIZED HEALTH CARE EDUCATION/TRAINING PROGRAM

## 2024-04-01 NOTE — PROGRESS NOTES
Ortho Sports Medicine Shoulder New Patient Visit     Assesment:   79 y.o. female right shoulder chronic rotator cuff tear    Plan:  The patient's diagnosis and treatment were discussed at length today. We discussed no treatment, non-operative treatment, and operative treatment.    Ama presents today for initial evaluation right shoulder chronic rotator cuff tear.  I discussed with her that given her physical exam of excellent range of motion and limited pain I would not recommend any surgical intervention as well as forego a cortisone injection at today's visit.  She was in agreement with this as she does feel like she is improving with her symptoms.  I did provide her a home exercise program as well as Thera-Band's for strengthening as she would like to no longer attend outpatient physical therapy.  She continue for activity as tolerated using pain as guide.  She continues ice and over-the-counter medication as needed for pain relief.  She is to follow-up with me on an as-needed basis.    Conservative treatment:    Ice to shoulder 1-2 times daily, for 20 minutes at a time.  Home exercise program for shoulder, including ROM and strenthening.  Instructions given to patient of what exercises to perform.  Let pain guide return to activities.      Imaging:    All imaging from today was reviewed by myself and explained to the patient.       Injection:    No Injection planned at this time.      Surgery:     No surgery is recommended at this point, continue with conservative treatment plan as noted.      Follow up:    No follow-ups on file.        Chief Complaint   Patient presents with    Follow-up     Follow up of the mri. Right shoulder.        History of Present Illness:    The patient is a 79 y.o., right hand dominant female whose occupation is retired, referred to me by Mireya Perez PA-C, seen in clinic for consultation of right shoulder pain.  She states that she has been having ongoing right shoulder pain now  for several months without any specific injury or trauma.  She previously saw Dr. Amador and 2021 in which she did home exercise that provided her relief of her symptoms.  She recently saw Dr. Mercado who ordered an MRI that demonstrated rotator cuff pathology.  She states that her pain is predominantly along the lateral aspect of her shoulder and describes it as dull and achy, however she states that it is improving significantly.  She has not attempted any recent outpatient physical therapy or cortisone injections.  She denies any numbness or tingling.    Shoulder Surgical History:  None    Past Medical, Social and Family History:  Past Medical History:   Diagnosis Date    Adrenal abnormality (HCC)     See endocrinologist for care-patient unsure of condition    Change in bowel habit     diarrhea-started end of November 2022    Chronic kidney disease     cyst on right kidney    Colon polyp     Disease of thyroid gland     Dry heaves     hx of    Dysphagia     GERD (gastroesophageal reflux disease)     Hearing loss     wears B/L hearing aides    History of transfusion 1978    Tubal Pregnancy    Hyperlipidemia     Hypertension     Irregular heart beat     last saw Dr. Marshall 2 months ago: denies chest pain, SOB or palpitations per patient  2/22/23    Low back pain     Osteoporosis scoliosis    Skipped heart beats     checked by cardiologist (Dr. Marshall)-was told is no problem October 2021; also had ECHO and stress test    Tubal pregnancy      Past Surgical History:   Procedure Laterality Date    CARPAL TUNNEL RELEASE Bilateral     COLONOSCOPY      ECTOPIC PREGNANCY SURGERY      EGD      EYE SURGERY      Defuction of Right Eye Sac    NEUROPLASTY / TRANSPOSITION MEDIAN NERVE AT CARPAL TUNNEL BILATERAL      OSTEOTOMY TOES Left     2 and 3rd phalanges    OTHER SURGICAL HISTORY      Calcification removed from Right Thumb    SD ARTHRODESIS COMBINED TQ 1NTRSPC LUMBAR N/A 11/01/2021    Procedure: Minimally invasive transverse  lumbar interbody fusion L4-5 from left-sided approach with decompressive laminectomy;  Surgeon: Stanislav Ervin MD;  Location: [facility];  Service: Neurosurgery     No Known Allergies  Current Outpatient Medications on File Prior to Visit   Medication Sig Dispense Refill    acetaminophen (TYLENOL) 325 mg tablet Take 2 tablets (650 mg total) by mouth every 6 (six) hours  0    atorvastatin (LIPITOR) 20 mg tablet Take 20 mg by mouth daily at bedtime       CALCIUM PO Take by mouth      cholestyramine (QUESTRAN) 4 g packet Take 1 packet (4 g total) by mouth in the morning 1 hour separate from other meds 30 packet 2    levothyroxine 25 mcg tablet Take 25 mcg by mouth daily in the early morning       meclizine (ANTIVERT) 25 mg tablet Take 25 mg by mouth every 6 (six) hours as needed      metFORMIN (GLUCOPHAGE-XR) 500 mg 24 hr tablet       metoprolol succinate (TOPROL-XL) 50 mg 24 hr tablet Take 50 mg by mouth daily at bedtime       multivitamin (THERAGRAN) TABS Take 1 tablet by mouth daily      triamcinolone (KENALOG) 0.1 % cream APPLY TO RASH AREA TWICE DAILY ONLY WHEN FLARED APPLY BARRIER OINTMENT ON TOP      VITAMIN D PO Take by mouth      polyethylene glycol (MIRALAX) 17 g packet Take 17 g by mouth daily as needed (constipation) for up to 14 days (Patient not taking: Reported on 2021) 14 each 0     No current facility-administered medications on file prior to visit.     Social History     Socioeconomic History    Marital status: /Civil Union     Spouse name: Not on file    Number of children: Not on file    Years of education: Not on file    Highest education level: Not on file   Occupational History    Not on file   Tobacco Use    Smoking status: Former     Current packs/day: 0.00     Types: Cigarettes     Quit date:      Years since quittin.2    Smokeless tobacco: Never    Tobacco comments:     quit 40 years ago   Vaping Use    Vaping status: Never Used   Substance and Sexual Activity     "Alcohol use: Not Currently    Drug use: Not Currently    Sexual activity: Not on file   Other Topics Concern    Not on file   Social History Narrative    Not on file     Social Determinants of Health     Financial Resource Strain: Not on file   Food Insecurity: Not on file   Transportation Needs: Not on file   Physical Activity: Not on file   Stress: Not on file   Social Connections: Not on file   Intimate Partner Violence: Not on file   Housing Stability: Not on file         I have reviewed the past medical, surgical, social and family history, medications and allergies as documented in the EMR.    Review of systems: ROS is negative other than that noted in the HPI.  Constitutional: Negative for fatigue and fever.   HENT: Negative for sore throat.    Respiratory: Negative for shortness of breath.    Cardiovascular: Negative for chest pain.   Gastrointestinal: Negative for abdominal pain.   Endocrine: Negative for cold intolerance and heat intolerance.   Genitourinary: Negative for flank pain.   Musculoskeletal: Negative for back pain.   Skin: Negative for rash.   Allergic/Immunologic: Negative for immunocompromised state.   Neurological: Negative for dizziness.   Psychiatric/Behavioral: Negative for agitation.      Physical Exam:    Blood pressure 130/88, pulse 80, height 5' 2\" (1.575 m), weight 72.6 kg (160 lb).    General/Constitutional: NAD, well developed, well nourished  HENT: Normocephalic, atraumatic  CV: Intact distal pulses, regular rate  Resp: No respiratory distress or labored breathing  Lymphatic: No lymphadenopathy palpated  Neuro: Alert and Oriented x 3, no focal deficits  Psych: Normal mood, normal affect, normal judgement, normal behavior  Skin: Warm, dry, no rashes, no erythema      Shoulder focused exam:     Visual inspection of the shoulder demonstrates normal contour without atrophy  No evidence of scapular dyskinesia or atrophy.  No scapular winging  Active and passive range of motion " demonstrates forward flexion to 160, abduction to 80,  external rotation is 60 with the arm the side, internal rotation to lower thoracic  Rotator cuff strength is 4/5 to resisted forward flexion, 4/5 resisted abduction, 4/5 resisted external rotation, 4/5 resisted internal rotation  No tenderness to palpation at the distal clavicle, AC joint, acromion, or scapular spine  No pain with cross-body adduction  - Neer's test, - modified De Paz impingement test  Negative external rotation lag sign  Negative belly press, negative lift-off  - speed's and Yergason's test  - tenderness to palpation at the bicipital groove  - Chelan's test        UE NV Exam: +2 Radial pulses bilaterally  Sensation intact to light touch C5-T1 bilaterally, Radial/median/ulnar nerve motor intact      Bilateral elbow, wrist, and and forearm ROM full, painless with passive ROM, no ttp or crepitance throughout extremities below shoulder joint    Cervical ROM is full without pain, numbness or tingling      Shoulder Imaging    X-rays of the right shoulder were reviewed, which demonstrate no acute osseous abnormalities, moderate degenerative changes with evidence of high riding humeral head.  I have reviewed the radiology report and agree with their impression.    MRI of the right shoulder were reviewed, which demonstrate complete insertional tear of supraspinatus and infraspinatus tendon with retraction to level of glenoid.  Moderate supraspinatus and mild infraspinatus muscular atrophy.  There is evidence of severe subscapularis tendinosis without evidence of tear.  Mild glenohumeral and acromioclavicular joint osteoarthritis.  I have reviewed the radiology report and agree with their impression.      Scribe Attestation      I,:  Milan Duarte am acting as a scribe while in the presence of the attending physician.:       I,:  Suraj Lobo, DO personally performed the services described in this documentation    as scribed in my presence.:

## 2024-04-02 ENCOUNTER — TELEPHONE (OUTPATIENT)
Dept: GASTROENTEROLOGY | Facility: CLINIC | Age: 79
End: 2024-04-02

## 2024-04-02 NOTE — TELEPHONE ENCOUNTER
Pt had a lot of pain on left side easter Sunday and is concerned it might be her colon . Can you please contact pt and see if she needs an urgent slot sooner than her appt on 7/2/24. Thanks Marta

## 2024-04-04 NOTE — TELEPHONE ENCOUNTER
Spoke with pt has no complaint at this time, paid subsided. Will keep appt in June. Advised to call back if symptoms return.

## 2024-06-08 ENCOUNTER — APPOINTMENT (EMERGENCY)
Dept: CT IMAGING | Facility: HOSPITAL | Age: 79
End: 2024-06-08
Payer: MEDICARE

## 2024-06-08 ENCOUNTER — HOSPITAL ENCOUNTER (EMERGENCY)
Facility: HOSPITAL | Age: 79
Discharge: HOME | End: 2024-06-08
Attending: EMERGENCY MEDICINE
Payer: MEDICARE

## 2024-06-08 VITALS
HEART RATE: 93 BPM | DIASTOLIC BLOOD PRESSURE: 66 MMHG | OXYGEN SATURATION: 97 % | RESPIRATION RATE: 16 BRPM | SYSTOLIC BLOOD PRESSURE: 126 MMHG | TEMPERATURE: 98.3 F

## 2024-06-08 DIAGNOSIS — R10.9 ACUTE ABDOMINAL PAIN: ICD-10-CM

## 2024-06-08 DIAGNOSIS — R03.0 ELEVATED BLOOD PRESSURE READING: ICD-10-CM

## 2024-06-08 DIAGNOSIS — K63.89 COLONIC MASS: ICD-10-CM

## 2024-06-08 DIAGNOSIS — K57.92 ACUTE DIVERTICULITIS: ICD-10-CM

## 2024-06-08 DIAGNOSIS — R65.10 SIRS (SYSTEMIC INFLAMMATORY RESPONSE SYNDROME) (HCC): Primary | ICD-10-CM

## 2024-06-08 LAB
2HR DELTA HS TROPONIN: 2 NG/L
ALBUMIN SERPL BCG-MCNC: 4.3 G/DL (ref 3.5–5)
ALP SERPL-CCNC: 81 U/L (ref 34–104)
ALT SERPL W P-5'-P-CCNC: 14 U/L (ref 7–52)
ANION GAP SERPL CALCULATED.3IONS-SCNC: 6 MMOL/L (ref 4–13)
APTT PPP: 31 SECONDS (ref 23–37)
AST SERPL W P-5'-P-CCNC: 15 U/L (ref 13–39)
BACTERIA UR QL AUTO: NORMAL /HPF
BASOPHILS # BLD AUTO: 0.05 THOUSANDS/ÂΜL (ref 0–0.1)
BASOPHILS NFR BLD AUTO: 0 % (ref 0–1)
BILIRUB SERPL-MCNC: 0.54 MG/DL (ref 0.2–1)
BILIRUB UR QL STRIP: NEGATIVE
BUN SERPL-MCNC: 13 MG/DL (ref 5–25)
CALCIUM SERPL-MCNC: 9.9 MG/DL (ref 8.4–10.2)
CARDIAC TROPONIN I PNL SERPL HS: 3 NG/L
CARDIAC TROPONIN I PNL SERPL HS: 5 NG/L
CHLORIDE SERPL-SCNC: 102 MMOL/L (ref 96–108)
CLARITY UR: CLEAR
CO2 SERPL-SCNC: 29 MMOL/L (ref 21–32)
COLOR UR: ABNORMAL
CREAT SERPL-MCNC: 0.68 MG/DL (ref 0.6–1.3)
EOSINOPHIL # BLD AUTO: 0.16 THOUSAND/ÂΜL (ref 0–0.61)
EOSINOPHIL NFR BLD AUTO: 1 % (ref 0–6)
ERYTHROCYTE [DISTWIDTH] IN BLOOD BY AUTOMATED COUNT: 12.9 % (ref 11.6–15.1)
GFR SERPL CREATININE-BSD FRML MDRD: 82 ML/MIN/1.73SQ M
GLUCOSE SERPL-MCNC: 69 MG/DL (ref 65–140)
GLUCOSE UR STRIP-MCNC: NEGATIVE MG/DL
HCT VFR BLD AUTO: 43.4 % (ref 34.8–46.1)
HGB BLD-MCNC: 14.1 G/DL (ref 11.5–15.4)
HGB UR QL STRIP.AUTO: ABNORMAL
IMM GRANULOCYTES # BLD AUTO: 0.05 THOUSAND/UL (ref 0–0.2)
IMM GRANULOCYTES NFR BLD AUTO: 0 % (ref 0–2)
INR PPP: 0.94 (ref 0.84–1.19)
KETONES UR STRIP-MCNC: NEGATIVE MG/DL
LACTATE SERPL-SCNC: 1.1 MMOL/L (ref 0.5–2)
LEUKOCYTE ESTERASE UR QL STRIP: ABNORMAL
LYMPHOCYTES # BLD AUTO: 1.69 THOUSANDS/ÂΜL (ref 0.6–4.47)
LYMPHOCYTES NFR BLD AUTO: 13 % (ref 14–44)
MCH RBC QN AUTO: 28.2 PG (ref 26.8–34.3)
MCHC RBC AUTO-ENTMCNC: 32.5 G/DL (ref 31.4–37.4)
MCV RBC AUTO: 87 FL (ref 82–98)
MONOCYTES # BLD AUTO: 1.4 THOUSAND/ÂΜL (ref 0.17–1.22)
MONOCYTES NFR BLD AUTO: 11 % (ref 4–12)
NEUTROPHILS # BLD AUTO: 9.6 THOUSANDS/ÂΜL (ref 1.85–7.62)
NEUTS SEG NFR BLD AUTO: 75 % (ref 43–75)
NITRITE UR QL STRIP: NEGATIVE
NON-SQ EPI CELLS URNS QL MICRO: NORMAL /HPF
NRBC BLD AUTO-RTO: 0 /100 WBCS
PH UR STRIP.AUTO: 6 [PH]
PLATELET # BLD AUTO: 277 THOUSANDS/UL (ref 149–390)
PMV BLD AUTO: 10.1 FL (ref 8.9–12.7)
POTASSIUM SERPL-SCNC: 4.4 MMOL/L (ref 3.5–5.3)
PROCALCITONIN SERPL-MCNC: <0.05 NG/ML
PROT SERPL-MCNC: 7.1 G/DL (ref 6.4–8.4)
PROT UR STRIP-MCNC: NEGATIVE MG/DL
PROTHROMBIN TIME: 13.1 SECONDS (ref 11.6–14.5)
RBC # BLD AUTO: 5 MILLION/UL (ref 3.81–5.12)
RBC #/AREA URNS AUTO: NORMAL /HPF
SODIUM SERPL-SCNC: 137 MMOL/L (ref 135–147)
SP GR UR STRIP.AUTO: 1.01 (ref 1–1.03)
UROBILINOGEN UR STRIP-ACNC: <2 MG/DL
WBC # BLD AUTO: 12.95 THOUSAND/UL (ref 4.31–10.16)
WBC #/AREA URNS AUTO: NORMAL /HPF

## 2024-06-08 PROCEDURE — 83605 ASSAY OF LACTIC ACID: CPT | Performed by: EMERGENCY MEDICINE

## 2024-06-08 PROCEDURE — 85610 PROTHROMBIN TIME: CPT | Performed by: EMERGENCY MEDICINE

## 2024-06-08 PROCEDURE — 81001 URINALYSIS AUTO W/SCOPE: CPT | Performed by: EMERGENCY MEDICINE

## 2024-06-08 PROCEDURE — 74177 CT ABD & PELVIS W/CONTRAST: CPT

## 2024-06-08 PROCEDURE — 36415 COLL VENOUS BLD VENIPUNCTURE: CPT | Performed by: EMERGENCY MEDICINE

## 2024-06-08 PROCEDURE — 99285 EMERGENCY DEPT VISIT HI MDM: CPT | Performed by: EMERGENCY MEDICINE

## 2024-06-08 PROCEDURE — 85730 THROMBOPLASTIN TIME PARTIAL: CPT | Performed by: EMERGENCY MEDICINE

## 2024-06-08 PROCEDURE — 93005 ELECTROCARDIOGRAM TRACING: CPT

## 2024-06-08 PROCEDURE — 96367 TX/PROPH/DG ADDL SEQ IV INF: CPT

## 2024-06-08 PROCEDURE — 87040 BLOOD CULTURE FOR BACTERIA: CPT | Performed by: EMERGENCY MEDICINE

## 2024-06-08 PROCEDURE — 84484 ASSAY OF TROPONIN QUANT: CPT | Performed by: EMERGENCY MEDICINE

## 2024-06-08 PROCEDURE — 96375 TX/PRO/DX INJ NEW DRUG ADDON: CPT

## 2024-06-08 PROCEDURE — 84145 PROCALCITONIN (PCT): CPT | Performed by: EMERGENCY MEDICINE

## 2024-06-08 PROCEDURE — 80053 COMPREHEN METABOLIC PANEL: CPT | Performed by: EMERGENCY MEDICINE

## 2024-06-08 PROCEDURE — 85025 COMPLETE CBC W/AUTO DIFF WBC: CPT | Performed by: EMERGENCY MEDICINE

## 2024-06-08 PROCEDURE — 99284 EMERGENCY DEPT VISIT MOD MDM: CPT

## 2024-06-08 PROCEDURE — 96365 THER/PROPH/DIAG IV INF INIT: CPT

## 2024-06-08 RX ORDER — ACETAMINOPHEN 10 MG/ML
1000 INJECTION, SOLUTION INTRAVENOUS ONCE
Status: COMPLETED | OUTPATIENT
Start: 2024-06-08 | End: 2024-06-08

## 2024-06-08 RX ORDER — AMOXICILLIN AND CLAVULANATE POTASSIUM 875; 125 MG/1; MG/1
1 TABLET, FILM COATED ORAL 3 TIMES DAILY
Qty: 21 TABLET | Refills: 0 | Status: SHIPPED | OUTPATIENT
Start: 2024-06-08 | End: 2024-06-15

## 2024-06-08 RX ORDER — FENTANYL CITRATE 50 UG/ML
25 INJECTION, SOLUTION INTRAMUSCULAR; INTRAVENOUS ONCE
Status: COMPLETED | OUTPATIENT
Start: 2024-06-08 | End: 2024-06-08

## 2024-06-08 RX ADMIN — SODIUM CHLORIDE 500 ML: 0.9 INJECTION, SOLUTION INTRAVENOUS at 15:41

## 2024-06-08 RX ADMIN — AMPICILLIN SODIUM AND SULBACTAM SODIUM 3 G: 100; 50 INJECTION, POWDER, FOR SOLUTION INTRAVENOUS at 18:34

## 2024-06-08 RX ADMIN — IOHEXOL 80 ML: 350 INJECTION, SOLUTION INTRAVENOUS at 18:21

## 2024-06-08 RX ADMIN — ACETAMINOPHEN 1000 MG: 10 INJECTION INTRAVENOUS at 15:42

## 2024-06-08 RX ADMIN — FENTANYL CITRATE 25 MCG: 50 INJECTION INTRAMUSCULAR; INTRAVENOUS at 15:42

## 2024-06-08 NOTE — ED CARE HANDOFF
Emergency Department Sign Out Note        Sign out and transfer of care from Dr. Martin. See Separate Emergency Department note.     The patient, Ama Arteaga, was evaluated by the previous provider for LLQ pain.    Workup Completed:  CBC notable for leukocytosis     ED Course / Workup Pending (followup):  Pending additional labs, UA, and abd/pelvis CT                                  ED Course as of 06/08/24 1934   Sat Jun 08, 2024   1607 S/O: LLQ pain worse with movement, pending labs/CT, UA.   1913 CT shows:    Acute diverticulitis involving the distal descending proximal sigmoid region with no perforation or abscess. Masslike wall thickening seen. Colonoscopy follow-up per GI to exclude malignancy.     Mild apparent bladder wall thickening. UTI in the differential.     1932 Discussed results with patient and family, recommending follow-up with her gastroenterologist for colonoscopy.  She reports last colonoscopy 2 years ago was normal.  Should return to emergency department if she has worsening symptoms.     Procedures  Medical Decision Making  Amount and/or Complexity of Data Reviewed  Labs: ordered.  Radiology: ordered.    Risk  Prescription drug management.            Disposition  Final diagnoses:   SIRS (systemic inflammatory response syndrome) (HCC)   Acute abdominal pain   Elevated blood pressure reading   Acute diverticulitis   Colonic mass     Time reflects when diagnosis was documented in both MDM as applicable and the Disposition within this note       Time User Action Codes Description Comment    6/8/2024  4:09 PM Stephen Martin Add [R65.10] SIRS (systemic inflammatory response syndrome) (HCC)     6/8/2024  4:09 PM Stephen Martin Add [R10.9] Acute abdominal pain     6/8/2024  4:09 PM Stephen Martin Add [R03.0] Elevated blood pressure reading     6/8/2024  7:15 PM Vamsi Alonso Add [K57.92] Acute diverticulitis     6/8/2024  7:15 PM Vamsi Alonso Add [K63.89] Colonic  mass           ED Disposition       ED Disposition   Discharge    Condition   Stable    Date/Time   Sat Jun 8, 2024  7:25 PM    Comment   Ama Arteaga discharge to home/self care.                   Follow-up Information       Follow up With Specialties Details Why Contact Info    Your Gastroenterologist              Patient's Medications   Discharge Prescriptions    AMOXICILLIN-CLAVULANATE (AUGMENTIN) 875-125 MG PER TABLET    Take 1 tablet by mouth 3 (three) times a day for 7 days       Start Date: 6/8/2024  End Date: 6/15/2024       Order Dose: 1 tablet       Quantity: 21 tablet    Refills: 0     No discharge procedures on file.       ED Provider  Electronically Signed by     Vamsi Alonso MD  06/08/24 8799

## 2024-06-08 NOTE — SEPSIS NOTE
Sepsis Note   Ama Arteaga 79 y.o. female MRN: [id number]  Unit/Bed#: ED-24 Encounter: [id number]       Initial Sepsis Screening       Row Name 06/08/24 1612                Is the patient's history suggestive of a new or worsening infection? Yes (Proceed)  -CL        Suspected source of infection acute abdominal infection  -CL        Indicate SIRS criteria Tachycardia > 90 bpm;Leukocytosis (WBC > 19409 IJL) OR Leukopenia (WBC <4000 IJL) OR Bandemia (WBC >10% bands)  -CL        Are two or more of the above signs & symptoms of infection both present and new to the patient? Yes (Proceed)  -CL        Assess for evidence of organ dysfunction: Are any of the below criteria present within 6 hours of suspected infection and SIRS criteria that are NOT considered to be chronic conditions? --                  User Key  (r) = Recorded By, (t) = Taken By, (c) = Cosigned By      Initials Name Provider Type    CL Stephen Martin MD Physician                        There is no height or weight on file to calculate BMI.  Wt Readings from Last 1 Encounters:   04/01/24 72.6 kg (160 lb)        Ideal body weight: 50.1 kg (110 lb 7.2 oz)  Adjusted ideal body weight: 59.1 kg (130 lb 4.3 oz)

## 2024-06-08 NOTE — ED PROVIDER NOTES
History  Chief Complaint   Patient presents with    Abdominal Pain     LLQ pain onset this morning, states did yard work yesterday unsure if pulled something     Patient is an 79-year-old female, with a history significant for CKD, hypertension, hyperlipidemia per my review of medical record, who presents to the ED today for evaluation of atraumatic, nonradiating, constant, waxing waning course, sharp in character, severe in intensity left lower quadrant abdominal pain that patient woke up with this morning.  She states she was in her usual state of health last evening when she went to bed.  There is no associated fever, diarrhea, blood in stool, chest pain, dyspnea, urinary symptoms, weakness, numbness.  Patient has not taken anything to remit her symptoms.  She states that movement and every time she steps down with her left leg her abdomen hurts more.  Patient is without other concerns at this time.        Prior to Admission Medications   Prescriptions Last Dose Informant Patient Reported? Taking?   CALCIUM PO  Self Yes No   Sig: Take by mouth   VITAMIN D PO  Self Yes No   Sig: Take by mouth   acetaminophen (TYLENOL) 325 mg tablet  Self No No   Sig: Take 2 tablets (650 mg total) by mouth every 6 (six) hours   atorvastatin (LIPITOR) 20 mg tablet  Self Yes No   Sig: Take 20 mg by mouth daily at bedtime    cholestyramine (QUESTRAN) 4 g packet  Self No No   Sig: Take 1 packet (4 g total) by mouth in the morning 1 hour separate from other meds   levothyroxine 25 mcg tablet  Self Yes No   Sig: Take 25 mcg by mouth daily in the early morning    meclizine (ANTIVERT) 25 mg tablet   Yes No   Sig: Take 25 mg by mouth every 6 (six) hours as needed   metFORMIN (GLUCOPHAGE-XR) 500 mg 24 hr tablet  Self Yes No   metoprolol succinate (TOPROL-XL) 50 mg 24 hr tablet  Self Yes No   Sig: Take 50 mg by mouth daily at bedtime    multivitamin (THERAGRAN) TABS  Self Yes No   Sig: Take 1 tablet by mouth daily   polyethylene glycol  (MIRALAX) 17 g packet   No No   Sig: Take 17 g by mouth daily as needed (constipation) for up to 14 days   Patient not taking: Reported on 12/16/2021   triamcinolone (KENALOG) 0.1 % cream  Self Yes No   Sig: APPLY TO RASH AREA TWICE DAILY ONLY WHEN FLARED APPLY BARRIER OINTMENT ON TOP      Facility-Administered Medications: None       Past Medical History:   Diagnosis Date    Adrenal abnormality (HCC)     See endocrinologist for care-patient unsure of condition    Change in bowel habit     diarrhea-started end of November 2022    Chronic kidney disease     cyst on right kidney    Colon polyp     Disease of thyroid gland     Dry heaves     hx of    Dysphagia     GERD (gastroesophageal reflux disease)     Hearing loss     wears B/L hearing aides    History of transfusion 1978    Tubal Pregnancy    Hyperlipidemia     Hypertension     Irregular heart beat     last saw Dr. Marshall 2 months ago: denies chest pain, SOB or palpitations per patient  2/22/23    Low back pain     Osteoporosis scoliosis    Skipped heart beats     checked by cardiologist (Dr. Marshall)-was told is no problem October 2021; also had ECHO and stress test    Tubal pregnancy        Past Surgical History:   Procedure Laterality Date    CARPAL TUNNEL RELEASE Bilateral     COLONOSCOPY      ECTOPIC PREGNANCY SURGERY      EGD      EYE SURGERY      Defuction of Right Eye Sac    NEUROPLASTY / TRANSPOSITION MEDIAN NERVE AT CARPAL TUNNEL BILATERAL      OSTEOTOMY TOES Left     2 and 3rd phalanges    OTHER SURGICAL HISTORY      Calcification removed from Right Thumb    CA ARTHRODESIS COMBINED TQ 1NTRSPC LUMBAR N/A 11/01/2021    Procedure: Minimally invasive transverse lumbar interbody fusion L4-5 from left-sided approach with decompressive laminectomy;  Surgeon: Stanislav Ervin MD;  Location: [facility];  Service: Neurosurgery       Family History   Problem Relation Age of Onset    Leukemia Mother     Stomach cancer Father     Aneurysm Brother     Lung cancer Brother      Kidney cancer Brother     COPD Brother     Aneurysm Brother     Anuerysm Brother     Hernia Brother     Lung cancer Brother      I have reviewed and agree with the history as documented.    E-Cigarette/Vaping    E-Cigarette Use Never User      E-Cigarette/Vaping Substances     Social History     Tobacco Use    Smoking status: Former     Current packs/day: 0.00     Types: Cigarettes     Quit date:      Years since quittin.4    Smokeless tobacco: Never    Tobacco comments:     quit 40 years ago   Vaping Use    Vaping status: Never Used   Substance Use Topics    Alcohol use: Not Currently    Drug use: Not Currently       Review of Systems   Constitutional:  Negative for fever.   HENT:  Negative for trouble swallowing.    Eyes:  Negative for visual disturbance.   Respiratory:  Negative for shortness of breath.    Cardiovascular:  Negative for chest pain.   Gastrointestinal:  Positive for abdominal pain. Negative for blood in stool and diarrhea.   Endocrine: Negative for polyuria.   Genitourinary:  Negative for dysuria.   Musculoskeletal:  Negative for gait problem.   Skin:  Negative for rash.   Allergic/Immunologic: Negative for environmental allergies.   Neurological:  Negative for weakness and numbness.   Hematological:  Negative for adenopathy.   Psychiatric/Behavioral:  Negative for confusion.    All other systems reviewed and are negative.      Physical Exam  Physical Exam  Vitals and nursing note reviewed. Exam conducted with a chaperone present.   Constitutional:       General: She is not in acute distress.     Appearance: She is not toxic-appearing.   HENT:      Head: Normocephalic and atraumatic.      Right Ear: External ear normal.      Left Ear: External ear normal.      Nose: Nose normal. No rhinorrhea.      Mouth/Throat:      Mouth: Mucous membranes are moist.      Pharynx: Oropharynx is clear. No oropharyngeal exudate or posterior oropharyngeal erythema.      Comments: Uvula midline. No  oropharyngeal or submandibular mass/swelling  Eyes:      General: No scleral icterus.        Right eye: No discharge.         Left eye: No discharge.      Conjunctiva/sclera: Conjunctivae normal.      Pupils: Pupils are equal, round, and reactive to light.   Neck:      Comments: Patient is spontaneously rotating their neck to the left and right during the history and physical exam interaction without difficulty or apparent discomfort    Cardiovascular:      Rate and Rhythm: Regular rhythm. Tachycardia present.      Pulses: Normal pulses.      Heart sounds: Normal heart sounds. No murmur heard.     No friction rub. No gallop.      Comments: 2+ Radial  Pulmonary:      Effort: Pulmonary effort is normal. No respiratory distress.      Breath sounds: Normal breath sounds. No stridor. No wheezing, rhonchi or rales.   Abdominal:      General: Abdomen is flat. There is no distension.      Palpations: Abdomen is soft.      Tenderness: There is abdominal tenderness. There is guarding. There is no right CVA tenderness, left CVA tenderness or rebound.      Hernia: No hernia is present.   Genitourinary:     Comments: Chaperone present, no hernia in the groin  Musculoskeletal:         General: No deformity.      Cervical back: Neck supple. No tenderness. No muscular tenderness.   Lymphadenopathy:      Cervical: No cervical adenopathy.   Skin:     General: Skin is warm and dry.      Capillary Refill: Capillary refill takes less than 2 seconds.      Findings: No erythema.   Neurological:      Mental Status: She is alert.      Comments: Patient is speaking clearly in complete sentences.  Patient is answering appropriately and able follow commands.  Patient is moving all four extremities spontaneously.  No facial droop.  Tongue midline.      Psychiatric:         Mood and Affect: Mood normal.         Behavior: Behavior normal.         Vital Signs  ED Triage Vitals [06/08/24 1433]   Temperature Pulse Respirations Blood Pressure SpO2    98.3 °F (36.8 °C) 102 16 153/73 97 %      Temp Source Heart Rate Source Patient Position - Orthostatic VS BP Location FiO2 (%)   Oral Monitor Sitting Right arm --      Pain Score       7           Vitals:    06/08/24 1433 06/08/24 1600   BP: 153/73 130/66   Pulse: 102 82   Patient Position - Orthostatic VS: Sitting          Visual Acuity      ED Medications  Medications   ampicillin-sulbactam (UNASYN) 3 g in sodium chloride 0.9 % 100 mL IVPB (has no administration in time range)   fentaNYL injection 25 mcg (25 mcg Intravenous Given 6/8/24 1542)   sodium chloride 0.9 % bolus 500 mL (500 mL Intravenous New Bag 6/8/24 1541)   acetaminophen (Ofirmev) injection 1,000 mg (1,000 mg Intravenous New Bag 6/8/24 1542)       Diagnostic Studies  Results Reviewed       Procedure Component Value Units Date/Time    CBC and differential [[id number]]  (Abnormal) Collected: 06/08/24 1535    Lab Status: Final result Specimen: Blood from Arm, Right Updated: 06/08/24 1556     WBC 12.95 Thousand/uL      RBC 5.00 Million/uL      Hemoglobin 14.1 g/dL      Hematocrit 43.4 %      MCV 87 fL      MCH 28.2 pg      MCHC 32.5 g/dL      RDW 12.9 %      MPV 10.1 fL      Platelets 277 Thousands/uL      nRBC 0 /100 WBCs      Segmented % 75 %      Immature Grans % 0 %      Lymphocytes % 13 %      Monocytes % 11 %      Eosinophils Relative 1 %      Basophils Relative 0 %      Absolute Neutrophils 9.60 Thousands/µL      Absolute Immature Grans 0.05 Thousand/uL      Absolute Lymphocytes 1.69 Thousands/µL      Absolute Monocytes 1.40 Thousand/µL      Eosinophils Absolute 0.16 Thousand/µL      Basophils Absolute 0.05 Thousands/µL     Lactic acid [[id number]] Collected: 06/08/24 1535    Lab Status: In process Specimen: Blood from Arm, Right Updated: 06/08/24 1551    Procalcitonin [[id number]] Collected: 06/08/24 1535    Lab Status: In process Specimen: Blood from Arm, Right Updated: 06/08/24 1551    Comprehensive metabolic panel [[id number]] Collected:  06/08/24 1535    Lab Status: In process Specimen: Blood from Arm, Right Updated: 06/08/24 1551    Protime-INR [[id number]] Collected: 06/08/24 1535    Lab Status: In process Specimen: Blood from Arm, Right Updated: 06/08/24 1551    APTT [[id number]] Collected: 06/08/24 1535    Lab Status: In process Specimen: Blood from Arm, Right Updated: 06/08/24 1551    HS Troponin 0hr (reflex protocol) [[id number]] Collected: 06/08/24 1535    Lab Status: In process Specimen: Blood from Arm, Right Updated: 06/08/24 1551    Blood culture #2 [[id number]] Collected: 06/08/24 1535    Lab Status: In process Specimen: Blood from Arm, Right Updated: 06/08/24 1550    Blood culture #1 [[id number]] Collected: 06/08/24 1541    Lab Status: In process Specimen: Blood from Arm, Left Updated: 06/08/24 1550    UA w Reflex to Microscopic w Reflex to Culture [[id number]]     Lab Status: No result Specimen: Urine                    CT abdomen pelvis with contrast    (Results Pending)              Procedures  Procedures         ED Course  ED Course as of 06/08/24 1613   Sat Jun 08, 2024   1525 ECG per my independent interpretation: Normal rate, regular rhythm, no ectopy, normal axis, no ST elevations or depressions     1605 WBC(!): 12.95  High - Patient meets sirs   1612 Patient signed out prior to final disposition                            Initial Sepsis Screening       Row Name 06/08/24 1612                Is the patient's history suggestive of a new or worsening infection? Yes (Proceed)  -CL        Suspected source of infection acute abdominal infection  -CL        Indicate SIRS criteria Tachycardia > 90 bpm;Leukocytosis (WBC > 83170 IJL) OR Leukopenia (WBC <4000 IJL) OR Bandemia (WBC >10% bands)  -CL        Are two or more of the above signs & symptoms of infection both present and new to the patient? Yes (Proceed)  -CL        Assess for evidence of organ dysfunction: Are any of the below criteria present within 6 hours of suspected infection and  SIRS criteria that are NOT considered to be chronic conditions? --                  User Key  (r) = Recorded By, (t) = Taken By, (c) = Cosigned By      Initials Name Provider Type    CL Stephen Martin MD Physician                                  Medical Decision Making  Patient is an 79-year-old female, with a history significant for CKD, hypertension, hyperlipidemia per my review of medical record, who presents to the ED today for evaluation of atraumatic, nonradiating, constant, waxing waning course, sharp in character, severe in intensity left lower quadrant abdominal pain that patient woke up with this morning.  She states she was in her usual state of health last evening when she went to bed.  There is no associated fever, diarrhea, blood in stool, chest pain, dyspnea, urinary symptoms, weakness, numbness.  Patient has not taken anything to remit her symptoms.  She states that movement and every time she steps down with her left leg her abdomen hurts more.  Patient is currently afebrile, tachycardic, otherwise stable.  Her physical exam is notable for clear heart and lungs, soft abdomen with tenderness and guarding in the left lower quadrant, no hernia.  This presentation is concerning for: Sprain, strain.  Patient at risk for diverticulitis, ACS, UTI, abscess.  Also considered hernia.  Will investigate with sepsis panel order set, cardiac workup, CT abdomen pelvis.  Will manage with multimodal analgesia and further based on workup    Amount and/or Complexity of Data Reviewed  Labs: ordered. Decision-making details documented in ED Course.  Radiology: ordered.    Risk  Prescription drug management.             Disposition  Final diagnoses:   SIRS (systemic inflammatory response syndrome) (HCC)   Acute abdominal pain   Elevated blood pressure reading     Time reflects when diagnosis was documented in both MDM as applicable and the Disposition within this note       Time User Action Codes Description  Comment    6/8/2024  4:09 PM Stephen Martin [R65.10] SIRS (systemic inflammatory response syndrome) (HCC)     6/8/2024  4:09 PM Stephen Martin [R10.9] Acute abdominal pain     6/8/2024  4:09 PM Stephen Martin [R03.0] Elevated blood pressure reading           ED Disposition       None          Follow-up Information    None         Patient's Medications   Discharge Prescriptions    No medications on file       No discharge procedures on file.    PDMP Review         Value Time User    PDMP Reviewed  Yes 11/5/2021 10:32 AM Mundo Davis PA-C            ED Provider  Electronically Signed by             Stephen Martin MD  06/08/24 0552

## 2024-06-09 LAB
ATRIAL RATE: 93 BPM
P AXIS: 50 DEGREES
PR INTERVAL: 140 MS
QRS AXIS: 6 DEGREES
QRSD INTERVAL: 68 MS
QT INTERVAL: 342 MS
QTC INTERVAL: 425 MS
T WAVE AXIS: 4 DEGREES
VENTRICULAR RATE: 93 BPM

## 2024-06-09 PROCEDURE — 93010 ELECTROCARDIOGRAM REPORT: CPT | Performed by: INTERNAL MEDICINE

## 2024-06-11 ENCOUNTER — OFFICE VISIT (OUTPATIENT)
Dept: GASTROENTEROLOGY | Facility: CLINIC | Age: 79
End: 2024-06-11
Payer: MEDICARE

## 2024-06-11 VITALS
WEIGHT: 153.6 LBS | HEART RATE: 94 BPM | DIASTOLIC BLOOD PRESSURE: 74 MMHG | SYSTOLIC BLOOD PRESSURE: 116 MMHG | BODY MASS INDEX: 28.26 KG/M2 | HEIGHT: 62 IN

## 2024-06-11 DIAGNOSIS — R10.32 LLQ PAIN: ICD-10-CM

## 2024-06-11 DIAGNOSIS — Z86.010 HISTORY OF COLON POLYPS: Primary | ICD-10-CM

## 2024-06-11 DIAGNOSIS — K22.2 ESOPHAGEAL RING: ICD-10-CM

## 2024-06-11 PROCEDURE — 99214 OFFICE O/P EST MOD 30 MIN: CPT | Performed by: PHYSICIAN ASSISTANT

## 2024-06-11 RX ORDER — AMOXICILLIN AND CLAVULANATE POTASSIUM 875; 125 MG/1; MG/1
1 TABLET, FILM COATED ORAL EVERY 12 HOURS SCHEDULED
Qty: 6 TABLET | Refills: 0 | Status: SHIPPED | OUTPATIENT
Start: 2024-06-11 | End: 2024-06-14

## 2024-06-11 RX ORDER — ALPRAZOLAM 0.25 MG/1
0.25 TABLET ORAL
COMMUNITY
Start: 2024-06-04

## 2024-06-11 RX ORDER — NYSTATIN AND TRIAMCINOLONE ACETONIDE 100000; 1 [USP'U]/G; MG/G
OINTMENT TOPICAL
COMMUNITY
Start: 2024-06-09

## 2024-06-11 NOTE — PATIENT INSTRUCTIONS
Scheduled date of EGD/colonoscopy (as of today):8/14/2024  Physician performing EGD/colonoscopy:Dr. Rg  Location of EGD/colonoscopy:[facility]  Desired bowel prep reviewed with patient:moreno  Instructions reviewed with patient by:lina  Clearances:  na

## 2024-06-11 NOTE — PROGRESS NOTES
[facility] - Outpatient Follow-up Note  Ama Arteaga 79 y.o. female MRN: [id number]  Encounter: [id number]          ASSESSMENT AND PLAN:      1. LLQ pain  Colonoscopy last year showed multiple small and large, severe extensive diverticula causing moderate luminal narrowing (traversable) in the proximal descending colon, mid descending colon, distal descending colon, proximal sigmoid colon, mid sigmoid colon and distal sigmoid colon, Most recent CT showed acute diverticulitis involving the distal descending proximal sigmoid region with no perforation or abscess. Masslike wall thickening seen. Colonoscopy follow-up per GI to exclude malignancy.  Explained to patient that this likely was representative of diverticulitis and less likely mass, will plan for colonoscopy in about 6 weeks  -Continue antibiotic course but did extended to a 10-day course instead of 7  -Low residue diet for 2 weeks and then slowly increase fiber    2. History of colon polyps  Patient with history of colon adenoma, no further colonoscopies were planned for screening purposes    3. Esophageal ring  -Patient with history of esophageal B ring denies any dysphagia currently  ______________________________________________________________________    SUBJECTIVE:      79-year-old female who presents to follow-up to hospitalization.  Did have CAT scan on 6 8 which did show acute diverticulitis involving the distal descending proximal sigmoid colon without perforation or abscess.  There was masslike wall thickening noted and recommended colonoscopy.  She did have colonoscopy last year and there were multiple small and large severe extensive diverticula causing moderate luminal narrowing which was traversable.  She reports that her abdominal pain has improved but still having bloating symptoms.  She reports that she was eating some low residue foods and previously was doing clear liquid diet.  States that she has had no  issues with constipation has been moving her bowels.  Denies any significant difficulty swallowing, will on a rare occasion but otherwise doing well and did have an EGD with dilatation in the past in 2022.                         CT-  IMPRESSION:     Acute diverticulitis involving the distal descending proximal sigmoid region with no perforation or abscess. Masslike wall thickening seen. Colonoscopy follow-up per GI to exclude malignancy.     Mild apparent bladder wall thickening. UTI in the differential.      Colonoscopy-2/23  IMPRESSION:  1.  Cecal polyp removed.  2.  Extensive sigmoid diverticulosis noted.  There were giant diverticulum's.  3.  Random biopsy of the right and left colon done for evaluation of colitis.  4.  The colonoscopy was done with a upper endoscope    Path-  A. Large Intestine, Cecum, cold bx Cecum polypsx2:  - Tubular adenoma.  - Prominent lymphoid aggregates.  - Negative for high grade dysplasia/ carcinoma.     B. Large Intestine, Right/Ascending Colon, cold biopsy ascending random:  - Fragments of colonic mucosa without significant histopathologic changes.  - There is no increase in the number of intraepithelial lymphocytes or thickened basement membranes to suggest the presence of microscopic colitis.      C. Large Intestine, Sigmoid Colon, cold biopsy sigmoid random:  - Fragments of colonic mucosa without significant histopathologic changes.  - There is no increase in the number of intraepithelial lymphocytes or thickened basement membranes to suggest the presence of microscopic colitis.    EGD-2022  IMPRESSION:  1.  Lower esophageal B ring noted.  2. Definite hiatal hernia not seen.  3. Balloon dilatation done of the lower esophageal B ring to [address] [place].       REVIEW OF SYSTEMS IS OTHERWISE NEGATIVE.      Historical Information   Past Medical History:   Diagnosis Date    Adrenal abnormality (HCC)     See endocrinologist for care-patient unsure of condition    Change in bowel habit      diarrhea-started end of 2022    Chronic kidney disease     cyst on right kidney    Colon polyp     Disease of thyroid gland     Dry heaves     hx of    Dysphagia     GERD (gastroesophageal reflux disease)     Hearing loss     wears B/L hearing aides    History of transfusion     Tubal Pregnancy    Hyperlipidemia     Hypertension     Irregular heart beat     last saw Dr. Marshall 2 months ago: denies chest pain, SOB or palpitations per patient  23    Low back pain     Osteoporosis scoliosis    Skipped heart beats     checked by cardiologist (Dr. Marshall)-was told is no problem 2021; also had ECHO and stress test    Tubal pregnancy      Past Surgical History:   Procedure Laterality Date    CARPAL TUNNEL RELEASE Bilateral     COLONOSCOPY      ECTOPIC PREGNANCY SURGERY      EGD      EYE SURGERY      Defuction of Right Eye Sac    NEUROPLASTY / TRANSPOSITION MEDIAN NERVE AT CARPAL TUNNEL BILATERAL      OSTEOTOMY TOES Left     2 and 3rd phalanges    OTHER SURGICAL HISTORY      Calcification removed from Right Thumb    KY ARTHRODESIS COMBINED TQ 1NTRSPC LUMBAR N/A 2021    Procedure: Minimally invasive transverse lumbar interbody fusion L4-5 from left-sided approach with decompressive laminectomy;  Surgeon: Stanislav Ervin MD;  Location: [facility];  Service: Neurosurgery     Social History   Social History     Substance and Sexual Activity   Alcohol Use Not Currently     Social History     Substance and Sexual Activity   Drug Use Not Currently     Social History     Tobacco Use   Smoking Status Former    Current packs/day: 0.00    Types: Cigarettes    Quit date:     Years since quittin.4   Smokeless Tobacco Never   Tobacco Comments    quit 40 years ago     Family History   Problem Relation Age of Onset    Leukemia Mother     Stomach cancer Father     Aneurysm Brother     Lung cancer Brother     Kidney cancer Brother     COPD Brother     Aneurysm Brother     Anuerysm Brother     Hernia  Brother     Lung cancer Brother        Meds/Allergies       Current Outpatient Medications:     acetaminophen (TYLENOL) 325 mg tablet    amoxicillin-clavulanate (AUGMENTIN) 875-125 mg per tablet    atorvastatin (LIPITOR) 20 mg tablet    CALCIUM PO    cholestyramine (QUESTRAN) 4 g packet    levothyroxine 25 mcg tablet    meclizine (ANTIVERT) 25 mg tablet    metFORMIN (GLUCOPHAGE-XR) 500 mg 24 hr tablet    metoprolol succinate (TOPROL-XL) 50 mg 24 hr tablet    multivitamin (THERAGRAN) TABS    polyethylene glycol (MIRALAX) 17 g packet    triamcinolone (KENALOG) 0.1 % cream    VITAMIN D PO    No Known Allergies        Objective     There were no vitals taken for this visit. There is no height or weight on file to calculate BMI.      PHYSICAL EXAM:      General Appearance:   Alert, cooperative, no distress   HEENT:   Normocephalic, atraumatic, anicteric.     Neck:  Supple, symmetrical, trachea midline   Lungs:   Clear to auscultation bilaterally; no rales, rhonchi or wheezing; respirations unlabored    Heart::   Regular rate and rhythm; no murmur, rub, or gallop.   Abdomen:   Soft, non-tender, non-distended; normal bowel sounds; no masses, no organomegaly    Genitalia:   Deferred    Rectal:   Deferred    Extremities:  No cyanosis, clubbing or edema    Pulses:  2+ and symmetric    Skin:  No jaundice, rashes, or lesions    Lymph nodes:  No palpable cervical lymphadenopathy        Lab Results:   No visits with results within 1 Day(s) from this visit.   Latest known visit with results is:   Admission on 06/08/2024, Discharged on 06/08/2024   Component Date Value    WBC 06/08/2024 12.95 (H)     RBC 06/08/2024 5.00     Hemoglobin 06/08/2024 14.1     Hematocrit 06/08/2024 43.4     MCV 06/08/2024 87     MCH 06/08/2024 28.2     MCHC 06/08/2024 32.5     RDW 06/08/2024 12.9     MPV 06/08/2024 10.1     Platelets 06/08/2024 277     nRBC 06/08/2024 0     Segmented % 06/08/2024 75     Immature Grans % 06/08/2024 0     Lymphocytes %  06/08/2024 13 (L)     Monocytes % 06/08/2024 11     Eosinophils Relative 06/08/2024 1     Basophils Relative 06/08/2024 0     Absolute Neutrophils 06/08/2024 9.60 (H)     Absolute Immature Grans 06/08/2024 0.05     Absolute Lymphocytes 06/08/2024 1.69     Absolute Monocytes 06/08/2024 1.40 (H)     Eosinophils Absolute 06/08/2024 0.16     Basophils Absolute 06/08/2024 0.05     Sodium 06/08/2024 137     Potassium 06/08/2024 4.4     Chloride 06/08/2024 102     CO2 06/08/2024 29     ANION GAP 06/08/2024 6     BUN 06/08/2024 13     Creatinine 06/08/2024 0.68     Glucose 06/08/2024 69     Calcium 06/08/2024 9.9     AST 06/08/2024 15     ALT 06/08/2024 14     Alkaline Phosphatase 06/08/2024 81     Total Protein 06/08/2024 7.1     Albumin 06/08/2024 4.3     Total Bilirubin 06/08/2024 0.54     eGFR 06/08/2024 82     LACTIC ACID 06/08/2024 1.1     Procalcitonin 06/08/2024 <0.05     Protime 06/08/2024 13.1     INR 06/08/2024 0.94     PTT 06/08/2024 31     Blood Culture 06/08/2024 No Growth at 48 hrs.     Blood Culture 06/08/2024 No Growth at 48 hrs.     Color, UA 06/08/2024 Light Yellow     Clarity, UA 06/08/2024 Clear     Specific Gravity, UA 06/08/2024 1.010     pH, UA 06/08/2024 6.0     Leukocytes, UA 06/08/2024 Small (A)     Nitrite, UA 06/08/2024 Negative     Protein, UA 06/08/2024 Negative     Glucose, UA 06/08/2024 Negative     Ketones, UA 06/08/2024 Negative     Urobilinogen, UA 06/08/2024 <2.0     Bilirubin, UA 06/08/2024 Negative     Occult Blood, UA 06/08/2024 Trace (A)     hs TnI 0hr 06/08/2024 3     Ventricular Rate 06/08/2024 93     Atrial Rate 06/08/2024 93     IL Interval 06/08/2024 140     QRSD Interval 06/08/2024 68     QT Interval 06/08/2024 342     QTC Interval 06/08/2024 425     P Axis 06/08/2024 50     QRS Axis 06/08/2024 6     T Wave [place] 06/08/2024 4     hs TnI 2hr 06/08/2024 5     Delta 2hr hsTnI 06/08/2024 2     RBC, UA 06/08/2024 1-2     WBC, UA 06/08/2024 1-2     Epithelial Cells 06/08/2024  Occasional     Bacteria, UA 06/08/2024 Occasional          Radiology Results:   CT abdomen pelvis with contrast    Result Date: 6/8/2024  Narrative: CT ABDOMEN AND PELVIS WITH IV CONTRAST INDICATION: LLQ pain. COMPARISON: Compared with 12/10/2022. TECHNIQUE: CT examination of the abdomen and pelvis was performed. Multiplanar 2D reformatted images were created from the source data. This examination, like all CT scans performed in the [facility], was performed utilizing techniques to minimize radiation dose exposure, including the use of iterative reconstruction and automated exposure control. Radiation dose length product (DLP) for this visit: 786 mGy-cm IV Contrast: 80 mL of iohexol (OMNIPAQUE) Enteric Contrast: Not administered. FINDINGS: ABDOMEN LOWER CHEST: No clinically significant abnormality in the visualized lower chest. LIVER/BILIARY TREE: Unremarkable. GALLBLADDER: No calcified gallstones. No pericholecystic inflammatory change. SPLEEN: Unremarkable. PANCREAS: Unremarkable. ADRENAL GLANDS: Unremarkable. KIDNEYS/URETERS: Both kidneys enhance symmetrically with contrast. Parapelvic cysts in the left kidney. Right kidney lower pole exophytic simple cyst.. No hydronephrosis. STOMACH AND BOWEL: Distal descending and proximal sigmoid region demonstrates masslike focal area of thickening with surrounding inflammatory changes. Diffuse diverticulosis. No perforation or abscess formation.. APPENDIX: No findings to suggest appendicitis. ABDOMINOPELVIC CAVITY: No ascites. No pneumoperitoneum. No lymphadenopathy. VESSELS: Unremarkable for patient's age. PELVIS REPRODUCTIVE ORGANS: Unremarkable for patient's age. Prominent parametrial vessels with prominent gonadal veins. URINARY BLADDER: With mild apparent bladder wall thickening.. ABDOMINAL WALL/INGUINAL REGIONS: Unremarkable. BONES: Pedicle screws at L4-L5 with intervertebral body disc cage. Multilevel degenerative disc changes no acute fracture or  "suspicious osseous lesion.     Impression: Acute diverticulitis involving the distal descending proximal sigmoid region with no perforation or abscess. Masslike wall thickening seen. Colonoscopy follow-up per GI to exclude malignancy. Mild apparent bladder wall thickening. UTI in the differential. Workstation performed: [id number]     DXA BONE DENSITY SCAN STANDARD 2 SITES    Result Date: 2024  Narrative: A DXA bone mineral density examination was performed with a Hologic scanner. The patient is a 79 y.o. female with a history of osteopenia multiple sites.   Previous Study Comparison: 2021     The left hip was examined in several regions. In total, the bone mineral density is 0.1 standard deviations above the predicted peak bone mass and is 101% of normal. The total hip measurement is 0.950 g/cm2.   The femoral neck measurement is 1.3 standard deviations below the predicted peak bone mass and is 83% of normal. The femoral neck measurement is 0.703 g/cm2.   The left forearm was examined. The one-third radius has bone mineral density that is 2.2 standard deviations below the predicted peak bone mass and is within 81% of normal.   WHO Fracture Risk Assessment Tool (FRAX) estimates 10 year fracture risk as follows: * Major osteoporotic fracture risk without prior fracture is 13 %. * Major osteoporotic fracture risk with prior fracture is 18 %. * Hip fracture risk without prior fracture is 2.9 %. * Hip fracture risk with prior fracture is 3.8 %.   The National Osteoporosis Foundation recommends that FDA approved medical therapies be considered in postmenopausal women and men age greater than or equal to 50 years with the followin. Hip or vertebral fracture; 2. T score of less than or equal to  2.5 at the spine or hip; 3. Osteopenia and either a 10 year fracture probability by FRAX of greater than or equal to 20% for major osteoporotic fracture or equal to 3% for hip fracture.\"   Comment: All treatment " decisions, including pharmacologic treatment, require clinical judgment and consideration of individual patient factors, including patient preferences, comorbidities, previous drug use and risk factors not captured in the FRAX model, e.g. fragility, falls, vitamin D deficiency, increased bone turnover, and interval significant decline in bone mineral density.    Impression: IMPRESSION: 1. Osteopenia of the left femoral neck and distal third left forearm corresponding to increased fracture risk. Bone mineral density is increased in the left femoral neck.   Workstation:[id number]    Mammo screening bilateral w 3d & cad    Result Date: 5/29/2024  Narrative: This result has an attachment that is not available. HISTORY: Patient is 79 years old and is seen for a screening mammogram of both breasts. No relevant medical history has been documented for this patient. No relevant surgical history has been documented for this patient. No relevant family history has been documented for this patient. No relevant hormone history has been documented for this patient. FILMS COMPARED: The present examination has been compared to prior imaging studies. MAMMOGRAM FINDINGS: A minimum of two views were obtained. 3D tomosynthesis was performed. Computer-aided detection was utilized by the radiologist in the interpretation of this examination. The breasts have scattered areas of fibroglandular density. No suspicious masses, calcifications or other abnormalities are seen.    Impression: Impression: There is no mammographic evidence of malignancy. BI-RADS Category:  1 - Negative Follow Up Mamm in 1 Yr. is recommended. 5 Year Tyrer-Cuzick 8: No Score 10 Year Tyrer-Cuzick 8: No Score Lifetime Tyrer-Cuzick 8: 0.46% In patients with a documented history of breast cancer, male patients, patients legal sex is unknown or with an age less than 19 or 85 and greater a risk score will not be calculated. Based on the information provided by the patient,  risk scores for breast cancer for 5 years, 10 years and lifetime was calculated using both breast density and family history.  Patients should consult with their referring providers regarding the significance of the score, potential need for additional risk assessment and supplemental screening including whole breast ultrasound and MRI Workstation: [id number]

## 2024-06-14 LAB
BACTERIA BLD CULT: NORMAL
BACTERIA BLD CULT: NORMAL

## 2024-08-12 ENCOUNTER — HOSPITAL ENCOUNTER (EMERGENCY)
Facility: HOSPITAL | Age: 79
Discharge: HOME | End: 2024-08-12
Attending: EMERGENCY MEDICINE | Admitting: EMERGENCY MEDICINE
Payer: MEDICARE

## 2024-08-12 ENCOUNTER — TELEPHONE (OUTPATIENT)
Age: 79
End: 2024-08-12

## 2024-08-12 VITALS
SYSTOLIC BLOOD PRESSURE: 104 MMHG | OXYGEN SATURATION: 93 % | BODY MASS INDEX: 28.68 KG/M2 | HEIGHT: 62 IN | RESPIRATION RATE: 16 BRPM | WEIGHT: 155.87 LBS | HEART RATE: 80 BPM | TEMPERATURE: 98.6 F | DIASTOLIC BLOOD PRESSURE: 59 MMHG

## 2024-08-12 DIAGNOSIS — N30.91 HEMORRHAGIC CYSTITIS: Primary | ICD-10-CM

## 2024-08-12 LAB
ANION GAP SERPL CALCULATED.3IONS-SCNC: 9 MMOL/L (ref 4–13)
BACTERIA UR QL AUTO: ABNORMAL /HPF
BASOPHILS # BLD AUTO: 0.06 THOUSANDS/ÂΜL (ref 0–0.1)
BASOPHILS NFR BLD AUTO: 1 % (ref 0–1)
BILIRUB UR QL STRIP: NEGATIVE
BUN SERPL-MCNC: 13 MG/DL (ref 5–25)
CALCIUM SERPL-MCNC: 9.9 MG/DL (ref 8.4–10.2)
CHLORIDE SERPL-SCNC: 101 MMOL/L (ref 96–108)
CLARITY UR: ABNORMAL
CO2 SERPL-SCNC: 27 MMOL/L (ref 21–32)
COLOR UR: ABNORMAL
CREAT SERPL-MCNC: 0.76 MG/DL (ref 0.6–1.3)
EOSINOPHIL # BLD AUTO: 0.15 THOUSAND/ÂΜL (ref 0–0.61)
EOSINOPHIL NFR BLD AUTO: 1 % (ref 0–6)
ERYTHROCYTE [DISTWIDTH] IN BLOOD BY AUTOMATED COUNT: 12.8 % (ref 11.6–15.1)
GFR SERPL CREATININE-BSD FRML MDRD: 73 ML/MIN/1.73SQ M
GLUCOSE SERPL-MCNC: 112 MG/DL (ref 65–140)
GLUCOSE UR STRIP-MCNC: NEGATIVE MG/DL
HCT VFR BLD AUTO: 43.5 % (ref 34.8–46.1)
HGB BLD-MCNC: 14.2 G/DL (ref 11.5–15.4)
HGB UR QL STRIP.AUTO: ABNORMAL
IMM GRANULOCYTES # BLD AUTO: 0.09 THOUSAND/UL (ref 0–0.2)
IMM GRANULOCYTES NFR BLD AUTO: 1 % (ref 0–2)
KETONES UR STRIP-MCNC: NEGATIVE MG/DL
LEUKOCYTE ESTERASE UR QL STRIP: ABNORMAL
LYMPHOCYTES # BLD AUTO: 1.74 THOUSANDS/ÂΜL (ref 0.6–4.47)
LYMPHOCYTES NFR BLD AUTO: 14 % (ref 14–44)
MCH RBC QN AUTO: 28 PG (ref 26.8–34.3)
MCHC RBC AUTO-ENTMCNC: 32.6 G/DL (ref 31.4–37.4)
MCV RBC AUTO: 86 FL (ref 82–98)
MONOCYTES # BLD AUTO: 1.03 THOUSAND/ÂΜL (ref 0.17–1.22)
MONOCYTES NFR BLD AUTO: 8 % (ref 4–12)
NEUTROPHILS # BLD AUTO: 9.56 THOUSANDS/ÂΜL (ref 1.85–7.62)
NEUTS SEG NFR BLD AUTO: 75 % (ref 43–75)
NITRITE UR QL STRIP: NEGATIVE
NON-SQ EPI CELLS URNS QL MICRO: ABNORMAL /HPF
NRBC BLD AUTO-RTO: 0 /100 WBCS
PH UR STRIP.AUTO: 6 [PH]
PLATELET # BLD AUTO: 265 THOUSANDS/UL (ref 149–390)
PMV BLD AUTO: 10.4 FL (ref 8.9–12.7)
POTASSIUM SERPL-SCNC: 4 MMOL/L (ref 3.5–5.3)
PROT UR STRIP-MCNC: ABNORMAL MG/DL
RBC # BLD AUTO: 5.08 MILLION/UL (ref 3.81–5.12)
RBC #/AREA URNS AUTO: ABNORMAL /HPF
RENAL EPI CELLS #/AREA URNS HPF: PRESENT /[HPF]
SODIUM SERPL-SCNC: 137 MMOL/L (ref 135–147)
SP GR UR STRIP.AUTO: 1.02 (ref 1–1.03)
UROBILINOGEN UR STRIP-ACNC: <2 MG/DL
WBC # BLD AUTO: 12.63 THOUSAND/UL (ref 4.31–10.16)
WBC #/AREA URNS AUTO: ABNORMAL /HPF

## 2024-08-12 PROCEDURE — 99283 EMERGENCY DEPT VISIT LOW MDM: CPT

## 2024-08-12 PROCEDURE — 80048 BASIC METABOLIC PNL TOTAL CA: CPT | Performed by: EMERGENCY MEDICINE

## 2024-08-12 PROCEDURE — 87086 URINE CULTURE/COLONY COUNT: CPT | Performed by: EMERGENCY MEDICINE

## 2024-08-12 PROCEDURE — 85025 COMPLETE CBC W/AUTO DIFF WBC: CPT | Performed by: EMERGENCY MEDICINE

## 2024-08-12 PROCEDURE — 99284 EMERGENCY DEPT VISIT MOD MDM: CPT | Performed by: EMERGENCY MEDICINE

## 2024-08-12 PROCEDURE — 81001 URINALYSIS AUTO W/SCOPE: CPT | Performed by: EMERGENCY MEDICINE

## 2024-08-12 PROCEDURE — 87186 SC STD MICRODIL/AGAR DIL: CPT | Performed by: EMERGENCY MEDICINE

## 2024-08-12 PROCEDURE — 87077 CULTURE AEROBIC IDENTIFY: CPT | Performed by: EMERGENCY MEDICINE

## 2024-08-12 PROCEDURE — 36415 COLL VENOUS BLD VENIPUNCTURE: CPT | Performed by: EMERGENCY MEDICINE

## 2024-08-12 RX ORDER — CEFUROXIME AXETIL 250 MG/1
250 TABLET ORAL
COMMUNITY
Start: 2024-08-11

## 2024-08-12 NOTE — TELEPHONE ENCOUNTER
New Patient    What is the reason for the patient’s appointment? NP- ER f/u for hemorrhage cystitis. Per ER recommendations needs to be seen within 3 weeks. I gave her the 1st available on 9/26. Please triage.    CB: [phone]     What office location does the patient prefer? [facility]    Does patient have Imaging/Lab Results:  In Epic    Have patient records been requested?  If No, are the records showing in Epic:   In Epic    INSURANCE:   Do we accept the patient's insurance or is the patient Self-Pay?   [employer]     HISTORY:   Has the patient had any previous Urologist(s)? No    Was the patient seen in the ED? Yes    Has the patient had any outside testing done? No    Does the patient have a personal history of cancer? No

## 2024-08-12 NOTE — DISCHARGE INSTRUCTIONS
Diagnosis; HEMORRHAGIC CYSTITIS     - PLEASE FINISH THE ANTIBIOTIC CEFTIN 1 TABLET 2 TIMES A DAY FOR 7 DAYS    - THE BLOOD SHOULD DECREASE OVER THE NEXT WEEK     - PLEASE RETURN TO THE ER FOR ANY FEVERS- TEMP > 100.4/ ANY FLANK PAIN/ ANY VOMITING - OR ANY SIGNS OF URINARY RETENTION -- PASSING CLOTS WITH DECREASING AMOUNT OF URINATION/SENSE OF  NOT BEING MARLENE TO FULLY EMPTY YOUR BLADDER WHEN URINATING WITH INCREASING LOWER ABDOMINAL PRESSURE/DISTENTION -   OR ANY INCREASING AMOUNT OF BLOOD IN URINE NOT STOPPING WITH THE INABILITY TO SEE THE UROLOGIST    - YOU WILL NEED FOLLOW UP FOR THIS - PLEASE CALL THE UROLOGY OFFICE TO SCHEDULE AN APPOINTMENT- YOU HAVE A REFERRAL IN THE COMPUTER- THIS CAN BE WITHIN THE NEXT SEVERAL WEEKS

## 2024-08-12 NOTE — ED PROVIDER NOTES
History  Chief Complaint   Patient presents with    Blood in Urine     Pt presents to the ED with a known UTI, started urinating blood this am.     79 yr female  over weekend developed dysuria/ with frequency - with no other comps- went to urgent care dx with uti -placed on cxeftin for 10 days- this am with painless gross hematuia- no fevers- no n/v- no abd/flank pain - normal bm's -  no other comps- no trauma-       History provided by:  Patient  Blood in Urine  This is a new problem. The current episode started today. Irritative symptoms include frequency. Irritative symptoms do not include urgency. Associated symptoms include dysuria. Pertinent negatives include no flank pain.       Prior to Admission Medications   Prescriptions Last Dose Informant Patient Reported? Taking?   ALPRAZolam (XANAX) 0.25 mg tablet  Self Yes No   Sig: Take 0.25 mg by mouth daily at bedtime as needed   CALCIUM PO  Self Yes No   Sig: Take by mouth   VITAMIN D PO  Self Yes No   Sig: Take by mouth   acetaminophen (TYLENOL) 325 mg tablet  Self No No   Sig: Take 2 tablets (650 mg total) by mouth every 6 (six) hours   atorvastatin (LIPITOR) 20 mg tablet  Self Yes No   Sig: Take 20 mg by mouth daily at bedtime    cefuroxime (CEFTIN) 250 mg tablet   Yes Yes   Sig: Take 250 mg by mouth   cholestyramine (QUESTRAN) 4 g packet  Self No No   Sig: Take 1 packet (4 g total) by mouth in the morning 1 hour separate from other meds   levothyroxine 25 mcg tablet  Self Yes No   Sig: Take 25 mcg by mouth daily in the early morning    meclizine (ANTIVERT) 25 mg tablet  Self Yes No   Sig: Take 25 mg by mouth every 6 (six) hours as needed   metFORMIN (GLUCOPHAGE-XR) 500 mg 24 hr tablet  Self Yes No   metoprolol succinate (TOPROL-XL) 50 mg 24 hr tablet  Self Yes No   Sig: Take 50 mg by mouth daily at bedtime    multivitamin (THERAGRAN) TABS  Self Yes No   Sig: Take 1 tablet by mouth daily   nystatin-triamcinolone (MYCOLOG-II) ointment  Self Yes No   Sig: APPLY  TO AFFECTED AREA S) TWO TIMES A DAY   polyethylene glycol (GOLYTELY) 4000 mL solution   No No   Sig: Take 4,000 mL by mouth once for 1 dose   triamcinolone (KENALOG) 0.1 % cream  Self Yes No   Sig: APPLY TO RASH AREA TWICE DAILY ONLY WHEN FLARED APPLY BARRIER OINTMENT ON TOP      Facility-Administered Medications: None       Past Medical History:   Diagnosis Date    Adrenal abnormality (HCC)     See endocrinologist for care-patient unsure of condition    Change in bowel habit     diarrhea-started end of November 2022    Chronic kidney disease     cyst on right kidney    Colon polyp     Disease of thyroid gland     Diverticulitis     Dry heaves     hx of    Dysphagia     GERD (gastroesophageal reflux disease)     Hearing loss     wears B/L hearing aides    History of transfusion 1978    Tubal Pregnancy    Hyperlipidemia     Hypertension     Irregular heart beat     last saw Dr. Marshall 2 months ago: denies chest pain, SOB or palpitations per patient  2/22/23    Low back pain     Osteoporosis scoliosis    Skipped heart beats     checked by cardiologist (Dr. Marshall)-was told is no problem October 2021; also had ECHO and stress test    Tubal pregnancy        Past Surgical History:   Procedure Laterality Date    CARPAL TUNNEL RELEASE Bilateral     COLONOSCOPY      ECTOPIC PREGNANCY SURGERY      EGD      EYE SURGERY      Defuction of Right Eye Sac    NEUROPLASTY / TRANSPOSITION MEDIAN NERVE AT CARPAL TUNNEL BILATERAL      OSTEOTOMY TOES Left     2 and 3rd phalanges    OTHER SURGICAL HISTORY      Calcification removed from Right Thumb    LA ARTHRODESIS COMBINED TQ 1NTRSPC LUMBAR N/A 11/01/2021    Procedure: Minimally invasive transverse lumbar interbody fusion L4-5 from left-sided approach with decompressive laminectomy;  Surgeon: Stanislav Ervin MD;  Location: [facility];  Service: Neurosurgery       Family History   Problem Relation Age of Onset    Leukemia Mother     Stomach cancer Father     Aneurysm Brother     Lung  cancer Brother     Kidney cancer Brother     COPD Brother     Aneurysm Brother     Anuerysm Brother     Hernia Brother     Lung cancer Brother      I have reviewed and agree with the history as documented.    E-Cigarette/Vaping    E-Cigarette Use Never User      E-Cigarette/Vaping Substances     Social History     Tobacco Use    Smoking status: Former     Current packs/day: 0.00     Types: Cigarettes     Quit date:      Years since quittin.6    Smokeless tobacco: Never    Tobacco comments:     quit 40 years ago   Vaping Use    Vaping status: Never Used   Substance Use Topics    Alcohol use: Not Currently    Drug use: Not Currently       Review of Systems   Constitutional: Negative.    HENT: Negative.     Eyes: Negative.    Respiratory: Negative.     Cardiovascular: Negative.    Gastrointestinal: Negative.    Endocrine: Negative.    Genitourinary:  Positive for dysuria, frequency and hematuria. Negative for decreased urine volume, difficulty urinating, dyspareunia, enuresis, flank pain, genital sores, menstrual problem, pelvic pain, urgency, vaginal bleeding, vaginal discharge and vaginal pain.   Musculoskeletal: Negative.    Skin: Negative.    Allergic/Immunologic: Negative.    Neurological: Negative.    Hematological: Negative.    Psychiatric/Behavioral: Negative.         Physical Exam  Physical Exam  Vitals and nursing note reviewed.   Constitutional:       General: She is not in acute distress.     Appearance: Normal appearance. She is not ill-appearing, toxic-appearing or diaphoretic.      Comments: Avss- htnsive- which resolved in er - pulse ox  95 % on ra- interpretation is normal- no intervention    HENT:      Head: Normocephalic and atraumatic.      Nose: Nose normal.      Mouth/Throat:      Mouth: Mucous membranes are moist.   Eyes:      General: No scleral icterus.        Right eye: No discharge.         Left eye: No discharge.      Extraocular Movements: Extraocular movements intact.       Conjunctiva/sclera: Conjunctivae normal.      Pupils: Pupils are equal, round, and reactive to light.      Comments: Mm pink   Neck:      Vascular: No carotid bruit.      Comments: No pmt c/t/l/s spine   Cardiovascular:      Rate and Rhythm: Normal rate and regular rhythm.      Pulses: Normal pulses.      Heart sounds: Normal heart sounds. No murmur heard.     No friction rub. No gallop.   Pulmonary:      Effort: Pulmonary effort is normal. No respiratory distress.      Breath sounds: Normal breath sounds. No stridor. No wheezing, rhonchi or rales.   Chest:      Chest wall: No tenderness.   Abdominal:      General: Bowel sounds are normal. There is no distension.      Palpations: Abdomen is soft. There is no mass.      Tenderness: There is no abdominal tenderness. There is no right CVA tenderness, left CVA tenderness, guarding or rebound.      Hernia: No hernia is present.      Comments: Soft nt/nd- no hsm- no cva tenderness- no ascites- no peritoneal signs- no pulsatile abd mass/bruit/ tenderness-    Musculoskeletal:         General: No swelling, tenderness, deformity or signs of injury. Normal range of motion.      Cervical back: Normal range of motion and neck supple. No rigidity or tenderness.      Right lower leg: No edema.      Left lower leg: No edema.      Comments: Equal bilateral radial/dp pulses- no ble edema/calf tenderness/asym/ erythema    Lymphadenopathy:      Cervical: No cervical adenopathy.   Skin:     General: Skin is warm.      Capillary Refill: Capillary refill takes less than 2 seconds.      Coloration: Skin is not jaundiced or pale.      Findings: No bruising, erythema, lesion or rash.   Neurological:      General: No focal deficit present.      Mental Status: She is alert and oriented to person, place, and time. Mental status is at baseline.      Cranial Nerves: No cranial nerve deficit.      Sensory: No sensory deficit.      Motor: No weakness.      Coordination: Coordination normal.       Gait: Gait normal.      Comments: Normal non focal neuro exam - normal gait in er    Psychiatric:         Behavior: Behavior normal.      Comments: Anxious appearing-          Vital Signs  ED Triage Vitals   Temperature Pulse Respirations Blood Pressure SpO2   08/12/24 0801 08/12/24 0801 08/12/24 0801 08/12/24 0801 08/12/24 0801   98.6 °F (37 °C) 105 16 (!) 190/97 96 %      Temp Source Heart Rate Source Patient Position - Orthostatic VS BP Location FiO2 (%)   08/12/24 0801 08/12/24 0801 08/12/24 0832 08/12/24 0801 --   Oral Monitor Sitting Right arm       Pain Score       08/12/24 0801       No Pain           Vitals:    08/12/24 0801 08/12/24 0832   BP: (!) 190/97 127/69   Pulse: 105    Patient Position - Orthostatic VS:  Sitting         Visual Acuity      ED Medications  Medications - No data to display    Diagnostic Studies  Results Reviewed       Procedure Component Value Units Date/Time    CBC and differential [[id number]] Collected: 08/12/24 0828    Lab Status: In process Specimen: Blood from Arm, Right Updated: 08/12/24 0842    Basic metabolic panel [[id number]] Collected: 08/12/24 0828    Lab Status: In process Specimen: Blood from Arm, Right Updated: 08/12/24 0842    UA (URINE) with reflex to Scope [[id number]] Collected: 08/12/24 0828    Lab Status: In process Specimen: Urine, Clean Catch Updated: 08/12/24 0842                   No orders to display              Procedures  Procedures         ED Course  ED Course as of 08/12/24 1459   Mon Aug 12, 2024   0840 Er md note- 6/8/24 labs/ ct scan of abd/pelvis report al reviewed by er md   0841 Er md note- external gu /perinal/perineal exam with er nurses present- normal external vaginal /urethra exam- normal perineal/perineal exam    0920 -er md note- pt placed on ceftin 250 mg bid x 10 days as [facility]    0920 Er md note -  current labs reviewed and compared to previous    1011 MOLLY YEE NOTE- AT PT REQUEST DAUGHTER TANIKA CALLED BY [facility]EE AND ALL D/C  INSTRUCTIONS AND REASONS WHEN TO RETURN EXPLAIEND TO HER- SHE VERBALIZES UNDERSTANDING                                                Medical Decision Making  Pt with pailess hematuria- with recent change in urianry habits- is already on oral antibx-- no fever/systemic ab/flank comps-  normal gu exam -- pt with recent 6/24 c t scan of abd/pelvis with no kidney calcifications with thickened bladder wall- pt will need cbc/bmp and ua and re-eval- likely will need to see urology as outpt for possible cystoscopy     Problems Addressed:  Hemorrhagic cystitis: acute illness or injury     Details: See chart and above     Amount and/or Complexity of Data Reviewed  Independent Historian: parent  External Data Reviewed: labs, radiology and notes.     Details: All reviewed by er md   Labs: ordered. Decision-making details documented in ED Course.     Details: All reviewed and compared by er md   Discussion of management or test interpretation with external provider(s): Moderate amount of er md thought complexity and workup     Risk  Decision regarding hospitalization.                 Disposition  Final diagnoses:   None     ED Disposition       None          Follow-up Information    None         Patient's Medications   Discharge Prescriptions    No medications on file       No discharge procedures on file.    PDMP Review         Value Time User    PDMP Reviewed  Yes 11/5/2021 10:32 AM Mundo Davis PA-C            ED Provider  Electronically Signed by             Yousuf Zacarias MD  08/12/24 8877

## 2024-08-14 ENCOUNTER — ANESTHESIA (OUTPATIENT)
Dept: GASTROENTEROLOGY | Facility: AMBULARY SURGERY CENTER | Age: 79
End: 2024-08-14

## 2024-08-14 ENCOUNTER — ANESTHESIA EVENT (OUTPATIENT)
Dept: GASTROENTEROLOGY | Facility: AMBULARY SURGERY CENTER | Age: 79
End: 2024-08-14

## 2024-08-14 ENCOUNTER — HOSPITAL ENCOUNTER (OUTPATIENT)
Dept: GASTROENTEROLOGY | Facility: AMBULARY SURGERY CENTER | Age: 79
Setting detail: OUTPATIENT SURGERY
Discharge: HOME | End: 2024-08-14
Payer: MEDICARE

## 2024-08-14 VITALS
TEMPERATURE: 97.7 F | HEIGHT: 62 IN | BODY MASS INDEX: 26.68 KG/M2 | HEART RATE: 85 BPM | RESPIRATION RATE: 16 BRPM | WEIGHT: 145 LBS | OXYGEN SATURATION: 99 % | DIASTOLIC BLOOD PRESSURE: 57 MMHG | SYSTOLIC BLOOD PRESSURE: 116 MMHG

## 2024-08-14 DIAGNOSIS — R10.32 LLQ PAIN: ICD-10-CM

## 2024-08-14 LAB — GLUCOSE SERPL-MCNC: 109 MG/DL (ref 65–140)

## 2024-08-14 PROCEDURE — 88305 TISSUE EXAM BY PATHOLOGIST: CPT | Performed by: PATHOLOGY

## 2024-08-14 PROCEDURE — 45380 COLONOSCOPY AND BIOPSY: CPT | Performed by: INTERNAL MEDICINE

## 2024-08-14 PROCEDURE — 82948 REAGENT STRIP/BLOOD GLUCOSE: CPT

## 2024-08-14 RX ORDER — PROPOFOL 10 MG/ML
INJECTION, EMULSION INTRAVENOUS AS NEEDED
Status: DISCONTINUED | OUTPATIENT
Start: 2024-08-14 | End: 2024-08-14

## 2024-08-14 RX ORDER — SODIUM CHLORIDE, SODIUM LACTATE, POTASSIUM CHLORIDE, CALCIUM CHLORIDE 600; 310; 30; 20 MG/100ML; MG/100ML; MG/100ML; MG/100ML
INJECTION, SOLUTION INTRAVENOUS CONTINUOUS PRN
Status: DISCONTINUED | OUTPATIENT
Start: 2024-08-14 | End: 2024-08-14

## 2024-08-14 RX ADMIN — PROPOFOL 30 MG: 10 INJECTION, EMULSION INTRAVENOUS at 07:44

## 2024-08-14 RX ADMIN — PROPOFOL 30 MG: 10 INJECTION, EMULSION INTRAVENOUS at 07:42

## 2024-08-14 RX ADMIN — SODIUM CHLORIDE, SODIUM LACTATE, POTASSIUM CHLORIDE, AND CALCIUM CHLORIDE: .6; .31; .03; .02 INJECTION, SOLUTION INTRAVENOUS at 07:31

## 2024-08-14 RX ADMIN — PROPOFOL 30 MG: 10 INJECTION, EMULSION INTRAVENOUS at 07:40

## 2024-08-14 RX ADMIN — PROPOFOL 30 MG: 10 INJECTION, EMULSION INTRAVENOUS at 07:36

## 2024-08-14 RX ADMIN — PROPOFOL 20 MG: 10 INJECTION, EMULSION INTRAVENOUS at 07:39

## 2024-08-14 RX ADMIN — PROPOFOL 20 MG: 10 INJECTION, EMULSION INTRAVENOUS at 07:41

## 2024-08-14 RX ADMIN — PROPOFOL 50 MG: 10 INJECTION, EMULSION INTRAVENOUS at 07:35

## 2024-08-14 RX ADMIN — PROPOFOL 30 MG: 10 INJECTION, EMULSION INTRAVENOUS at 07:38

## 2024-08-14 NOTE — ANESTHESIA PREPROCEDURE EVALUATION
Procedure:  COLONOSCOPY    Relevant Problems   CARDIO   (+) Hypertension      MUSCULOSKELETAL   (+) Lower back pain   (+) Neurogenic claudication due to lumbar spinal stenosis   (+) Scoliosis deformity of spine      NEURO/PSYCH   (+) Chronic right shoulder pain   (+) Neurogenic claudication due to lumbar spinal stenosis        Physical Exam    Airway    Mallampati score: II  TM Distance: >3 FB  Neck ROM: full     Dental    lower dentures    Cardiovascular      Pulmonary      Other Findings  post-pubertal.      Anesthesia Plan  ASA Score- 3     Anesthesia Type- IV sedation with anesthesia with ASA Monitors.         Additional Monitors:     Airway Plan:            Plan Factors-Exercise tolerance (METS): >4 METS.    Chart reviewed.    Patient summary reviewed.                  Induction- intravenous.    Postoperative Plan-         Informed Consent- Anesthetic plan and risks discussed with patient and daughter.  I personally reviewed this patient with the CRNA. Discussed and agreed on the Anesthesia Plan with the CRNA..

## 2024-08-14 NOTE — H&P
History and Physical -  [facility]  Ama KAILYN Arteaga 79 y.o. female MRN: [id number]        HPI: 79-year-old female with history of diverticulitis.  Reports doing well.    Historical Information   Past Medical History:   Diagnosis Date    Adrenal abnormality (HCC)     See endocrinologist for care-patient unsure of condition    Change in bowel habit     diarrhea-started end of November 2022    Chronic kidney disease     cyst on right kidney    Colon polyp     Diabetes (HCC)     Disease of thyroid gland     Diverticulitis     Dry heaves     hx of    Dysphagia     GERD (gastroesophageal reflux disease)     Hearing loss     wears B/L hearing aides    History of transfusion 1978    Tubal Pregnancy    Hyperlipidemia     Hypertension     Irregular heart beat     last saw Dr. Marshall 2 months ago: denies chest pain, SOB or palpitations per patient  2/22/23    Low back pain     Osteoporosis scoliosis    Skipped heart beats     checked by cardiologist (Dr. Marshall)-was told is no problem October 2021; also had ECHO and stress test    Tubal pregnancy      Past Surgical History:   Procedure Laterality Date    CARPAL TUNNEL RELEASE Bilateral     COLONOSCOPY      ECTOPIC PREGNANCY SURGERY      EGD      EYE SURGERY      Defuction of Right Eye Sac    NEUROPLASTY / TRANSPOSITION MEDIAN NERVE AT CARPAL TUNNEL BILATERAL      OSTEOTOMY TOES Left     2 and 3rd phalanges    OTHER SURGICAL HISTORY      Calcification removed from Right Thumb    MA ARTHRODESIS COMBINED TQ 1NTRSPC LUMBAR N/A 11/01/2021    Procedure: Minimally invasive transverse lumbar interbody fusion L4-5 from left-sided approach with decompressive laminectomy;  Surgeon: Stanislav Ervin MD;  Location:  [facility];  Service: Neurosurgery     Social History   Social History     Substance and Sexual Activity   Alcohol Use Not Currently     Social History     Substance and Sexual Activity   Drug Use Not Currently     Social History     Tobacco Use   Smoking Status  "Former    Current packs/day: 0.00    Types: Cigarettes    Quit date:     Years since quittin.6   Smokeless Tobacco Never   Tobacco Comments    quit 40 years ago     Family History   Problem Relation Age of Onset    Leukemia Mother     Stomach cancer Father     Aneurysm Brother     Lung cancer Brother     Kidney cancer Brother     COPD Brother     Aneurysm Brother     Anuerysm Brother     Hernia Brother     Lung cancer Brother        Meds/Allergies     Not in a hospital admission.    No Known Allergies    Objective     Blood pressure 144/82, pulse (!) 110, temperature 98.2 °F (36.8 °C), temperature source Temporal, resp. rate 18, height 5' 2\" (1.575 m), weight 65.8 kg (145 lb), SpO2 96%.    Physical Exam:    Chest- CTA  Heart- RRR  Abdomen- NT/ND  Extremities- No edema    ASSESSMENT:     History of diverticulitis, history of colon polyps    PLAN:    Colonoscopy              "

## 2024-08-14 NOTE — PERIOPERATIVE NURSING NOTE
"Patient states she feels \"fine\" hands warm to touch, skin turgor with capillary refill slow, but present.  "

## 2024-08-15 LAB
BACTERIA UR CULT: ABNORMAL
BACTERIA UR CULT: ABNORMAL

## 2024-08-15 NOTE — RESULT ENCOUNTER NOTE
I called the patient back per her request from the RN.  There is no answer on the machine.  Patient's urine culture results were reviewed.  She is on Ceftin but her colony count is less than 100,000.  No further action required

## 2024-08-16 PROCEDURE — 88305 TISSUE EXAM BY PATHOLOGIST: CPT | Performed by: PATHOLOGY

## 2024-08-27 ENCOUNTER — OFFICE VISIT (OUTPATIENT)
Dept: UROLOGY | Facility: CLINIC | Age: 79
End: 2024-08-27
Payer: MEDICARE

## 2024-08-27 VITALS
OXYGEN SATURATION: 97 % | HEART RATE: 95 BPM | HEIGHT: 62 IN | BODY MASS INDEX: 27.79 KG/M2 | SYSTOLIC BLOOD PRESSURE: 136 MMHG | WEIGHT: 151 LBS | DIASTOLIC BLOOD PRESSURE: 78 MMHG

## 2024-08-27 DIAGNOSIS — N30.91 HEMORRHAGIC CYSTITIS: Primary | ICD-10-CM

## 2024-08-27 DIAGNOSIS — E11.8 CONTROLLED TYPE 2 DIABETES MELLITUS WITH COMPLICATION, WITHOUT LONG-TERM CURRENT USE OF INSULIN (HCC): ICD-10-CM

## 2024-08-27 LAB
SL AMB  POCT GLUCOSE, UA: NORMAL
SL AMB LEUKOCYTE ESTERASE,UA: NORMAL
SL AMB POCT BILIRUBIN,UA: NORMAL
SL AMB POCT BLOOD,UA: NORMAL
SL AMB POCT CLARITY,UA: NORMAL
SL AMB POCT COLOR,UA: YELLOW
SL AMB POCT KETONES,UA: NORMAL
SL AMB POCT NITRITE,UA: NORMAL
SL AMB POCT PH,UA: 5
SL AMB POCT SPECIFIC GRAVITY,UA: 1
SL AMB POCT URINE PROTEIN: NORMAL
SL AMB POCT UROBILINOGEN: NORMAL

## 2024-08-27 PROCEDURE — 99203 OFFICE O/P NEW LOW 30 MIN: CPT | Performed by: UROLOGY

## 2024-08-27 PROCEDURE — 52000 CYSTOURETHROSCOPY: CPT | Performed by: UROLOGY

## 2024-08-27 PROCEDURE — 81002 URINALYSIS NONAUTO W/O SCOPE: CPT | Performed by: UROLOGY

## 2024-08-27 PROCEDURE — 87086 URINE CULTURE/COLONY COUNT: CPT | Performed by: UROLOGY

## 2024-08-27 PROCEDURE — 87186 SC STD MICRODIL/AGAR DIL: CPT | Performed by: UROLOGY

## 2024-08-27 PROCEDURE — 96372 THER/PROPH/DIAG INJ SC/IM: CPT

## 2024-08-27 PROCEDURE — 87077 CULTURE AEROBIC IDENTIFY: CPT | Performed by: UROLOGY

## 2024-08-27 RX ORDER — GENTAMICIN 40 MG/ML
80 INJECTION, SOLUTION INTRAMUSCULAR; INTRAVENOUS ONCE
Status: COMPLETED | OUTPATIENT
Start: 2024-08-27 | End: 2024-08-27

## 2024-08-27 RX ORDER — GENTAMICIN 40 MG/ML
3 INJECTION, SOLUTION INTRAMUSCULAR; INTRAVENOUS ONCE
Status: DISCONTINUED | OUTPATIENT
Start: 2024-08-27 | End: 2024-08-27

## 2024-08-27 RX ADMIN — GENTAMICIN 80 MG: 40 INJECTION, SOLUTION INTRAMUSCULAR; INTRAVENOUS at 14:27

## 2024-08-27 NOTE — PROGRESS NOTES
Assessment/Plan:    Hemorrhagic cystitis  The patient had gross hematuria almost certainly secondary to a complicated Klebsiella urinary tract infection.  Imaging and cystoscopy today did not show any other etiology for hematuria.  She has had hematuria before or since.  No further workup needed at this time.  Will send her urine out for culture just to see if she is chronically infected but even if culture returns positive do not plan for antibiotics since she is asymptomatic at this time.          Subjective:      Patient ID: Ama Arteaga is a 79 y.o. female.    HPI  79-year-old female with history of gross hematuria in August 2024 associated with a urinary tract infection.    The patient had UTI symptoms resulting in presentation to urgent care where she was put on empiric antibiotics.  She then patient presented to the emergency room a few days later on August 12, 2024 with gross hematuria.  Her urine culture wound up growing ESBL Klebsiella pneumonia and although unclear what antibiotic she was on originally likely not effective for her strain.    There are no past cultures in our system suggest a history of recurrent infections.    She reports she is now feeling well.  No hematuria before or since that episode.  Of note she has had upper tract imaging via CT scan with contrast in June 2024 which was unremarkable from a  standpoint other than mild bladder wall thickening.    Options were discussed with the patient today and she was told cystoscopy was not necessary as her hematuria was in setting of a urinary tract infection but the patient wanted cystoscopy to be sure there was no other pathology at play.  Cystoscopy today was unremarkable.  She was given gentamicin based on prior culture data to prevent head infection    Past Surgical History:   Procedure Laterality Date    CARPAL TUNNEL RELEASE Bilateral     COLONOSCOPY      ECTOPIC PREGNANCY SURGERY      EGD      EYE SURGERY      Defuction of Right Eye  Sac    NEUROPLASTY / TRANSPOSITION MEDIAN NERVE AT CARPAL TUNNEL BILATERAL      OSTEOTOMY TOES Left     2 and 3rd phalanges    OTHER SURGICAL HISTORY      Calcification removed from Right Thumb    WA ARTHRODESIS COMBINED TQ 1NTRSPC LUMBAR N/A 11/01/2021    Procedure: Minimally invasive transverse lumbar interbody fusion L4-5 from left-sided approach with decompressive laminectomy;  Surgeon: Stanislav Ervin MD;  Location: [facility];  Service: Neurosurgery        Past Medical History:   Diagnosis Date    Adrenal abnormality (HCC)     See endocrinologist for care-patient unsure of condition    Change in bowel habit     diarrhea-started end of November 2022    Chronic kidney disease     cyst on right kidney    Colon polyp     Diabetes (HCC)     Disease of thyroid gland     Diverticulitis     Dry heaves     hx of    Dysphagia     GERD (gastroesophageal reflux disease)     Hearing loss     wears B/L hearing aides    History of transfusion 1978    Tubal Pregnancy    Hyperlipidemia     Hypertension     Irregular heart beat     last saw Dr. Marshall 2 months ago: denies chest pain, SOB or palpitations per patient  2/22/23    Low back pain     Osteoporosis scoliosis    Skipped heart beats     checked by cardiologist (Dr. Marshall)-was told is no problem October 2021; also had ECHO and stress test    Tubal pregnancy              Review of Systems   Constitutional:  Negative for chills and fever.   HENT:  Negative for ear pain and sore throat.    Eyes:  Negative for pain and visual disturbance.   Respiratory:  Negative for cough and shortness of breath.    Cardiovascular:  Negative for chest pain and palpitations.   Gastrointestinal:  Negative for abdominal pain and vomiting.   Genitourinary:  Negative for dysuria and hematuria.   Musculoskeletal:  Negative for arthralgias and back pain.   Skin:  Negative for color change and rash.   Neurological:  Negative for seizures and syncope.   All other systems reviewed and are negative.     "    Objective:      /78 (BP Location: Left arm, Patient Position: Sitting, Cuff Size: Standard)   Pulse 95   Ht 5' 2\" (1.575 m)   Wt 68.5 kg (151 lb)   SpO2 97%   BMI 27.62 kg/m²     No results found for: \"PSA\"       Physical Exam  Vitals reviewed.   Constitutional:       General: She is not in acute distress.     Appearance: Normal appearance. She is not ill-appearing, toxic-appearing or diaphoretic.   HENT:      Head: Normocephalic and atraumatic.   Eyes:      Extraocular Movements: Extraocular movements intact.      Pupils: Pupils are equal, round, and reactive to light.   Pulmonary:      Effort: Pulmonary effort is normal.   Abdominal:      General: Abdomen is flat. There is no distension.      Palpations: Abdomen is soft. There is no mass.      Tenderness: There is no abdominal tenderness. There is no guarding or rebound.      Hernia: No hernia is present.   Skin:     General: Skin is warm.   Neurological:      General: No focal deficit present.      Mental Status: She is alert and oriented to person, place, and time. Mental status is at baseline.   Psychiatric:         Mood and Affect: Mood normal.         Behavior: Behavior normal.         Thought Content: Thought content normal.              Cystoscopy     Date/Time  8/27/2024 4:00 PM     Performed by  Cheikh Gooden MD   Authorized by  Cheikh Gooden MD     Universal Protocol:  Consent: Written consent obtained.  Risks and benefits: risks, benefits and alternatives were discussed  Consent given by: patient  Time out: Immediately prior to procedure a \"time out\" was called to verify the correct patient, procedure, equipment, support staff and site/side marked as required.  Patient understanding: patient states understanding of the procedure being performed  Patient consent: the patient's understanding of the procedure matches consent given  Procedure consent: procedure consent matches procedure scheduled  Patient identity confirmed: verbally with " patient      Procedure Details:  Procedure type: cystoscopy    Patient tolerance: Patient tolerated the procedure well with no immediate complications    Additional Procedure Details: A female MA was in the room and served as a chaperone and assistant    The patient's external genitals were sterilely prepped and draped.  Viscous lidocaine was introduced into the urethra  A flexible cystoscope was introduced into the urethra    Urethra: Normal  Bladder: No lesions. Ureteral orifices in orthotopic position.  No diverticula or trabeculations          CT ABDOMEN AND PELVIS WITH IV CONTRAST     INDICATION: LLQ pain.     COMPARISON: Compared with 12/10/2022.     TECHNIQUE: CT examination of the abdomen and pelvis was performed. Multiplanar 2D reformatted images were created from the source data.     This examination, like all CT scans performed in the [facility], was performed utilizing techniques to minimize radiation dose exposure, including the use of iterative reconstruction and automated exposure control. Radiation dose length   product (DLP) for this visit: 786 mGy-cm     IV Contrast: 80 mL of iohexol (OMNIPAQUE)  Enteric Contrast: Not administered.     FINDINGS:     ABDOMEN     LOWER CHEST: No clinically significant abnormality in the visualized lower chest.     LIVER/BILIARY TREE: Unremarkable.     GALLBLADDER: No calcified gallstones. No pericholecystic inflammatory change.     SPLEEN: Unremarkable.     PANCREAS: Unremarkable.     ADRENAL GLANDS: Unremarkable.     KIDNEYS/URETERS: Both kidneys enhance symmetrically with contrast. Parapelvic cysts in the left kidney. Right kidney lower pole exophytic simple cyst.. No hydronephrosis.     STOMACH AND BOWEL: Distal descending and proximal sigmoid region demonstrates masslike focal area of thickening with surrounding inflammatory changes. Diffuse diverticulosis. No perforation or abscess formation..     APPENDIX: No findings to suggest appendicitis.      ABDOMINOPELVIC CAVITY: No ascites. No pneumoperitoneum. No lymphadenopathy.     VESSELS: Unremarkable for patient's age.     PELVIS     REPRODUCTIVE ORGANS: Unremarkable for patient's age. Prominent parametrial vessels with prominent gonadal veins.     URINARY BLADDER: With mild apparent bladder wall thickening..     ABDOMINAL WALL/INGUINAL REGIONS: Unremarkable.     BONES: Pedicle screws at L4-L5 with intervertebral body disc cage. Multilevel degenerative disc changes no acute fracture or suspicious osseous lesion.     IMPRESSION:     Acute diverticulitis involving the distal descending proximal sigmoid region with no perforation or abscess. Masslike wall thickening seen. Colonoscopy follow-up per GI to exclude malignancy.     Mild apparent bladder wall thickening. UTI in the differential.    Orders  Orders Placed This Encounter   Procedures    Cystoscopy     This order was created via procedure documentation    Urine culture     Order Specific Question:   Release to patient through [employer]     Answer:   Immediate    POCT urine dip

## 2024-08-27 NOTE — ASSESSMENT & PLAN NOTE
The patient had gross hematuria almost certainly secondary to a complicated Klebsiella urinary tract infection.  Imaging and cystoscopy today did not show any other etiology for hematuria.  She has had hematuria before or since.  No further workup needed at this time.  Will send her urine out for culture just to see if she is chronically infected but even if culture returns positive do not plan for antibiotics since she is asymptomatic at this time.

## 2024-08-29 LAB
BACTERIA UR CULT: ABNORMAL
BACTERIA UR CULT: ABNORMAL

## 2024-09-04 ENCOUNTER — CLINICAL SUPPORT (OUTPATIENT)
Dept: NUTRITION | Facility: HOSPITAL | Age: 79
End: 2024-09-04
Payer: MEDICARE

## 2024-09-04 VITALS — BODY MASS INDEX: 27.44 KG/M2 | WEIGHT: 150 LBS

## 2024-09-04 DIAGNOSIS — E13.9 DIABETES MELLITUS OF OTHER TYPE WITHOUT COMPLICATION, UNSPECIFIED WHETHER LONG TERM INSULIN USE (HCC): ICD-10-CM

## 2024-09-04 DIAGNOSIS — K57.92 DIVERTICULITIS: ICD-10-CM

## 2024-09-04 PROCEDURE — 97802 MEDICAL NUTRITION INDIV IN: CPT

## 2024-09-04 NOTE — PROGRESS NOTES
" Nutrition Assessment Form    Patient Name: Ama Arteaga    YOB: 1945    Sex: Female     Assessment Date: 9/4/2024  Start Time: 1:30 pm Stop Time: 2:30 pm Total Minutes: 60     Data:  Present at session: self   Parent/Patient Concerns/reason for visit: \"I need to know how to eat\"   Medical Dx/Reason for Referral: K57.92 (ICD-10-CM) - Diverticulitis  E13.9 (ICD-10-CM) - Diabetes mellitus of other type without complication, unspecified whether long term insulin use    Past Medical History:   Diagnosis Date    Adrenal abnormality (HCC)     See endocrinologist for care-patient unsure of condition    Change in bowel habit     diarrhea-started end of November 2022    Chronic kidney disease     cyst on right kidney    Colon polyp     Diabetes (HCC)     Disease of thyroid gland     Diverticulitis     Dry heaves     hx of    Dysphagia     GERD (gastroesophageal reflux disease)     Hearing loss     wears B/L hearing aides    History of transfusion 1978    Tubal Pregnancy    Hyperlipidemia     Hypertension     Irregular heart beat     last saw Dr. Marshall 2 months ago: denies chest pain, SOB or palpitations per patient  2/22/23    Low back pain     Osteoporosis scoliosis    Skipped heart beats     checked by cardiologist (Dr. Marshall)-was told is no problem October 2021; also had ECHO and stress test    Tubal pregnancy        Current Outpatient Medications   Medication Sig Dispense Refill    acetaminophen (TYLENOL) 325 mg tablet Take 2 tablets (650 mg total) by mouth every 6 (six) hours  0    ALPRAZolam (XANAX) 0.25 mg tablet Take 0.25 mg by mouth daily at bedtime as needed      atorvastatin (LIPITOR) 20 mg tablet Take 20 mg by mouth daily at bedtime       CALCIUM PO Take by mouth      cefuroxime (CEFTIN) 250 mg tablet Take 250 mg by mouth      levothyroxine 25 mcg tablet Take 25 mcg by mouth daily in the early morning       meclizine (ANTIVERT) 25 mg tablet Take 25 mg by mouth every 6 (six) hours as needed      " "metFORMIN (GLUCOPHAGE-XR) 500 mg 24 hr tablet       metoprolol succinate (TOPROL-XL) 50 mg 24 hr tablet Take 50 mg by mouth daily at bedtime       multivitamin (THERAGRAN) TABS Take 1 tablet by mouth daily      nystatin-triamcinolone (MYCOLOG-II) ointment APPLY TO AFFECTED AREA S) TWO TIMES A DAY      triamcinolone (KENALOG) 0.1 % cream APPLY TO RASH AREA TWICE DAILY ONLY WHEN FLARED APPLY BARRIER OINTMENT ON TOP      VITAMIN D PO Take by mouth       No current facility-administered medications for this visit.        Additional Meds/Supplements:    Special Learning Needs/barriers to learning/any new barriers    Height: Ht Readings from Last 5 Encounters:   24 5' 2\" (1.575 m)   24 5' 2\" (1.575 m)   24 5' 2\" (1.575 m)   24 5' 2\" (1.575 m)   24 5' 2\" (1.575 m)      Weight: Wt Readings from Last 10 Encounters:   24 68.5 kg (151 lb)   24 65.8 kg (145 lb)   24 70.7 kg (155 lb 13.8 oz)   24 69.7 kg (153 lb 9.6 oz)   24 72.6 kg (160 lb)   24 72.6 kg (160 lb)   24 72.6 kg (160 lb)   23 72.6 kg (160 lb)   23 76.7 kg (169 lb)   22 76.7 kg (169 lb)     Estimated body mass index is 27.62 kg/m² as calculated from the following:    Height as of 24: 5' 2\" (1.575 m).    Weight as of 24: 68.5 kg (151 lb).   Recent Weight Change: [x]Yes     []No  Amount:  9 lb weight loss X 4 months unintentional      Energy Needs: [place]- St. Jeor Equation:   1200 kcal   No Known Allergies or intolerances    Social History     Substance and Sexual Activity   Alcohol Use Not Currently    No ETOH   Social History     Tobacco Use   Smoking Status Former    Current packs/day: 0.00    Types: Cigarettes    Quit date:     Years since quittin.7   Smokeless Tobacco Never   Tobacco Comments    quit 40 years ago       Who shops? patient   Who cooks/cooking methods/Eating out/take out habits   patient  Cooking methods: bake/de souza/air " de souza/grill/boil/other________    Take out: __2_ x/wk   Dining out ____1 x/wk [employer]    Exercise: Walks around the mall 3-4 days a week at least 1.5hr     Other: ie: Sleep habits/ stress level/ work habits household-lives with ?/ food security    Prior Nutritional Counseling? []Yes     [x]No  When:      Why:         Diet Hx:  Breakfast: Atkins bar  Oatmeal with 1/2 glass milk   Cereal grapes nuts flakes with banana 1-2% milk Diet:     Lunch: 6 piece chicken nuggets, Vietnamese fried, apple pie  Burger with fries    Water 6-8 16 oz bottles of water per day  Occasionally  cran apple juice   1 glass soda a day  Water with metmucil      Dinner: beef stew with carrots and potatoes   [place]re[place] soup  Chicken parmesan   Sweet potatoes, chickens breast          Snacks: AM -   PM - cheese cake  HS -    Other Notes/ Initial Assessment:  Was instructed to eat a high protein diet and avoid foods with skins but was not given long term instructions on diet for diverticulosis  Patient requested sample menus    Discussed the plate method of portioning foods, including half a plate fruits and vegetables or a half plate all vegetables, 1/4 of the plate a lean protein source or meat, and a 1/4 of the plate being a whole grain carb- usually 1/2-1c.  This should be followed for at least 2 meals of the day, but could also be followed for all 3.   RD reviewed sources of protein  RD provided Fiber Content of Foods list and 1200 kcal 5 Day Sample Menu  Reviewed how to choose healthy meals when dining out. Encouraged choosing grilled chickne vs breaded and to always chose a non-starchy vegetable  Educated patient on how to balance CHO at each meal     Updated assessment (Follow up note only):     Nutrition Diagnosis:   Food and nutrition related knowledge deficit  related to Lack of prior exposure to accurate nutrition related information as evidenced by Verbalizes inaccurate or incomplete information       Any change or new dx since  "previous visit:     Nutrition Diagnosis:   N/a      Medical Nutrition Therapy Intervention:  [x]Individualized Meal Plan 1200 kcal, high fiber []Understanding Lab Values   []Basic Pathophysiology of Disease []Food/Medication Interactions   []Food Diary []Exercise    [x]Lifestyle/Behavior Modification Techniques healthy dining out []Medication, Mechanism of Action   []Label Reading: CHO/ Na/ Fat/ other_________ []Self Blood Glucose Monitoring   []Weight/BMI Goals: gain/lose/maintain []Other -           Comprehension: []Excellent  []Very Good  []Good  [x]Fair   []Poor    Receptivity: []Excellent  []Very Good  [x]Good  []Fair   []Poor    Expected Compliance: []Excellent  []Very Good  [x]Good  []Fair   []Poor        Goals (initial)/ Progress made on previous goals/new goals:  1.Patient will balance meals with 2-3 servings CHO    2.Patient will chose non-starchy vegetables 2 x's  day   3.       No follow-ups on file.  Labs:  CMP  Lab Results   Component Value Date     01/02/2015    K 4.0 08/12/2024     08/12/2024    CO2 27 08/12/2024    ANIONGAP 4 01/02/2015    BUN 13 08/12/2024    CREATININE 0.76 08/12/2024    GLUCOSE 100 01/02/2015    GLUF 109 (H) 11/02/2021    CALCIUM 9.9 08/12/2024    CORRECTEDCA 9.9 11/08/2021    AST 15 06/08/2024    ALT 14 06/08/2024    ALKPHOS 81 06/08/2024    PROT 7.4 01/02/2015    BILITOT 0.3 01/02/2015    EGFR 73 08/12/2024       BMP  Lab Results   Component Value Date    GLUCOSE 100 01/02/2015    CALCIUM 9.9 08/12/2024     01/02/2015    K 4.0 08/12/2024    CO2 27 08/12/2024     08/12/2024    BUN 13 08/12/2024    CREATININE 0.76 08/12/2024       Lipids  Lab Results   Component Value Date    CHOL 241 01/03/2015     Lab Results   Component Value Date    HDL 48 01/03/2015     Lab Results   Component Value Date    LDLCALC 166 (H) 01/03/2015     Lab Results   Component Value Date    TRIG 134 01/03/2015     No results found for: \"CHOLHDL\"    Hemoglobin A1C  Lab Results " "  Component Value Date    HGBA1C 6.1 (H) 10/18/2023       Fasting Glucose  Lab Results   Component Value Date    GLUF 109 (H) 11/02/2021       Insulin     Thyroid  Lab Results   Component Value Date    TSH 1.46 10/18/2023       Hepatic Function Panel  Lab Results   Component Value Date    ALT 14 06/08/2024    AST 15 06/08/2024    ALKPHOS 81 06/08/2024    BILITOT 0.3 01/02/2015       Celiac Disease Antibody Panel  No results found for: \"ENDOMYSIAL IGA\", \"GLIADIN IGA\", \"GLIADIN IGG\", \"IGA\", \"TISSUE TRANSGLUT AB\", \"TTG IGA\"   Iron  No results found for: \"IRON\", \"TIBC\", \"FERRITIN\"         Jenelle Haro, STEVE  [facility]  [address]  [place] [id number]    "

## 2024-09-22 ENCOUNTER — HOSPITAL ENCOUNTER (EMERGENCY)
Facility: HOSPITAL | Age: 79
Discharge: HOME | End: 2024-09-22
Attending: EMERGENCY MEDICINE
Payer: MEDICARE

## 2024-09-22 ENCOUNTER — APPOINTMENT (EMERGENCY)
Dept: CT IMAGING | Facility: HOSPITAL | Age: 79
End: 2024-09-22
Payer: MEDICARE

## 2024-09-22 VITALS
HEART RATE: 86 BPM | DIASTOLIC BLOOD PRESSURE: 64 MMHG | OXYGEN SATURATION: 94 % | TEMPERATURE: 98.6 F | SYSTOLIC BLOOD PRESSURE: 137 MMHG | RESPIRATION RATE: 16 BRPM

## 2024-09-22 DIAGNOSIS — K57.92 DIVERTICULITIS: Primary | ICD-10-CM

## 2024-09-22 DIAGNOSIS — R93.5 ABNORMAL CT OF THE ABDOMEN: ICD-10-CM

## 2024-09-22 LAB
ALBUMIN SERPL BCG-MCNC: 4 G/DL (ref 3.5–5)
ALP SERPL-CCNC: 81 U/L (ref 34–104)
ALT SERPL W P-5'-P-CCNC: 15 U/L (ref 7–52)
ANION GAP SERPL CALCULATED.3IONS-SCNC: 8 MMOL/L (ref 4–13)
AST SERPL W P-5'-P-CCNC: 14 U/L (ref 13–39)
BASOPHILS # BLD AUTO: 0.05 THOUSANDS/ΜL (ref 0–0.1)
BASOPHILS NFR BLD AUTO: 0 % (ref 0–1)
BILIRUB SERPL-MCNC: 0.52 MG/DL (ref 0.2–1)
BUN SERPL-MCNC: 14 MG/DL (ref 5–25)
CALCIUM SERPL-MCNC: 9.6 MG/DL (ref 8.4–10.2)
CHLORIDE SERPL-SCNC: 101 MMOL/L (ref 96–108)
CO2 SERPL-SCNC: 27 MMOL/L (ref 21–32)
CREAT SERPL-MCNC: 0.66 MG/DL (ref 0.6–1.3)
EOSINOPHIL # BLD AUTO: 0.08 THOUSAND/ΜL (ref 0–0.61)
EOSINOPHIL NFR BLD AUTO: 1 % (ref 0–6)
ERYTHROCYTE [DISTWIDTH] IN BLOOD BY AUTOMATED COUNT: 12.8 % (ref 11.6–15.1)
GFR SERPL CREATININE-BSD FRML MDRD: 83 ML/MIN/1.73SQ M
GLUCOSE SERPL-MCNC: 128 MG/DL (ref 65–140)
HCT VFR BLD AUTO: 40.5 % (ref 34.8–46.1)
HGB BLD-MCNC: 13.3 G/DL (ref 11.5–15.4)
IMM GRANULOCYTES # BLD AUTO: 0.05 THOUSAND/UL (ref 0–0.2)
IMM GRANULOCYTES NFR BLD AUTO: 0 % (ref 0–2)
LIPASE SERPL-CCNC: 21 U/L (ref 11–82)
LYMPHOCYTES # BLD AUTO: 1 THOUSANDS/ΜL (ref 0.6–4.47)
LYMPHOCYTES NFR BLD AUTO: 7 % (ref 14–44)
MCH RBC QN AUTO: 28.2 PG (ref 26.8–34.3)
MCHC RBC AUTO-ENTMCNC: 32.8 G/DL (ref 31.4–37.4)
MCV RBC AUTO: 86 FL (ref 82–98)
MONOCYTES # BLD AUTO: 1.05 THOUSAND/ΜL (ref 0.17–1.22)
MONOCYTES NFR BLD AUTO: 8 % (ref 4–12)
NEUTROPHILS # BLD AUTO: 11.26 THOUSANDS/ΜL (ref 1.85–7.62)
NEUTS SEG NFR BLD AUTO: 84 % (ref 43–75)
NRBC BLD AUTO-RTO: 0 /100 WBCS
PLATELET # BLD AUTO: 250 THOUSANDS/UL (ref 149–390)
PMV BLD AUTO: 9.9 FL (ref 8.9–12.7)
POTASSIUM SERPL-SCNC: 4.3 MMOL/L (ref 3.5–5.3)
PROT SERPL-MCNC: 6.8 G/DL (ref 6.4–8.4)
RBC # BLD AUTO: 4.71 MILLION/UL (ref 3.81–5.12)
SODIUM SERPL-SCNC: 136 MMOL/L (ref 135–147)
WBC # BLD AUTO: 13.49 THOUSAND/UL (ref 4.31–10.16)

## 2024-09-22 PROCEDURE — 99285 EMERGENCY DEPT VISIT HI MDM: CPT | Performed by: STUDENT IN AN ORGANIZED HEALTH CARE EDUCATION/TRAINING PROGRAM

## 2024-09-22 PROCEDURE — 85025 COMPLETE CBC W/AUTO DIFF WBC: CPT

## 2024-09-22 PROCEDURE — 83690 ASSAY OF LIPASE: CPT

## 2024-09-22 PROCEDURE — 96365 THER/PROPH/DIAG IV INF INIT: CPT

## 2024-09-22 PROCEDURE — 93005 ELECTROCARDIOGRAM TRACING: CPT

## 2024-09-22 PROCEDURE — 99284 EMERGENCY DEPT VISIT MOD MDM: CPT

## 2024-09-22 PROCEDURE — 74177 CT ABD & PELVIS W/CONTRAST: CPT

## 2024-09-22 PROCEDURE — 36415 COLL VENOUS BLD VENIPUNCTURE: CPT

## 2024-09-22 PROCEDURE — 96366 THER/PROPH/DIAG IV INF ADDON: CPT

## 2024-09-22 PROCEDURE — 80053 COMPREHEN METABOLIC PANEL: CPT

## 2024-09-22 RX ORDER — ACETAMINOPHEN 325 MG/1
650 TABLET ORAL ONCE
Status: COMPLETED | OUTPATIENT
Start: 2024-09-22 | End: 2024-09-22

## 2024-09-22 RX ORDER — SODIUM CHLORIDE, SODIUM GLUCONATE, SODIUM ACETATE, POTASSIUM CHLORIDE, MAGNESIUM CHLORIDE, SODIUM PHOSPHATE, DIBASIC, AND POTASSIUM PHOSPHATE .53; .5; .37; .037; .03; .012; .00082 G/100ML; G/100ML; G/100ML; G/100ML; G/100ML; G/100ML; G/100ML
1000 INJECTION, SOLUTION INTRAVENOUS ONCE
Status: COMPLETED | OUTPATIENT
Start: 2024-09-22 | End: 2024-09-22

## 2024-09-22 RX ADMIN — SODIUM CHLORIDE, SODIUM GLUCONATE, SODIUM ACETATE, POTASSIUM CHLORIDE, MAGNESIUM CHLORIDE, SODIUM PHOSPHATE, DIBASIC, AND POTASSIUM PHOSPHATE 1000 ML: .53; .5; .37; .037; .03; .012; .00082 INJECTION, SOLUTION INTRAVENOUS at 07:57

## 2024-09-22 RX ADMIN — ACETAMINOPHEN 325MG 650 MG: 325 TABLET ORAL at 07:57

## 2024-09-22 RX ADMIN — IOHEXOL 80 ML: 350 INJECTION, SOLUTION INTRAVENOUS at 08:40

## 2024-09-22 NOTE — DISCHARGE INSTRUCTIONS
You were seen in the Emergency Department for: Abdominal pain    Your workup today showed: An uncomplicated diverticulitis (inflammation/infection of outpouchings of the bowel)    Your next steps should include: Call today to make an appointment with your primary care provider in one week.   over-the-counter fiber supplements to soften your stools.    Reasons to RETURN IMMEDIATELY to the Emergency Department: worsening symptoms, bloody bowel movements, difficulty breathing, temperature > 100.4 degrees, uncontrollable nausea/vomiting, or any other concerns.

## 2024-09-22 NOTE — ED PROVIDER NOTES
1. Diverticulitis    2. Abnormal CT of the abdomen      ED Disposition       ED Disposition   Discharge    Condition   Stable    Date/Time   Sun Sep 22, 2024 10:51 AM    Comment   Ama Arteaga discharge to home/self care.                   Assessment & Plan       Medical Decision Making  79-year-old female with PMHx of hypertension, type 2 diabetes, and diverticulosis presents for evaluation of LLQ abdominal pain    Differential diagnoses include but not limited to: Diverticulitis, hernia, pancreatitis, gastritis, bowel obstruction, UTI, kidney stone    Initial workup to include CBC, CMP, lipase, UA, CT abdomen pelvis    CBC, CMP, lipase without any concerning values.  CT abdomen and pelvis showed acute uncomplicated sigmoid diverticulitis.  Patient has Hinchey score of 0.  No antibiotic treatment indicated at this time.  Patient reevaluated following return of labs and CT scan and states that she has had improvement of symptoms while in ED.  Instructed to stay hydrated and eat plenty of fiber.  Patient understands and agrees to treatment plan, given return precautions if symptoms were to worsen or new onset fever, chills, chest pain, worsening abdominal pain      Amount and/or Complexity of Data Reviewed  Labs: ordered. Decision-making details documented in ED Course.  Radiology: ordered. Decision-making details documented in ED Course.    Risk  OTC drugs.  Prescription drug management.                ED Course as of 09/22/24 1639   Sun Sep 22, 2024   0811 CBC and differential(!)  Mild leukocytosis, minimal change from previous   0835 Comprehensive metabolic panel  WNL   0835 Lipase  WNL   0914 CT abdomen pelvis with contrast  IMPRESSION:     1.  Moderate acute uncomplicated sigmoid diverticulitis.  2.  Unchanged left adrenal nodule. The stability since at least 2021 statistically favors a benign adenoma.     Please refer to the report body for description of other incidental chronic and/or benign findings.         Workstation performed: [id number]     1004 Patient reevaluated, states that pain is well-controlled on Tylenol.  Discussed CT abdomen pelvis findings including uncomplicated sigmoid diverticulitis.  Patient notified that symptoms will likely resolve with hydration and high-fiber diet and no antibiotic treatment is needed.  Patient also informed of unchanged left adrenal nodule previously seen on 2021 CT scan which has not changed.  Patient understands CT findings and treatment plan   1051 Patient passed p.o. challenge states that left lower quadrant pain feels much better now and is ready for discharge       Medications   multi-electrolyte (ISOLYTE-S PH 7.4) bolus 1,000 mL (0 mL Intravenous Stopped 9/22/24 1126)   acetaminophen (TYLENOL) tablet 650 mg (650 mg Oral Given 9/22/24 0757)   iohexol (OMNIPAQUE) 350 MG/ML injection (MULTI-DOSE) 80 mL (80 mL Intravenous Given 9/22/24 9640)       History of Present Illness       79-year-old female with PMHx of hypertension, type 2 diabetes, and diverticulosis presents for evaluation of LLQ abdominal pain.  Patient states that pain has been intermittent for approximately 1 week but significantly worsened 1 day ago.  Patient states that pain is worse in left lower quadrant with radiation to the groin. She is currently in 3 out of 10 pain that feels like a stabbing sensation when pain arises.  Pain is worse with deep inspiration.  Denies any associated chest pain or shortness of breath.  Patient has no history of kidney stones or hernias in the past. At present denies any headaches, dizziness, fever, chills, sore throat, cough, chest pain, shortness of breath, nausea, vomiting, diarrhea, dysuria, hematuria.            Review of Systems   Gastrointestinal:  Positive for abdominal pain (LLQ w/ radiation into groin).   All other systems reviewed and are negative.          Objective     ED Triage Vitals   Temperature Pulse Blood Pressure Respirations SpO2 Patient Position -  Orthostatic VS   09/22/24 0713 09/22/24 0712 09/22/24 0712 09/22/24 0712 09/22/24 0712 09/22/24 0848   98.6 °F (37 °C) (!) 108 (!) 199/89 18 96 % Lying      Temp src Heart Rate Source BP Location FiO2 (%) Pain Score    -- 09/22/24 0848 09/22/24 0848 -- 09/22/24 0712     Monitor Right arm  3        Physical Exam  Constitutional:       Appearance: Normal appearance.   HENT:      Head: Normocephalic and atraumatic.   Eyes:      Extraocular Movements: Extraocular movements intact.      Conjunctiva/sclera: Conjunctivae normal.      Pupils: Pupils are equal, round, and reactive to light.   Cardiovascular:      Rate and Rhythm: Regular rhythm. Tachycardia present.      Heart sounds: Normal heart sounds. No murmur heard.     No friction rub. No gallop.   Pulmonary:      Effort: Pulmonary effort is normal. No respiratory distress.      Breath sounds: Normal breath sounds. No stridor. No wheezing, rhonchi or rales.   Abdominal:      General: Abdomen is flat, scaphoid and protuberant. Bowel sounds are normal. There is no distension.      Palpations: Abdomen is soft.      Tenderness: There is abdominal tenderness in the left lower quadrant. There is rebound. There is no right CVA tenderness, left CVA tenderness or guarding.   Musculoskeletal:      Cervical back: Normal range of motion and neck supple.   Skin:     General: Skin is warm and dry.      Capillary Refill: Capillary refill takes less than 2 seconds.   Neurological:      General: No focal deficit present.      Mental Status: She is alert and oriented to person, place, and time. Mental status is at baseline.   Psychiatric:         Mood and Affect: Mood normal.         Behavior: Behavior normal.         Labs Reviewed   CBC AND DIFFERENTIAL - Abnormal       Result Value    WBC 13.49 (*)     RBC 4.71      Hemoglobin 13.3      Hematocrit 40.5      MCV 86      MCH 28.2      MCHC 32.8      RDW 12.8      MPV 9.9      Platelets 250      nRBC 0      Segmented % 84 (*)      Immature Grans % 0      Lymphocytes % 7 (*)     Monocytes % 8      Eosinophils Relative 1      Basophils Relative 0      Absolute Neutrophils 11.26 (*)     Absolute Immature Grans 0.05      Absolute Lymphocytes 1.00      Absolute Monocytes 1.05      Eosinophils Absolute 0.08      Basophils Absolute 0.05     LIPASE - Normal    Lipase 21     COMPREHENSIVE METABOLIC PANEL    Sodium 136      Potassium 4.3      Chloride 101      CO2 27      ANION GAP 8      BUN 14      Creatinine 0.66      Glucose 128      Calcium 9.6      AST 14      ALT 15      Alkaline Phosphatase 81      Total Protein 6.8      Albumin 4.0      Total Bilirubin 0.52      eGFR 83      Narrative:     [employer] guidelines for Chronic Kidney Disease (CKD):     Stage 1 with normal or high GFR (GFR > 90 mL/min/1.73 square meters)    Stage 2 Mild CKD (GFR = 60-89 mL/min/1.73 square meters)    Stage 3A Moderate CKD (GFR = 45-59 mL/min/1.73 square meters)    Stage 3B Moderate CKD (GFR = 30-44 mL/min/1.73 square meters)    Stage 4 Severe CKD (GFR = 15-29 mL/min/1.73 square meters)    Stage 5 End Stage CKD (GFR <15 mL/min/1.73 square meters)  Note: GFR calculation is accurate only with a steady state creatinine     CT abdomen pelvis with contrast   Final Interpretation by Manfred Min MD (09/22 0910)      1.  Moderate acute uncomplicated sigmoid diverticulitis.   2.  Unchanged left adrenal nodule. The stability since at least 2021 statistically favors a benign adenoma.      Please refer to the report body for description of other incidental chronic and/or benign findings.         Workstation performed: [id number]             ECG 12 Lead Documentation Only    Date/Time: 9/22/2024 8:54 AM    Performed by: Ralph Heredia DO  Authorized by: Ralph Heredia DO    ECG reviewed by me, the ED Provider: yes    Patient location:  ED  Previous ECG:     Previous ECG:  Unavailable  Interpretation:     Interpretation: normal    Rate:     ECG rate  assessment: normal    Rhythm:     Rhythm: sinus rhythm    QRS:     QRS axis:  Normal  Conduction:     Conduction: normal    ST segments:     ST segments:  Normal  T waves:     T waves: normal        ED Medication and Procedure Management   Prior to Admission Medications   Prescriptions Last Dose Informant Patient Reported? Taking?   ALPRAZolam (XANAX) 0.25 mg tablet  Self Yes No   Sig: Take 0.25 mg by mouth daily at bedtime as needed   CALCIUM PO  Self Yes No   Sig: Take by mouth   VITAMIN D PO  Self Yes No   Sig: Take by mouth   acetaminophen (TYLENOL) 325 mg tablet  Self No No   Sig: Take 2 tablets (650 mg total) by mouth every 6 (six) hours   atorvastatin (LIPITOR) 20 mg tablet  Self Yes No   Sig: Take 20 mg by mouth daily at bedtime    cefuroxime (CEFTIN) 250 mg tablet   Yes No   Sig: Take 250 mg by mouth   levothyroxine 25 mcg tablet  Self Yes No   Sig: Take 25 mcg by mouth daily in the early morning    meclizine (ANTIVERT) 25 mg tablet  Self Yes No   Sig: Take 25 mg by mouth every 6 (six) hours as needed   metFORMIN (GLUCOPHAGE-XR) 500 mg 24 hr tablet  Self Yes No   metoprolol succinate (TOPROL-XL) 50 mg 24 hr tablet  Self Yes No   Sig: Take 50 mg by mouth daily at bedtime    multivitamin (THERAGRAN) TABS  Self Yes No   Sig: Take 1 tablet by mouth daily   nystatin-triamcinolone (MYCOLOG-II) ointment  Self Yes No   Sig: APPLY TO AFFECTED AREA S) TWO TIMES A DAY   triamcinolone (KENALOG) 0.1 % cream  Self Yes No   Sig: APPLY TO RASH AREA TWICE DAILY ONLY WHEN FLARED APPLY BARRIER OINTMENT ON TOP      Facility-Administered Medications: None     Discharge Medication List as of 9/22/2024 10:52 AM        CONTINUE these medications which have NOT CHANGED    Details   acetaminophen (TYLENOL) 325 mg tablet Take 2 tablets (650 mg total) by mouth every 6 (six) hours, Starting Fri 11/5/2021, No Print      ALPRAZolam (XANAX) 0.25 mg tablet Take 0.25 mg by mouth daily at bedtime as needed, Starting Tue 6/4/2024, Historical  Med      atorvastatin (LIPITOR) 20 mg tablet Take 20 mg by mouth daily at bedtime , Starting Fri 11/6/2020, Historical Med      CALCIUM PO Take by mouth, Historical Med      cefuroxime (CEFTIN) 250 mg tablet Take 250 mg by mouth, Starting Sun 8/11/2024, Historical Med      levothyroxine 25 mcg tablet Take 25 mcg by mouth daily in the early morning , Starting Fri 10/23/2020, Historical Med      meclizine (ANTIVERT) 25 mg tablet Take 25 mg by mouth every 6 (six) hours as needed, Historical Med      metFORMIN (GLUCOPHAGE-XR) 500 mg 24 hr tablet Starting Tue 10/18/2022, Historical Med      metoprolol succinate (TOPROL-XL) 50 mg 24 hr tablet Take 50 mg by mouth daily at bedtime , Starting Mon 12/28/2020, Historical Med      multivitamin (THERAGRAN) TABS Take 1 tablet by mouth daily, Historical Med      nystatin-triamcinolone (MYCOLOG-II) ointment APPLY TO AFFECTED AREA S) TWO TIMES A DAY, Historical Med      triamcinolone (KENALOG) 0.1 % cream APPLY TO RASH AREA TWICE DAILY ONLY WHEN FLARED APPLY BARRIER OINTMENT ON TOP, Historical Med      VITAMIN D PO Take by mouth, Historical Med           No discharge procedures on file.     Ralph Heredia, DO  09/22/24 8798

## 2024-09-22 NOTE — ED ATTENDING ATTESTATION
09/22/24    I, Eliana Mojica MD, saw and evaluated the patient. I have discussed the patient with the resident/non-physician practitioner and agree with the resident's/non-physician practitioner's findings, Plan of Care, and MDM as documented in the resident's/non-physician practitioner's note, except where noted. All available labs and Radiology studies were reviewed.  I was present for key portions of any procedure(s) performed by the resident/non-physician practitioner and I was immediately available to provide assistance.       At this point I agree with the current assessment done in the Emergency Department.  I have conducted an independent evaluation of this patient a history and physical is as follows:    Subjective: 79-year-old female with history of diverticulosis presenting with left lower quadrant pain without any other associated symptoms.    Objective: Left lower quadrant tenderness to palpation with rebound but no guarding.  Abdomen is soft.  Patient is well-appearing on exam.    Assessment/Plan: Presentation most consistent with diverticulitis, less likely other source of intra-abdominal pathology or ACS.    CT scan with uncomplicated diverticulitis.  Patient tolerated p.o. in the department and had stable vitals and was discharged to home with strict return precautions.    ED Course      Critical Care Time  Procedures

## 2024-09-26 LAB
ATRIAL RATE: 86 BPM
P AXIS: 48 DEGREES
PR INTERVAL: 150 MS
QRS AXIS: 8 DEGREES
QRSD INTERVAL: 68 MS
QT INTERVAL: 356 MS
QTC INTERVAL: 426 MS
T WAVE AXIS: 37 DEGREES
VENTRICULAR RATE: 86 BPM

## 2024-09-26 PROCEDURE — 93010 ELECTROCARDIOGRAM REPORT: CPT | Performed by: INTERNAL MEDICINE

## 2024-10-11 ENCOUNTER — HOSPITAL ENCOUNTER (INPATIENT)
Facility: HOSPITAL | Age: 79
LOS: 5 days | Discharge: HOME | DRG: 391 | End: 2024-10-17
Attending: EMERGENCY MEDICINE | Admitting: HOSPITALIST
Payer: MEDICARE

## 2024-10-11 ENCOUNTER — APPOINTMENT (EMERGENCY)
Dept: CT IMAGING | Facility: HOSPITAL | Age: 79
DRG: 391 | End: 2024-10-11
Payer: MEDICARE

## 2024-10-11 DIAGNOSIS — K59.00 CONSTIPATION: ICD-10-CM

## 2024-10-11 DIAGNOSIS — A41.9 SEPSIS (HCC): ICD-10-CM

## 2024-10-11 DIAGNOSIS — M48.061 SPINAL STENOSIS AT L4-L5 LEVEL: ICD-10-CM

## 2024-10-11 DIAGNOSIS — K57.92 DIVERTICULITIS: Primary | ICD-10-CM

## 2024-10-11 LAB
ALBUMIN SERPL BCG-MCNC: 4.5 G/DL (ref 3.5–5)
ALP SERPL-CCNC: 75 U/L (ref 34–104)
ALT SERPL W P-5'-P-CCNC: 18 U/L (ref 7–52)
ANION GAP SERPL CALCULATED.3IONS-SCNC: 10 MMOL/L (ref 4–13)
AST SERPL W P-5'-P-CCNC: 20 U/L (ref 13–39)
ATRIAL RATE: 73 BPM
BACTERIA UR QL AUTO: ABNORMAL /HPF
BASOPHILS # BLD AUTO: 0.05 THOUSANDS/ΜL (ref 0–0.1)
BASOPHILS NFR BLD AUTO: 0 % (ref 0–1)
BILIRUB SERPL-MCNC: 0.61 MG/DL (ref 0.2–1)
BILIRUB UR QL STRIP: NEGATIVE
BUN SERPL-MCNC: 12 MG/DL (ref 5–25)
CALCIUM SERPL-MCNC: 10 MG/DL (ref 8.4–10.2)
CHLORIDE SERPL-SCNC: 101 MMOL/L (ref 96–108)
CLARITY UR: CLEAR
CO2 SERPL-SCNC: 26 MMOL/L (ref 21–32)
COLOR UR: YELLOW
CREAT SERPL-MCNC: 0.74 MG/DL (ref 0.6–1.3)
CRP SERPL QL: 2.3 MG/L
EOSINOPHIL # BLD AUTO: 0.03 THOUSAND/ΜL (ref 0–0.61)
EOSINOPHIL NFR BLD AUTO: 0 % (ref 0–6)
ERYTHROCYTE [DISTWIDTH] IN BLOOD BY AUTOMATED COUNT: 12.8 % (ref 11.6–15.1)
GFR SERPL CREATININE-BSD FRML MDRD: 76 ML/MIN/1.73SQ M
GLUCOSE SERPL-MCNC: 203 MG/DL (ref 65–140)
GLUCOSE UR STRIP-MCNC: NEGATIVE MG/DL
HCT VFR BLD AUTO: 41.6 % (ref 34.8–46.1)
HGB BLD-MCNC: 13.7 G/DL (ref 11.5–15.4)
HGB UR QL STRIP.AUTO: NEGATIVE
IMM GRANULOCYTES # BLD AUTO: 0.04 THOUSAND/UL (ref 0–0.2)
IMM GRANULOCYTES NFR BLD AUTO: 0 % (ref 0–2)
KETONES UR STRIP-MCNC: ABNORMAL MG/DL
LEUKOCYTE ESTERASE UR QL STRIP: ABNORMAL
LIPASE SERPL-CCNC: 18 U/L (ref 11–82)
LYMPHOCYTES # BLD AUTO: 1.19 THOUSANDS/ΜL (ref 0.6–4.47)
LYMPHOCYTES NFR BLD AUTO: 10 % (ref 14–44)
MCH RBC QN AUTO: 28.5 PG (ref 26.8–34.3)
MCHC RBC AUTO-ENTMCNC: 32.9 G/DL (ref 31.4–37.4)
MCV RBC AUTO: 87 FL (ref 82–98)
MONOCYTES # BLD AUTO: 0.47 THOUSAND/ΜL (ref 0.17–1.22)
MONOCYTES NFR BLD AUTO: 4 % (ref 4–12)
MUCOUS THREADS UR QL AUTO: ABNORMAL
NEUTROPHILS # BLD AUTO: 9.65 THOUSANDS/ΜL (ref 1.85–7.62)
NEUTS SEG NFR BLD AUTO: 86 % (ref 43–75)
NITRITE UR QL STRIP: NEGATIVE
NON-SQ EPI CELLS URNS QL MICRO: ABNORMAL /HPF
NRBC BLD AUTO-RTO: 0 /100 WBCS
P AXIS: 71 DEGREES
PH UR STRIP.AUTO: 7 [PH]
PLATELET # BLD AUTO: 284 THOUSANDS/UL (ref 149–390)
PMV BLD AUTO: 9.8 FL (ref 8.9–12.7)
POTASSIUM SERPL-SCNC: 4.5 MMOL/L (ref 3.5–5.3)
PR INTERVAL: 172 MS
PROT SERPL-MCNC: 7.3 G/DL (ref 6.4–8.4)
PROT UR STRIP-MCNC: ABNORMAL MG/DL
QRS AXIS: 46 DEGREES
QRSD INTERVAL: 72 MS
QT INTERVAL: 396 MS
QTC INTERVAL: 436 MS
RBC # BLD AUTO: 4.81 MILLION/UL (ref 3.81–5.12)
RBC #/AREA URNS AUTO: ABNORMAL /HPF
SODIUM SERPL-SCNC: 137 MMOL/L (ref 135–147)
SP GR UR STRIP.AUTO: 1.02 (ref 1–1.03)
T WAVE AXIS: 44 DEGREES
UROBILINOGEN UR STRIP-ACNC: <2 MG/DL
VENTRICULAR RATE: 73 BPM
WBC # BLD AUTO: 11.43 THOUSAND/UL (ref 4.31–10.16)
WBC #/AREA URNS AUTO: ABNORMAL /HPF

## 2024-10-11 PROCEDURE — 93010 ELECTROCARDIOGRAM REPORT: CPT | Performed by: INTERNAL MEDICINE

## 2024-10-11 PROCEDURE — 83690 ASSAY OF LIPASE: CPT | Performed by: EMERGENCY MEDICINE

## 2024-10-11 PROCEDURE — 74177 CT ABD & PELVIS W/CONTRAST: CPT

## 2024-10-11 PROCEDURE — 96375 TX/PRO/DX INJ NEW DRUG ADDON: CPT

## 2024-10-11 PROCEDURE — 81001 URINALYSIS AUTO W/SCOPE: CPT

## 2024-10-11 PROCEDURE — 36415 COLL VENOUS BLD VENIPUNCTURE: CPT | Performed by: EMERGENCY MEDICINE

## 2024-10-11 PROCEDURE — 93005 ELECTROCARDIOGRAM TRACING: CPT

## 2024-10-11 PROCEDURE — 99222 1ST HOSP IP/OBS MODERATE 55: CPT | Performed by: INTERNAL MEDICINE

## 2024-10-11 PROCEDURE — 99284 EMERGENCY DEPT VISIT MOD MDM: CPT

## 2024-10-11 PROCEDURE — 80053 COMPREHEN METABOLIC PANEL: CPT | Performed by: EMERGENCY MEDICINE

## 2024-10-11 PROCEDURE — 85025 COMPLETE CBC W/AUTO DIFF WBC: CPT | Performed by: EMERGENCY MEDICINE

## 2024-10-11 PROCEDURE — 96376 TX/PRO/DX INJ SAME DRUG ADON: CPT

## 2024-10-11 PROCEDURE — 86140 C-REACTIVE PROTEIN: CPT | Performed by: INTERNAL MEDICINE

## 2024-10-11 PROCEDURE — 96365 THER/PROPH/DIAG IV INF INIT: CPT

## 2024-10-11 PROCEDURE — 99285 EMERGENCY DEPT VISIT HI MDM: CPT | Performed by: EMERGENCY MEDICINE

## 2024-10-11 RX ORDER — ATORVASTATIN CALCIUM 20 MG/1
20 TABLET, FILM COATED ORAL
Status: DISCONTINUED | OUTPATIENT
Start: 2024-10-11 | End: 2024-10-17 | Stop reason: HOSPADM

## 2024-10-11 RX ORDER — METOPROLOL SUCCINATE 50 MG/1
50 TABLET, EXTENDED RELEASE ORAL
Status: DISCONTINUED | OUTPATIENT
Start: 2024-10-11 | End: 2024-10-17 | Stop reason: HOSPADM

## 2024-10-11 RX ORDER — LEVOTHYROXINE SODIUM 25 UG/1
25 TABLET ORAL
Status: DISCONTINUED | OUTPATIENT
Start: 2024-10-12 | End: 2024-10-17 | Stop reason: HOSPADM

## 2024-10-11 RX ORDER — ACETAMINOPHEN 325 MG/1
650 TABLET ORAL EVERY 6 HOURS PRN
Status: DISCONTINUED | OUTPATIENT
Start: 2024-10-11 | End: 2024-10-17 | Stop reason: HOSPADM

## 2024-10-11 RX ORDER — HYDROMORPHONE HCL IN WATER/PF 6 MG/30 ML
0.2 PATIENT CONTROLLED ANALGESIA SYRINGE INTRAVENOUS ONCE
Status: COMPLETED | OUTPATIENT
Start: 2024-10-11 | End: 2024-10-11

## 2024-10-11 RX ORDER — ONDANSETRON 2 MG/ML
4 INJECTION INTRAMUSCULAR; INTRAVENOUS ONCE AS NEEDED
Status: DISCONTINUED | OUTPATIENT
Start: 2024-10-11 | End: 2024-10-17 | Stop reason: HOSPADM

## 2024-10-11 RX ORDER — METRONIDAZOLE 500 MG/100ML
500 INJECTION, SOLUTION INTRAVENOUS EVERY 8 HOURS
Status: DISCONTINUED | OUTPATIENT
Start: 2024-10-11 | End: 2024-10-11

## 2024-10-11 RX ORDER — POLYETHYLENE GLYCOL 3350 17 G/17G
17 POWDER, FOR SOLUTION ORAL DAILY
Status: DISCONTINUED | OUTPATIENT
Start: 2024-10-11 | End: 2024-10-16

## 2024-10-11 RX ORDER — ONDANSETRON 2 MG/ML
4 INJECTION INTRAMUSCULAR; INTRAVENOUS ONCE
Status: COMPLETED | OUTPATIENT
Start: 2024-10-11 | End: 2024-10-11

## 2024-10-11 RX ORDER — ALPRAZOLAM 0.25 MG/1
0.25 TABLET ORAL
Status: DISCONTINUED | OUTPATIENT
Start: 2024-10-11 | End: 2024-10-17 | Stop reason: HOSPADM

## 2024-10-11 RX ORDER — ACETAMINOPHEN 10 MG/ML
1000 INJECTION, SOLUTION INTRAVENOUS ONCE
Status: COMPLETED | OUTPATIENT
Start: 2024-10-11 | End: 2024-10-11

## 2024-10-11 RX ORDER — AMOXICILLIN 250 MG
1 CAPSULE ORAL
Status: DISCONTINUED | OUTPATIENT
Start: 2024-10-11 | End: 2024-10-12

## 2024-10-11 RX ORDER — KETOROLAC TROMETHAMINE 30 MG/ML
15 INJECTION, SOLUTION INTRAMUSCULAR; INTRAVENOUS ONCE
Status: COMPLETED | OUTPATIENT
Start: 2024-10-11 | End: 2024-10-11

## 2024-10-11 RX ORDER — ACETAMINOPHEN 325 MG/1
975 TABLET ORAL ONCE
Status: DISCONTINUED | OUTPATIENT
Start: 2024-10-11 | End: 2024-10-11

## 2024-10-11 RX ORDER — OXYCODONE HYDROCHLORIDE 5 MG/1
5 TABLET ORAL EVERY 4 HOURS PRN
Status: DISCONTINUED | OUTPATIENT
Start: 2024-10-11 | End: 2024-10-17 | Stop reason: HOSPADM

## 2024-10-11 RX ORDER — ENOXAPARIN SODIUM 100 MG/ML
40 INJECTION SUBCUTANEOUS DAILY
Status: DISCONTINUED | OUTPATIENT
Start: 2024-10-12 | End: 2024-10-17 | Stop reason: HOSPADM

## 2024-10-11 RX ORDER — HYDROMORPHONE HCL IN WATER/PF 6 MG/30 ML
0.2 PATIENT CONTROLLED ANALGESIA SYRINGE INTRAVENOUS
Status: DISCONTINUED | OUTPATIENT
Start: 2024-10-11 | End: 2024-10-17 | Stop reason: HOSPADM

## 2024-10-11 RX ADMIN — METOPROLOL SUCCINATE 50 MG: 50 TABLET, EXTENDED RELEASE ORAL at 21:01

## 2024-10-11 RX ADMIN — ONDANSETRON 4 MG: 2 INJECTION INTRAMUSCULAR; INTRAVENOUS at 11:50

## 2024-10-11 RX ADMIN — HYDROMORPHONE HYDROCHLORIDE 0.2 MG: 0.2 INJECTION, SOLUTION INTRAMUSCULAR; INTRAVENOUS; SUBCUTANEOUS at 14:48

## 2024-10-11 RX ADMIN — HYDROMORPHONE HYDROCHLORIDE 0.2 MG: 0.2 INJECTION, SOLUTION INTRAMUSCULAR; INTRAVENOUS; SUBCUTANEOUS at 13:03

## 2024-10-11 RX ADMIN — ATORVASTATIN CALCIUM 20 MG: 20 TABLET, FILM COATED ORAL at 21:01

## 2024-10-11 RX ADMIN — HYDROMORPHONE HYDROCHLORIDE 0.2 MG: 0.2 INJECTION, SOLUTION INTRAMUSCULAR; INTRAVENOUS; SUBCUTANEOUS at 21:38

## 2024-10-11 RX ADMIN — Medication 2.5 MG: at 21:02

## 2024-10-11 RX ADMIN — IOHEXOL 80 ML: 350 INJECTION, SOLUTION INTRAVENOUS at 12:58

## 2024-10-11 RX ADMIN — ACETAMINOPHEN 1000 MG: 10 INJECTION INTRAVENOUS at 12:19

## 2024-10-11 RX ADMIN — SENNOSIDES AND DOCUSATE SODIUM 1 TABLET: 8.6; 5 TABLET ORAL at 21:01

## 2024-10-11 RX ADMIN — SODIUM CHLORIDE 1000 ML: 0.9 INJECTION, SOLUTION INTRAVENOUS at 11:50

## 2024-10-11 RX ADMIN — POLYETHYLENE GLYCOL 3350 17 G: 17 POWDER, FOR SOLUTION ORAL at 16:10

## 2024-10-11 RX ADMIN — KETOROLAC TROMETHAMINE 15 MG: 30 INJECTION, SOLUTION INTRAMUSCULAR; INTRAVENOUS at 14:47

## 2024-10-11 RX ADMIN — ACETAMINOPHEN 650 MG: 325 TABLET ORAL at 20:47

## 2024-10-11 NOTE — ED PROVIDER NOTES
Time reflects when diagnosis was documented in both MDM as applicable and the Disposition within this note       Time User Action Codes Description Comment    10/11/2024  3:35 PM Moses Franks Add [K57.92] Diverticulitis     10/11/2024  3:35 PM Moses Franks Add [K59.00] Constipation           ED Disposition       ED Disposition   Admit    Condition   Stable    Date/Time   Fri Oct 11, 2024  3:35 PM    Comment   Case was discussed with KELSEA and the patient's admission status was agreed to be Admission Status: inpatient status to the service of Dr. Worthy .               Assessment & Plan       Medical Decision Making  Amount and/or Complexity of Data Reviewed  Labs: ordered.  Radiology: ordered. Decision-making details documented in ED Course.    Risk  OTC drugs.  Prescription drug management.  Decision regarding hospitalization.      See ED course for MDM.    ED Course as of 10/11/24 2036   Fri Oct 11, 2024   1149 DDx including but not limited to: appendicitis, gastroenteritis, gastritis, GERD, pancreatitis, colitis, enteritis, diverticulitis, constipation, renal colic, pyelonephritis, UTI     1334 Awaited patient, patient reports pain is still 10 out of 10, with nausea starting up.  Put another dose of as needed Zofran and, patient would like to wait until CT read for another dose of pain medication.   1517 CT abdomen pelvis with contrast  There is new mild diverticulitis within the more distal sigmoid colon, along with underlying constipation   1547 Patient received multiple doses of pain medication, and continues to report pain.  Ordered Ceftriaxone and Flagyl for diverticulitis, no surgery needed at this time.  Would like to admit patient for IV antibiotics and further management of her pain.   1610 Discussed results with patient and need for admission. Patient voices understanding and agrees with plan. All questions were addressed. No other concerns at this time.    Discussed patient's case with Dr. Worthy (KELSEA)  regarding admission who accepted the patient for further evaluation and management.         Medications   ondansetron (ZOFRAN) injection 4 mg (has no administration in time range)   senna-docusate sodium (SENOKOT S) 8.6-50 mg per tablet 1 tablet (has no administration in time range)   polyethylene glycol (MIRALAX) packet 17 g (17 g Oral Given 10/11/24 1610)   atorvastatin (LIPITOR) tablet 20 mg (has no administration in time range)   ALPRAZolam (XANAX) tablet 0.25 mg (has no administration in time range)   levothyroxine tablet 25 mcg (has no administration in time range)   metoprolol succinate (TOPROL-XL) 24 hr tablet 50 mg (has no administration in time range)   enoxaparin (LOVENOX) subcutaneous injection 40 mg (has no administration in time range)   ondansetron (ZOFRAN) injection 4 mg (4 mg Intravenous Given 10/11/24 1150)   sodium chloride 0.9 % bolus 1,000 mL (0 mL Intravenous Stopped 10/11/24 1315)   acetaminophen (Ofirmev) injection 1,000 mg (0 mg Intravenous Stopped 10/11/24 1302)   HYDROmorphone HCl (DILAUDID) injection 0.2 mg (0.2 mg Intravenous Given 10/11/24 1303)   iohexol (OMNIPAQUE) 350 MG/ML injection (SINGLE-DOSE) 80 mL (80 mL Intravenous Given 10/11/24 1258)   HYDROmorphone HCl (DILAUDID) injection 0.2 mg (0.2 mg Intravenous Given 10/11/24 1448)   ketorolac (TORADOL) injection 15 mg (15 mg Intravenous Given 10/11/24 1447)       ED Risk Strat Scores                           SBIRT 22yo+      Flowsheet Row Most Recent Value   Initial Alcohol Screen: US AUDIT-C     1. How often do you have a drink containing alcohol? 0 Filed at: 10/11/2024 1315   2. How many drinks containing alcohol do you have on a typical day you are drinking?  0 Filed at: 10/11/2024 1315   3a. Male UNDER 65: How often do you have five or more drinks on one occasion? 0 Filed at: 10/11/2024 1315   3b. FEMALE Any Age, or MALE 65+: How often do you have 4 or more drinks on one occassion? 0 Filed at: 10/11/2024 3875   Audit-C Score 0  Filed at: 10/11/2024 1315   JACQUELINE: How many times in the past year have you...    Used an illegal drug or used a prescription medication for non-medical reasons? Never Filed at: 10/11/2024 1315                            History of Present Illness       Chief Complaint   Patient presents with    Abdominal Pain     Patient reports LLQ pain, nausea, vomiting for a few days. Last BM on Wednesday. Hx of diverticulitis       Past Medical History:   Diagnosis Date    Adrenal abnormality (HCC)     See endocrinologist for care-patient unsure of condition    Change in bowel habit     diarrhea-started end of November 2022    Chronic kidney disease     cyst on right kidney    Colon polyp     Diabetes (HCC)     Disease of thyroid gland     Diverticulitis     Dry heaves     hx of    Dysphagia     GERD (gastroesophageal reflux disease)     Hearing loss     wears B/L hearing aides    History of transfusion 1978    Tubal Pregnancy    Hyperlipidemia     Hypertension     Irregular heart beat     last saw Dr. Marshall 2 months ago: denies chest pain, SOB or palpitations per patient  2/22/23    Low back pain     Osteoporosis scoliosis    Skipped heart beats     checked by cardiologist (Dr. Marshall)-was told is no problem October 2021; also had ECHO and stress test    Tubal pregnancy       Past Surgical History:   Procedure Laterality Date    CARPAL TUNNEL RELEASE Bilateral     COLONOSCOPY      ECTOPIC PREGNANCY SURGERY      EGD      EYE SURGERY      Defuction of Right Eye Sac    NEUROPLASTY / TRANSPOSITION MEDIAN NERVE AT CARPAL TUNNEL BILATERAL      OSTEOTOMY TOES Left     2 and 3rd phalanges    OTHER SURGICAL HISTORY      Calcification removed from Right Thumb    SD ARTHRODESIS COMBINED TQ 1NTRSPC LUMBAR N/A 11/01/2021    Procedure: Minimally invasive transverse lumbar interbody fusion L4-5 from left-sided approach with decompressive laminectomy;  Surgeon: Stanislav Ervin MD;  Location: [facility];  Service: Neurosurgery      Family History    Problem Relation Age of Onset    Leukemia Mother     Stomach cancer Father     Aneurysm Brother     Lung cancer Brother     Kidney cancer Brother     COPD Brother     Aneurysm Brother     Anuerysm Brother     Hernia Brother     Lung cancer Brother       Social History     Tobacco Use    Smoking status: Former     Current packs/day: 0.00     Types: Cigarettes     Quit date:      Years since quittin.8    Smokeless tobacco: Never    Tobacco comments:     quit 40 years ago   Vaping Use    Vaping status: Never Used   Substance Use Topics    Alcohol use: Not Currently    Drug use: Not Currently      E-Cigarette/Vaping    E-Cigarette Use Never User       E-Cigarette/Vaping Substances      I have reviewed and agree with the history as documented.     HPI    (Ama Arteaga) Ama Arteaga is a 79 y.o. female with a significant PMHx of diverticulitis presents to the emergency department on 2024 for left lower quadrant abdominal pain.   Pain is constant, located in left lower quadrant.  Patient was seen in the ED 24, found to have acute uncomplicated sigmoid diverticulitis on CT scan, no antibiotics.  Since then, patient reports that pain has gotten better, however in the past couple of days pain has gotten worse.  Associated with nausea, vomiting.  Last bowel movement was 2 days ago that was normal.  Patient denies fevers, but urinary changes, chest pain, or any other complaint at this time.      Allergies include:  No Known Allergies      Immunizations:  Immunization History   Administered Date(s) Administered    COVID-19 PFIZER VACCINE 0.3 ML IM 2021, 2021, 2021    INFLUENZA 2015, 10/25/2016, 10/27/2017, 10/22/2018, 2019, 2020, 10/04/2021, 10/14/2022    Influenza Split High Dose Preservative Free IM 2015, 10/25/2016, 10/27/2017, 10/22/2018, 2019    Influenza, Seasonal Vaccine, Quadrivalent, Adjuvanted, .5e 2020, 10/04/2021, 10/14/2022     Pneumococcal Conjugate 13-Valent 05/12/2015    Pneumococcal Polysaccharide PPV23 11/03/2014    Tdap 09/29/2011    Zoster 01/01/2011    Zoster Vaccine Recombinant 08/09/2021, 12/20/2021     Immunizations Reviewed.    Review of Systems   Constitutional:  Negative for activity change, fever and unexpected weight change.   Respiratory:  Negative for cough, chest tightness and shortness of breath.    Cardiovascular:  Negative for chest pain and palpitations.   Gastrointestinal:  Positive for abdominal pain, constipation, nausea and vomiting. Negative for anal bleeding, blood in stool and diarrhea.   Genitourinary:  Negative for dysuria and hematuria.   Skin:  Negative for wound.   Allergic/Immunologic: Negative for immunocompromised state.   Neurological:  Positive for weakness (generalized). Negative for syncope.   All other systems reviewed and are negative.          Objective       ED Triage Vitals   Temperature Pulse Blood Pressure Respirations SpO2 Patient Position - Orthostatic VS   10/11/24 1139 10/11/24 1134 10/11/24 1147 10/11/24 1134 10/11/24 1134 10/11/24 1134   98 °F (36.7 °C) 64 145/82 18 97 % Sitting      Temp Source Heart Rate Source BP Location FiO2 (%) Pain Score    10/11/24 1139 10/11/24 1134 10/11/24 1134 -- 10/11/24 1732    Oral Monitor Left arm  2      Vitals      Date and Time Temp Pulse SpO2 Resp BP Pain Score FACES Pain Rating User   10/11/24 1949 -- -- -- -- -- 4 -- CLS   10/11/24 1751 -- -- 95 % -- -- 3 -- ID   10/11/24 1732 -- -- -- -- -- 2 -- KK   10/11/24 1730 -- -- -- 18 -- -- -- KK   10/11/24 1730 99.4 °F (37.4 °C) 88 93 % -- 150/82 -- -- DII   10/11/24 1600 -- 93 96 % 16 139/79 -- -- LO   10/11/24 1430 -- 72 96 % 16 126/70 -- -- LO   10/11/24 1147 -- -- -- -- 145/82 -- -- LO   10/11/24 1139 98 °F (36.7 °C) -- -- -- -- -- -- BS   10/11/24 1134 -- 64 97 % 18 -- -- -- BS            Physical Exam  Vitals and nursing note reviewed.   Constitutional:       Appearance: She is not toxic-appearing  or diaphoretic.   HENT:      Head: Normocephalic and atraumatic.      Right Ear: External ear normal.      Left Ear: External ear normal.      Nose: Nose normal.      Mouth/Throat:      Mouth: Mucous membranes are moist.   Eyes:      General: No scleral icterus.     Extraocular Movements: Extraocular movements intact.      Conjunctiva/sclera: Conjunctivae normal.   Cardiovascular:      Rate and Rhythm: Normal rate and regular rhythm.      Pulses: Normal pulses.           Radial pulses are 2+ on the right side.        Dorsalis pedis pulses are 2+ on the right side and 2+ on the left side.      Heart sounds: Normal heart sounds, S1 normal and S2 normal. No murmur heard.  Pulmonary:      Effort: Pulmonary effort is normal. No respiratory distress.      Breath sounds: Normal breath sounds. No stridor. No wheezing.   Abdominal:      Palpations: Abdomen is soft.      Tenderness: There is abdominal tenderness in the left lower quadrant. There is guarding. There is no rebound. Negative signs include Marie's sign and Rovsing's sign.   Musculoskeletal:         General: Normal range of motion.      Cervical back: Normal range of motion.   Skin:     General: Skin is warm and dry.   Neurological:      General: No focal deficit present.      Mental Status: She is alert and oriented to person, place, and time.   Psychiatric:         Mood and Affect: Mood normal.         Results Reviewed       Procedure Component Value Units Date/Time    C-reactive protein [[id number]]  (Normal) Collected: 10/11/24 1152    Lab Status: Final result Specimen: Blood from Arm, Right Updated: 10/11/24 1625     CRP 2.3 mg/L     Urine Microscopic [[id number]]  (Abnormal) Collected: 10/11/24 1229    Lab Status: Final result Specimen: Urine, Other Updated: 10/11/24 1256     RBC, UA 2-4 /hpf      WBC, UA 4-10 /hpf      Epithelial Cells Occasional /hpf      Bacteria, UA Occasional /hpf      MUCUS THREADS Occasional    UA w Reflex to Microscopic w Reflex to  Culture [[id number]]  (Abnormal) Collected: 10/11/24 1229    Lab Status: Final result Specimen: Urine, Other Updated: 10/11/24 1252     Color, UA Yellow     Clarity, UA Clear     Specific Gravity, UA 1.018     pH, UA 7.0     Leukocytes, UA Large     Nitrite, UA Negative     Protein, UA 30 (1+) mg/dl      Glucose, UA Negative mg/dl      Ketones, UA 10 (1+) mg/dl      Urobilinogen, UA <2.0 mg/dl      Bilirubin, UA Negative     Occult Blood, UA Negative    Comprehensive metabolic panel [[id number]]  (Abnormal) Collected: 10/11/24 1152    Lab Status: Final result Specimen: Blood from Arm, Right Updated: 10/11/24 1218     Sodium 137 mmol/L      Potassium 4.5 mmol/L      Chloride 101 mmol/L      CO2 26 mmol/L      ANION GAP 10 mmol/L      BUN 12 mg/dL      Creatinine 0.74 mg/dL      Glucose 203 mg/dL      Calcium 10.0 mg/dL      AST 20 U/L      ALT 18 U/L      Alkaline Phosphatase 75 U/L      Total Protein 7.3 g/dL      Albumin 4.5 g/dL      Total Bilirubin 0.61 mg/dL      eGFR 76 ml/min/1.73sq m     Narrative:      National Kid[employer] Disease Foundation guidelines for Chronic Kidney Disease (CKD):     Stage 1 with normal or high GFR (GFR > 90 mL/min/1.73 square meters)    Stage 2 Mild CKD (GFR = 60-89 mL/min/1.73 square meters)    Stage 3A Moderate CKD (GFR = 45-59 mL/min/1.73 square meters)    Stage 3B Moderate CKD (GFR = 30-44 mL/min/1.73 square meters)    Stage 4 Severe CKD (GFR = 15-29 mL/min/1.73 square meters)    Stage 5 End Stage CKD (GFR <15 mL/min/1.73 square meters)  Note: GFR calculation is accurate only with a steady state creatinine    Lipase [[id number]]  (Normal) Collected: 10/11/24 1152    Lab Status: Final result Specimen: Blood from Arm, Right Updated: 10/11/24 1218     Lipase 18 u/L     CBC and differential [[id number]]  (Abnormal) Collected: 10/11/24 1152    Lab Status: Final result Specimen: Blood from Arm, Right Updated: 10/11/24 1158     WBC 11.43 Thousand/uL      RBC 4.81 Million/uL      Hemoglobin 13.7  g/dL      Hematocrit 41.6 %      MCV 87 fL      MCH 28.5 pg      MCHC 32.9 g/dL      RDW 12.8 %      MPV 9.8 fL      Platelets 284 Thousands/uL      nRBC 0 /100 WBCs      Segmented % 86 %      Immature Grans % 0 %      Lymphocytes % 10 %      Monocytes % 4 %      Eosinophils Relative 0 %      Basophils Relative 0 %      Absolute Neutrophils 9.65 Thousands/µL      Absolute Immature Grans 0.04 Thousand/uL      Absolute Lymphocytes 1.19 Thousands/µL      Absolute Monocytes 0.47 Thousand/µL      Eosinophils Absolute 0.03 Thousand/µL      Basophils Absolute 0.05 Thousands/µL             CT abdomen pelvis with contrast   Final Interpretation by Manfred Min MD (10/11 1458)      1.  There is new mild diverticulitis within the more distal sigmoid colon, along with underlying constipation. Please refer to the report body for description of other incidental chronic and/or benign findings.         Workstation performed: [id number]             Procedures    ED Medication and Procedure Management   Prior to Admission Medications   Prescriptions Last Dose Informant Patient Reported? Taking?   ALPRAZolam (XANAX) 0.25 mg tablet Past Month Self Yes Yes   Sig: Take 0.25 mg by mouth daily at bedtime as needed   CALCIUM PO Not Taking Self Yes No   Sig: Take by mouth   Patient not taking: Reported on 10/11/2024   VITAMIN D PO Not Taking Self Yes No   Sig: Take by mouth   Patient not taking: Reported on 10/11/2024   acetaminophen (TYLENOL) 325 mg tablet  Self No No   Sig: Take 2 tablets (650 mg total) by mouth every 6 (six) hours   atorvastatin (LIPITOR) 20 mg tablet 10/10/2024 Self Yes Yes   Sig: Take 20 mg by mouth daily at bedtime    cefuroxime (CEFTIN) 250 mg tablet Not Taking  Yes No   Sig: Take 250 mg by mouth   Patient not taking: Reported on 10/11/2024   levothyroxine 25 mcg tablet 10/11/2024 Self Yes Yes   Sig: Take 25 mcg by mouth daily in the early morning    meclizine (ANTIVERT) 25 mg tablet More than a month Self Yes  No   Sig: Take 25 mg by mouth every 6 (six) hours as needed   metFORMIN (GLUCOPHAGE-XR) 500 mg 24 hr tablet 10/10/2024 Self Yes Yes   metoprolol succinate (TOPROL-XL) 50 mg 24 hr tablet 10/10/2024 Self Yes Yes   Sig: Take 50 mg by mouth daily at bedtime    multivitamin (THERAGRAN) TABS Not Taking Self Yes No   Sig: Take 1 tablet by mouth daily   Patient not taking: Reported on 10/11/2024   nystatin-triamcinolone (MYCOLOG-II) ointment 10/10/2024 Self Yes Yes   Sig: APPLY TO AFFECTED AREA S) TWO TIMES A DAY   triamcinolone (KENALOG) 0.1 % cream Past Week Self Yes Yes   Sig: APPLY TO RASH AREA TWICE DAILY ONLY WHEN FLARED APPLY BARRIER OINTMENT ON TOP      Facility-Administered Medications: None     Current Discharge Medication List        CONTINUE these medications which have NOT CHANGED    Details   ALPRAZolam (XANAX) 0.25 mg tablet Take 0.25 mg by mouth daily at bedtime as needed      atorvastatin (LIPITOR) 20 mg tablet Take 20 mg by mouth daily at bedtime       levothyroxine 25 mcg tablet Take 25 mcg by mouth daily in the early morning       metFORMIN (GLUCOPHAGE-XR) 500 mg 24 hr tablet       metoprolol succinate (TOPROL-XL) 50 mg 24 hr tablet Take 50 mg by mouth daily at bedtime       nystatin-triamcinolone (MYCOLOG-II) ointment APPLY TO AFFECTED AREA S) TWO TIMES A DAY      triamcinolone (KENALOG) 0.1 % cream APPLY TO RASH AREA TWICE DAILY ONLY WHEN FLARED APPLY BARRIER OINTMENT ON TOP      acetaminophen (TYLENOL) 325 mg tablet Take 2 tablets (650 mg total) by mouth every 6 (six) hours  Refills: 0    Associated Diagnoses: Spinal stenosis at L4-L5 level      CALCIUM PO Take by mouth      cefuroxime (CEFTIN) 250 mg tablet Take 250 mg by mouth      meclizine (ANTIVERT) 25 mg tablet Take 25 mg by mouth every 6 (six) hours as needed      multivitamin (THERAGRAN) TABS Take 1 tablet by mouth daily      VITAMIN D PO Take by mouth           No discharge procedures on file.  ED SEPSIS DOCUMENTATION   Time reflects when  diagnosis was documented in both MDM as applicable and the Disposition within this note       Time User Action Codes Description Comment    10/11/2024  3:35 PM Moses Franks [K57.92] Diverticulitis     10/11/2024  3:35 PM Moses Franks [K59.00] Constipation                  Perri Almanzar MD  10/11/24 2036

## 2024-10-11 NOTE — PLAN OF CARE
Problem: PAIN - ADULT  Goal: Verbalizes/displays adequate comfort level or baseline comfort level  Description: Interventions:  - Encourage patient to monitor pain and request assistance  - Assess pain using appropriate pain scale  - Administer analgesics based on type and severity of pain and evaluate response  - Implement non-pharmacological measures as appropriate and evaluate response  - Consider cultural and social influences on pain and pain management  - Notify physician/advanced practitioner if interventions unsuccessful or patient reports new pain  Outcome: Progressing     Problem: INFECTION - ADULT  Goal: Absence or prevention of progression during hospitalization  Description: INTERVENTIONS:  - Assess and monitor for signs and symptoms of infection  - Monitor lab/diagnostic results  - Monitor all insertion sites, i.e. indwelling lines, tubes, and drains  - Monitor endotracheal if appropriate and nasal secretions for changes in amount and color  - [place] appropriate cooling/warming therapies per order  - Administer medications as ordered  - Instruct and encourage patient and family to use good hand hygiene technique  - Identify and instruct in appropriate isolation precautions for identified infection/condition  Outcome: Progressing     Problem: SAFETY ADULT  Goal: Patient will remain free of falls  Description: INTERVENTIONS:  - Educate patient/family on patient safety including physical limitations  - Instruct patient to call for assistance with activity   - Consult OT/PT to assist with strengthening/mobility   - Keep Call bell within reach  - Keep bed low and locked with side rails adjusted as appropriate  - Keep care items and personal belongings within reach  - Initiate and maintain comfort rounds  - Make Fall Risk Sign visible to staff  - Offer Toileting every 2 Hours, in advance of need  - Initiate/Maintain bed/chair alarm    Problem: DISCHARGE PLANNING  Goal: Discharge to home or other  facility with appropriate resources  Description: INTERVENTIONS:  - Identify barriers to discharge w/patient and caregiver  - Arrange for needed discharge resources and transportation as appropriate  - Identify discharge learning needs (meds, wound care, etc.)  - Arrange for interpretive services to assist at discharge as needed  - Refer to Case Management Department for coordinating discharge planning if the patient needs post-hospital services based on physician/advanced practitioner order or complex needs related to functional status, cognitive ability, or social support system  Outcome: Progressing   - Apply yellow socks and bracelet for high fall risk patients  - Consider moving patient to room near nurses station  Outcome: Progressing

## 2024-10-11 NOTE — ED ATTENDING ATTESTATION
10/11/2024  IMoses DO, saw and evaluated the patient. I have discussed the patient with the resident/non-physician practitioner and agree with the resident's/non-physician practitioner's findings, Plan of Care, and MDM as documented in the resident's/non-physician practitioner's note, except where noted. All available labs and Radiology studies were reviewed.  I was present for key portions of any procedure(s) performed by the resident/non-physician practitioner and I was immediately available to provide assistance.       At this point I agree with the current assessment done in the Emergency Department.  I have conducted an independent evaluation of this patient a history and physical is as follows:    79-year-old female with history of recurrent diverticulitis, 2 episodes in the past 2 months, coming into the ED for evaluation of left lower quadrant abdominal pain.  Also mitts to some constipation with last bowel movement 2 days ago.  She states last episode of diverticulitis was not treated with antibiotics, she was discharged home, but has had persistent discomfort. Symptoms worsened today, pain became severe abruptly and she vomited.  Daughter at bedside states she looked quite ill at home prior to coming in, worse than the last ED visit.    PE:  The patient is ill appearing, non-toxic, in NAD. Head: normocephalic, atraumatic. HEENT: mucous membranes moist.  Lungs: CTA b/l, no resp distress. Heart: RRR. No M/R/G. Abdomen: LLQ abd tenderness, ND, no R/R/G. Neuro: CN2-12 intact, GCS 15. Normal strength and sensation, normal speech and gait. Cap refill < 2 sec, skin warm and dry. No rashes or lesions.    ED eval - labs, urine CT abd pelvis to assess for diverticulitis, r/o complicated divertic, SBO, constipation, fecal impaction.  Admit for recurrent diverticulitis on CT w/ constipation and intractable abdominal pain. Ordered IV abx for the patient, however internal med hospitalist canceled orders and  plans to monitor off of antibiotics due at this time.      ED Course         Critical Care Time  Procedures

## 2024-10-11 NOTE — ASSESSMENT & PLAN NOTE
Patient presenting with recurrent episodes of diverticulitis, uncomplicated, she is afebrile, has a very mild leukocytosis  We will hold off on antibiotics  Continue pain medications  Monitor abdominal exam  Patient reports last bowel movement 2 days ago

## 2024-10-11 NOTE — H&P
"H&P - Hospitalist   Name: Ama Arteaga 79 y.o. female I MRN: [id number]  Unit/Bed#: ED-16 I Date of Admission: 10/11/2024   Date of Service: 10/11/2024 I Hospital Day: 0     Assessment & Plan  Diverticulitis  Patient presenting with recurrent episodes of diverticulitis, uncomplicated, she is afebrile, has a very mild leukocytosis  We will hold off on antibiotics  Continue pain medications  Monitor abdominal exam  Patient reports last bowel movement 2 days ago    Hypertension  Continue metoprolol  Controlled type 2 diabetes mellitus with complication, without long-term current use of insulin ([facility])  Lab Results   Component Value Date    HGBA1C 6.1 (H) 10/18/2023       No results for input(s): \"POCGLU\" in the last 72 hours.    Blood Sugar Average: Last 72 hrs:          VTE Pharmacologic Prophylaxis:   Moderate Risk (Score 3-4) - Pharmacological DVT Prophylaxis Ordered: heparin.  Code Status: Prior level 1  Discussion with family: Patient declined call to .     Anticipated Length of Stay: Patient will be admitted on an observation basis with an anticipated length of stay of less than 2 midnights secondary to diverticulitis.    History of Present Illness   Chief Complaint: Abdominal pain    Ama Arteaga is a 79 y.o. female with a PMH of diabetes on metformin, hypertension hyperlipidemia who presents with abdominal pain.  Patient reports onset of left lower quadrant abdominal pain over the past 2 to 3 days progressively worsening not associated with nausea and an episode of vomiting nonbloody nonbilious.  Patient reports last bowel movement was 2 days ago, she is passing flatus.  She denies any fevers or chills.  Of note she was recently seen in the ER in September found to have uncomplicated sigmoid diverticulitis and was discharged without antibiotics.  She reports after discharge she improved, until recently.  During my evaluation she does not appear to be systemically ill, she has a mild leukocytosis " but otherwise her vitals are stable she is afebrile.  Patient will be admitted for diverticulitis, will monitor off antibiotics, however if has frequent recurrences may need to be evaluated by colorectal surgery     Review of Systems   Constitutional:  Negative for chills, fatigue and fever.   HENT:  Negative for ear pain and sore throat.    Eyes:  Negative for pain and visual disturbance.   Respiratory:  Negative for cough, shortness of breath and wheezing.    Cardiovascular:  Negative for chest pain, palpitations and leg swelling.   Gastrointestinal:  Positive for abdominal pain, nausea and vomiting.   Genitourinary:  Negative for dysuria and hematuria.   Musculoskeletal:  Negative for arthralgias and back pain.   Skin:  Negative for color change and rash.   Neurological:  Negative for seizures and syncope.   All other systems reviewed and are negative.      Historical Information   Past Medical History:   Diagnosis Date    Adrenal abnormality (HCC)     See endocrinologist for care-patient unsure of condition    Change in bowel habit     diarrhea-started end of November 2022    Chronic kidney disease     cyst on right kidney    Colon polyp     Diabetes (HCC)     Disease of thyroid gland     Diverticulitis     Dry heaves     hx of    Dysphagia     GERD (gastroesophageal reflux disease)     Hearing loss     wears B/L hearing aides    History of transfusion 1978    Tubal Pregnancy    Hyperlipidemia     Hypertension     Irregular heart beat     last saw Dr. Marshall 2 months ago: denies chest pain, SOB or palpitations per patient  2/22/23    Low back pain     Osteoporosis scoliosis    Skipped heart beats     checked by cardiologist (Dr. Marshall)-was told is no problem October 2021; also had ECHO and stress test    Tubal pregnancy      Past Surgical History:   Procedure Laterality Date    CARPAL TUNNEL RELEASE Bilateral     COLONOSCOPY      ECTOPIC PREGNANCY SURGERY      EGD      EYE SURGERY      Defuction of Right Eye Sac     NEUROPLASTY / TRANSPOSITION MEDIAN NERVE AT CARPAL TUNNEL BILATERAL      OSTEOTOMY TOES Left     2 and 3rd phalanges    OTHER SURGICAL HISTORY      Calcification removed from Right Thumb    [place] ARTHRODESIS COMBINED TQ 1NTRSPC LUMBAR N/A 2021    Procedure: Minimally invasive transverse lumbar interbody fusion L4-5 from left-sided approach with decompressive laminectomy;  Surgeon: Stanislav Ervin MD;  Location: [facility];  Service: Neurosurgery     Social History     Tobacco Use    Smoking status: Former     Current packs/day: 0.00     Types: Cigarettes     Quit date:      Years since quittin.8    Smokeless tobacco: Never    Tobacco comments:     quit 40 years ago   Vaping Use    Vaping status: Never Used   Substance and Sexual Activity    Alcohol use: Not Currently    Drug use: Not Currently    Sexual activity: Not on file     E-Cigarette/Vaping    E-Cigarette Use Never User      E-Cigarette/Vaping Substances     Family History   Problem Relation Age of Onset    Leukemia Mother     Stomach cancer Father     Aneurysm Brother     Lung cancer Brother     Kidney cancer Brother     COPD Brother     Aneurysm Brother     Anuerysm Brother     Hernia Brother     Lung cancer Brother      Social History:  Marital Status: /Civil Union   Occupation:   Patient Pre-hospital Living Situation: Home  Patient Pre-hospital Level of Mobility: walks  Patient Pre-hospital Diet Restrictions:     Meds/Allergies   (Not in a hospital admission)    No Known Allergies    Objective :  Temp:  [98 °F (36.7 °C)] 98 °F (36.7 °C)  HR:  [64-72] 72  BP: (126-145)/(70-82) 126/70  Resp:  [16-18] 16  SpO2:  [96 %-97 %] 96 %  O2 Device: Nasal cannula  Nasal Cannula O2 Flow Rate (L/min):  [2 L/min] 2 L/min    Physical Exam  Constitutional:       General: She is not in acute distress.     Appearance: She is not toxic-appearing or diaphoretic.   Eyes:      General: No scleral icterus.  Cardiovascular:      Rate and Rhythm: Normal rate  and regular rhythm.      Heart sounds: No murmur heard.     No friction rub. No gallop.   Pulmonary:      Effort: No respiratory distress.      Breath sounds: No stridor. No wheezing, rhonchi or rales.   Chest:      Chest wall: No tenderness.   Abdominal:      General: There is no distension.      Palpations: There is no mass.      Tenderness: There is abdominal tenderness. There is no guarding or rebound.      Hernia: No hernia is present.      Comments: Ttp llq   Musculoskeletal:         General: No swelling or tenderness.   Neurological:      Mental Status: She is oriented to person, place, and time.         Lines/Drains:            Lab Results: I have reviewed the following results:  Results from last 7 days   Lab Units 10/11/24  1152   WBC Thousand/uL 11.43*   HEMOGLOBIN g/dL 13.7   HEMATOCRIT % 41.6   PLATELETS Thousands/uL 284   SEGS PCT % 86*   LYMPHO PCT % 10*   MONO PCT % 4   EOS PCT % 0     Results from last 7 days   Lab Units 10/11/24  1152   SODIUM mmol/L 137   POTASSIUM mmol/L 4.5   CHLORIDE mmol/L 101   CO2 mmol/L 26   BUN mg/dL 12   CREATININE mg/dL 0.74   ANION GAP mmol/L 10   CALCIUM mg/dL 10.0   ALBUMIN g/dL 4.5   TOTAL BILIRUBIN mg/dL 0.61   ALK PHOS U/L 75   ALT U/L 18   AST U/L 20   GLUCOSE RANDOM mg/dL 203*             Lab Results   Component Value Date    HGBA1C 6.1 (H) 10/18/2023    HGBA1C 6.4 (H) 04/18/2023    HGBA1C 6.3 (H) 10/18/2022           Imaging Results Review: I reviewed radiology reports from this admission including: CT abdomen/pelvis.  Other Study Results Review: EKG was reviewed.     Administrative Statements       ** Please Note: This note has been constructed using a voice recognition system. **

## 2024-10-11 NOTE — ASSESSMENT & PLAN NOTE
"Lab Results   Component Value Date    HGBA1C 6.1 (H) 10/18/2023       No results for input(s): \"POCGLU\" in the last 72 hours.    Blood Sugar Average: Last 72 hrs:      "

## 2024-10-12 PROBLEM — E87.1 HYPONATREMIA: Status: ACTIVE | Noted: 2024-10-12

## 2024-10-12 PROBLEM — A41.9 SEPSIS (HCC): Status: ACTIVE | Noted: 2024-10-12

## 2024-10-12 LAB
ALBUMIN SERPL BCG-MCNC: 4 G/DL (ref 3.5–5)
ALP SERPL-CCNC: 69 U/L (ref 34–104)
ALT SERPL W P-5'-P-CCNC: 15 U/L (ref 7–52)
ANION GAP SERPL CALCULATED.3IONS-SCNC: 8 MMOL/L (ref 4–13)
AST SERPL W P-5'-P-CCNC: 13 U/L (ref 13–39)
BASOPHILS # BLD AUTO: 0.05 THOUSANDS/ΜL (ref 0–0.1)
BASOPHILS NFR BLD AUTO: 0 % (ref 0–1)
BILIRUB SERPL-MCNC: 0.81 MG/DL (ref 0.2–1)
BUN SERPL-MCNC: 12 MG/DL (ref 5–25)
CALCIUM SERPL-MCNC: 9.2 MG/DL (ref 8.4–10.2)
CHLORIDE SERPL-SCNC: 96 MMOL/L (ref 96–108)
CO2 SERPL-SCNC: 26 MMOL/L (ref 21–32)
CREAT SERPL-MCNC: 0.69 MG/DL (ref 0.6–1.3)
EOSINOPHIL # BLD AUTO: 0 THOUSAND/ΜL (ref 0–0.61)
EOSINOPHIL NFR BLD AUTO: 0 % (ref 0–6)
ERYTHROCYTE [DISTWIDTH] IN BLOOD BY AUTOMATED COUNT: 13.1 % (ref 11.6–15.1)
EST. AVERAGE GLUCOSE BLD GHB EST-MCNC: 128 MG/DL
GFR SERPL CREATININE-BSD FRML MDRD: 81 ML/MIN/1.73SQ M
GLUCOSE SERPL-MCNC: 131 MG/DL (ref 65–140)
GLUCOSE SERPL-MCNC: 135 MG/DL (ref 65–140)
GLUCOSE SERPL-MCNC: 147 MG/DL (ref 65–140)
HBA1C MFR BLD: 6.1 %
HCT VFR BLD AUTO: 39.3 % (ref 34.8–46.1)
HGB BLD-MCNC: 12.9 G/DL (ref 11.5–15.4)
IMM GRANULOCYTES # BLD AUTO: 0.12 THOUSAND/UL (ref 0–0.2)
IMM GRANULOCYTES NFR BLD AUTO: 1 % (ref 0–2)
LACTATE SERPL-SCNC: 1.7 MMOL/L (ref 0.5–2)
LACTATE SERPL-SCNC: 2.1 MMOL/L (ref 0.5–2)
LACTATE SERPL-SCNC: 2.1 MMOL/L (ref 0.5–2)
LACTATE SERPL-SCNC: 2.4 MMOL/L (ref 0.5–2)
LYMPHOCYTES # BLD AUTO: 0.57 THOUSANDS/ΜL (ref 0.6–4.47)
LYMPHOCYTES NFR BLD AUTO: 3 % (ref 14–44)
MCH RBC QN AUTO: 28.2 PG (ref 26.8–34.3)
MCHC RBC AUTO-ENTMCNC: 32.8 G/DL (ref 31.4–37.4)
MCV RBC AUTO: 86 FL (ref 82–98)
MONOCYTES # BLD AUTO: 1.51 THOUSAND/ΜL (ref 0.17–1.22)
MONOCYTES NFR BLD AUTO: 8 % (ref 4–12)
NEUTROPHILS # BLD AUTO: 17.49 THOUSANDS/ΜL (ref 1.85–7.62)
NEUTS SEG NFR BLD AUTO: 88 % (ref 43–75)
NRBC BLD AUTO-RTO: 0 /100 WBCS
PLATELET # BLD AUTO: 259 THOUSANDS/UL (ref 149–390)
PMV BLD AUTO: 10.2 FL (ref 8.9–12.7)
POTASSIUM SERPL-SCNC: 4.1 MMOL/L (ref 3.5–5.3)
PROCALCITONIN SERPL-MCNC: 0.43 NG/ML
PROT SERPL-MCNC: 6.6 G/DL (ref 6.4–8.4)
RBC # BLD AUTO: 4.57 MILLION/UL (ref 3.81–5.12)
SODIUM SERPL-SCNC: 130 MMOL/L (ref 135–147)
SODIUM SERPL-SCNC: 132 MMOL/L (ref 135–147)
WBC # BLD AUTO: 19.74 THOUSAND/UL (ref 4.31–10.16)

## 2024-10-12 PROCEDURE — 99232 SBSQ HOSP IP/OBS MODERATE 35: CPT | Performed by: PHYSICIAN ASSISTANT

## 2024-10-12 PROCEDURE — 87040 BLOOD CULTURE FOR BACTERIA: CPT | Performed by: PHYSICIAN ASSISTANT

## 2024-10-12 PROCEDURE — 83036 HEMOGLOBIN GLYCOSYLATED A1C: CPT | Performed by: PHYSICIAN ASSISTANT

## 2024-10-12 PROCEDURE — 83605 ASSAY OF LACTIC ACID: CPT | Performed by: HOSPITALIST

## 2024-10-12 PROCEDURE — 84295 ASSAY OF SERUM SODIUM: CPT | Performed by: PHYSICIAN ASSISTANT

## 2024-10-12 PROCEDURE — 83605 ASSAY OF LACTIC ACID: CPT | Performed by: PHYSICIAN ASSISTANT

## 2024-10-12 PROCEDURE — 85025 COMPLETE CBC W/AUTO DIFF WBC: CPT | Performed by: INTERNAL MEDICINE

## 2024-10-12 PROCEDURE — 84145 PROCALCITONIN (PCT): CPT | Performed by: PHYSICIAN ASSISTANT

## 2024-10-12 PROCEDURE — 82948 REAGENT STRIP/BLOOD GLUCOSE: CPT

## 2024-10-12 PROCEDURE — 80053 COMPREHEN METABOLIC PANEL: CPT | Performed by: INTERNAL MEDICINE

## 2024-10-12 RX ORDER — CEFAZOLIN SODIUM 2 G/50ML
2000 SOLUTION INTRAVENOUS EVERY 8 HOURS
Status: DISCONTINUED | OUTPATIENT
Start: 2024-10-12 | End: 2024-10-12

## 2024-10-12 RX ORDER — AMOXICILLIN 250 MG
1 CAPSULE ORAL 2 TIMES DAILY
Status: DISCONTINUED | OUTPATIENT
Start: 2024-10-12 | End: 2024-10-16

## 2024-10-12 RX ORDER — INSULIN LISPRO 100 [IU]/ML
1-5 INJECTION, SOLUTION INTRAVENOUS; SUBCUTANEOUS
Status: DISCONTINUED | OUTPATIENT
Start: 2024-10-12 | End: 2024-10-14

## 2024-10-12 RX ORDER — METRONIDAZOLE 500 MG/100ML
500 INJECTION, SOLUTION INTRAVENOUS EVERY 8 HOURS
Status: DISCONTINUED | OUTPATIENT
Start: 2024-10-12 | End: 2024-10-12

## 2024-10-12 RX ORDER — SODIUM CHLORIDE 9 MG/ML
100 INJECTION, SOLUTION INTRAVENOUS CONTINUOUS
Status: DISCONTINUED | OUTPATIENT
Start: 2024-10-12 | End: 2024-10-16

## 2024-10-12 RX ADMIN — SENNOSIDES AND DOCUSATE SODIUM 1 TABLET: 8.6; 5 TABLET ORAL at 18:10

## 2024-10-12 RX ADMIN — LEVOTHYROXINE SODIUM 25 MCG: 25 TABLET ORAL at 05:42

## 2024-10-12 RX ADMIN — PIPERACILLIN SODIUM AND TAZOBACTAM SODIUM 4.5 G: 36; 4.5 INJECTION, POWDER, LYOPHILIZED, FOR SOLUTION INTRAVENOUS at 23:08

## 2024-10-12 RX ADMIN — ENOXAPARIN SODIUM 40 MG: 40 INJECTION SUBCUTANEOUS at 08:25

## 2024-10-12 RX ADMIN — OXYCODONE HYDROCHLORIDE 5 MG: 5 TABLET ORAL at 04:40

## 2024-10-12 RX ADMIN — SENNOSIDES AND DOCUSATE SODIUM 1 TABLET: 8.6; 5 TABLET ORAL at 11:01

## 2024-10-12 RX ADMIN — HYDROMORPHONE HYDROCHLORIDE 0.2 MG: 0.2 INJECTION, SOLUTION INTRAMUSCULAR; INTRAVENOUS; SUBCUTANEOUS at 00:41

## 2024-10-12 RX ADMIN — OXYCODONE HYDROCHLORIDE 5 MG: 5 TABLET ORAL at 23:08

## 2024-10-12 RX ADMIN — PIPERACILLIN AND TAZOBACTAM 4.5 G: 36; 4.5 INJECTION, POWDER, FOR SOLUTION INTRAVENOUS at 11:02

## 2024-10-12 RX ADMIN — PIPERACILLIN SODIUM AND TAZOBACTAM SODIUM 4.5 G: 36; 4.5 INJECTION, POWDER, LYOPHILIZED, FOR SOLUTION INTRAVENOUS at 15:48

## 2024-10-12 RX ADMIN — ACETAMINOPHEN 650 MG: 325 TABLET ORAL at 08:25

## 2024-10-12 RX ADMIN — ATORVASTATIN CALCIUM 20 MG: 20 TABLET, FILM COATED ORAL at 21:10

## 2024-10-12 RX ADMIN — POLYETHYLENE GLYCOL 3350 17 G: 17 POWDER, FOR SOLUTION ORAL at 08:25

## 2024-10-12 RX ADMIN — METOPROLOL SUCCINATE 50 MG: 50 TABLET, EXTENDED RELEASE ORAL at 21:10

## 2024-10-12 RX ADMIN — ALPRAZOLAM 0.25 MG: 0.25 TABLET ORAL at 00:44

## 2024-10-12 RX ADMIN — ACETAMINOPHEN 650 MG: 325 TABLET ORAL at 15:48

## 2024-10-12 RX ADMIN — SODIUM CHLORIDE 75 ML/HR: 0.9 INJECTION, SOLUTION INTRAVENOUS at 11:02

## 2024-10-12 NOTE — ASSESSMENT & PLAN NOTE
"Lab Results   Component Value Date    HGBA1C 6.1 (H) 10/18/2023       No results for input(s): \"POCGLU\" in the last 72 hours.    Blood Sugar Average: Last 72 hrs:    Metformin use only  SSI ACHS to be added  "

## 2024-10-12 NOTE — ASSESSMENT & PLAN NOTE
Patient meets septic criteria given WBC 19, temp 100.7 and HR 97.  Check lactic acid   Check blood cultures  Check procalcitonin  LFTS WNL  BP stable

## 2024-10-12 NOTE — ASSESSMENT & PLAN NOTE
CT: There is new mild diverticulitis within the more distal sigmoid colon, along with underlying constipation. Please refer to the report body for description of other incidental chronic and/or benign findings.   First episode of diverticulitis - seen in ED 6/2024, discharged home on augmentin. Second episode - seen in ED 9/22/24, discharged home off ABX.  Colonoscopy 8/2024: Extensive diverticulosis of severe severity in the transverse colon, descending colon and sigmoid colon   Last BM Wednesday  Was withheld from antibiotics on admission secondary to mild case with only minimal elevation in white count and mild symptoms.  However since that time has developed fever, significant elevated white count and continued pain and therefore will initiate IV Zosyn.  Abdominal exam remains benign.  De-escalate diet to surgical soft/lo fiber/lo residue.   Nutrition consult for dietary recommendations for ongoing education  Requests aqua K pad  Miralax, senna, colace  Add IV fluids

## 2024-10-12 NOTE — SEPSIS NOTE
"  Sepsis Note   Ama Arteaga 79 y.o. female MRN: [id number]  Unit/Bed#: W -01 Encounter: [id number]       Initial Sepsis Screening       Row Name 10/12/24 0066                Is the patient's history suggestive of a new or worsening infection? Yes (Proceed)  -LUCI        Suspected source of infection acute abdominal infection  -LUCI        Indicate SIRS criteria Hyperthemia > 38.3C (100.9F) OR Hypothermia <36C (96.8F);Tachycardia > 90 bpm;Leukocytosis (WBC > 61742 IJL) OR Leukopenia (WBC <4000 IJL) OR Bandemia (WBC >10% bands)  -LUCI        Are two or more of the above signs & symptoms of infection both present and new to the patient? Yes (Proceed)  -LUCI        Assess for evidence of organ dysfunction: Are any of the below criteria present within 6 hours of suspected infection and SIRS criteria that are NOT considered to be chronic conditions? Lactate > 2.0  -LUCI        Date of presentation of severe sepsis 10/12/24  -LUCI        Time of presentation of severe sepsis --        Sepsis Note: Click \"NEXT\" below (NOT \"close\") to generate sepsis note based on above information. --                  User Key  (r) = Recorded By, (t) = Taken By, (c) = Cosigned By      Initials Name Provider Type    LUCI Hartley PA-C Physician Assistant                        Body mass index is 27.69 kg/m².  Wt Readings from Last 1 Encounters:   10/12/24 68.7 kg (151 lb 6.4 oz)     IBW (Ideal Body Weight): 50.1 kg    Ideal body weight: 50.1 kg (110 lb 7.2 oz)  Adjusted ideal body weight: 57.5 kg (126 lb 13.3 oz)    Patient meets criteria for severe sepsis given fever, tachycardia, WBC and lactic acid of 2.4. BP is 136/74. No indication for IV fluids with BP stable and lactic of 2.4. if lactic increases, will give IV fluid bolus. Continue IV ABX with zosyn. Sepsis alert called. RN updated.     "

## 2024-10-12 NOTE — PROGRESS NOTES
"Progress Note - Hospitalist   Name: Ama Arteaga 79 y.o. female I MRN: [id number]  Unit/Bed#: W -01 I Date of Admission: 10/11/2024   Date of Service: 10/12/2024 I Hospital Day: 0    Assessment & Plan  Diverticulitis  CT: There is new mild diverticulitis within the more distal sigmoid colon, along with underlying constipation. Please refer to the report body for description of other incidental chronic and/or benign findings.   First episode of diverticulitis - seen in ED 6/2024, discharged home on augmentin. Second episode - seen in ED 9/22/24, discharged home off ABX.  Colonoscopy 8/2024: Extensive diverticulosis of severe severity in the transverse colon, descending colon and sigmoid colon   Last BM Wednesday  Was withheld from antibiotics on admission secondary to mild case with only minimal elevation in white count and mild symptoms.  However since that time has developed fever, significant elevated white count and continued pain and therefore will initiate IV Zosyn.  Abdominal exam remains benign.  De-escalate diet to surgical soft/lo fiber/lo residue.   Nutrition consult for dietary recommendations for ongoing education  Requests aqua K pad  Miralax, senna, colace  Add IV fluids  Sepsis (HCC)  Patient meets septic criteria given WBC 19, temp 100.7 and HR 97.  Check lactic acid   Check blood cultures  Check procalcitonin  LFTS WNL  BP stable  Hyponatremia  Lab Results   Component Value Date    SODIUM 130 (L) 10/12/2024    SODIUM 137 10/11/2024    SODIUM 136 09/22/2024     S/P 1 L NSS in the ED on arrival and 1 dose of IV toradol  Minimal PO intake  Start IV fluids for maintenance but need to ensure sodium not dropping - check sodium later this afternoon  Hypertension  Continue metoprolol  SBP stable  Controlled type 2 diabetes mellitus with complication, without long-term current use of insulin (HCC)  Lab Results   Component Value Date    HGBA1C 6.1 (H) 10/18/2023       No results for input(s): \"POCGLU\" " in the last 72 hours.    Blood Sugar Average: Last 72 hrs:    Metformin use only  SSI ACHS to be added    VTE Pharmacologic Prophylaxis: VTE Score: 6 High Risk (Score >/= 5) - Pharmacological DVT Prophylaxis Ordered: enoxaparin (Lovenox). Sequential Compression Devices Ordered.    Mobility:   Basic Mobility Inpatient Raw Score: 23  JH-HLM Goal: 7: Walk 25 feet or more  JH-HLM Achieved: 7: Walk 25 feet or more  JH-HLM Goal achieved. Continue to encourage appropriate mobility.    Patient Centered Rounds: I performed bedside rounds with nursing staff today.   Discussions with Specialists or Other Care Team Provider: nursing    Education and Discussions with Family / Patient: Updated  (daughter) at bedside.    Current Length of Stay: 0 day(s)  Current Patient Status: Inpatient   Certification Statement: The patient, admitted on an observation basis, will now require > 2 midnight hospital stay due to sepsis, diverticulitis  Discharge Plan: Anticipate discharge in 48 hrs to home.    Code Status: Level 1 - Full Code    Subjective   Patient reports that she has not had a bowel movement since Wednesday.  She is very concerned regarding her diverticulitis and fiber diet.  She reports that nutritionist told her that she could eat what ever she wanted and that it will not impact her diverticulitis.    Objective :  Temp:  [99.4 °F (37.4 °C)-100.7 °F (38.2 °C)] 100.7 °F (38.2 °C)  HR:  [] 97  BP: (126-150)/(70-82) 129/77  Resp:  [12-18] 12  SpO2:  [93 %-96 %] 95 %  O2 Device: None (Room air)  Nasal Cannula O2 Flow Rate (L/min):  [2 L/min] 2 L/min    Body mass index is 27.69 kg/m².     Input and Output Summary (last 24 hours):     Intake/Output Summary (Last 24 hours) at 10/12/2024 1416  Last data filed at 10/12/2024 1415  Gross per 24 hour   Intake 570 ml   Output --   Net 570 ml       Physical Exam  Vitals and nursing note reviewed.   Constitutional:       General: She is not in acute distress.      Appearance: Normal appearance. She is not diaphoretic.   HENT:      Head: Normocephalic and atraumatic.      Mouth/Throat:      Mouth: Mucous membranes are moist.   Cardiovascular:      Rate and Rhythm: Normal rate and regular rhythm.   Pulmonary:      Effort: Pulmonary effort is normal.      Breath sounds: Normal breath sounds. No wheezing or rales.   Abdominal:      General: Bowel sounds are normal.      Palpations: Abdomen is soft.      Tenderness: There is abdominal tenderness (LLQ, RLQ). There is no guarding.   Musculoskeletal:      Right lower leg: No edema.      Left lower leg: No edema.   Skin:     General: Skin is warm and dry.   Neurological:      Mental Status: She is alert.   Psychiatric:         Mood and Affect: Mood normal.         Behavior: Behavior normal.       Lines/Drains:              Lab Results: I have reviewed the following results:   Results from last 7 days   Lab Units 10/12/24  0442   WBC Thousand/uL 19.74*   HEMOGLOBIN g/dL 12.9   HEMATOCRIT % 39.3   PLATELETS Thousands/uL 259   SEGS PCT % 88*   LYMPHO PCT % 3*   MONO PCT % 8   EOS PCT % 0     Results from last 7 days   Lab Units 10/12/24  0442   SODIUM mmol/L 130*   POTASSIUM mmol/L 4.1   CHLORIDE mmol/L 96   CO2 mmol/L 26   BUN mg/dL 12   CREATININE mg/dL 0.69   ANION GAP mmol/L 8   CALCIUM mg/dL 9.2   ALBUMIN g/dL 4.0   TOTAL BILIRUBIN mg/dL 0.81   ALK PHOS U/L 69   ALT U/L 15   AST U/L 13   GLUCOSE RANDOM mg/dL 147*                       Recent Cultures (last 7 days):         Imaging Results Review: I reviewed radiology reports from this admission including: CT abdomen/pelvis.  Other Study Results Review: No additional pertinent studies reviewed.    Last 24 Hours Medication List:     Current Facility-Administered Medications:     acetaminophen (TYLENOL) tablet 650 mg, Q6H PRN    ALPRAZolam (XANAX) tablet 0.25 mg, HS PRN    atorvastatin (LIPITOR) tablet 20 mg, HS    enoxaparin (LOVENOX) subcutaneous injection 40 mg, Daily     HYDROmorphone HCl (DILAUDID) injection 0.2 mg, Q3H PRN    insulin lispro (HumALOG/ADMELOG) 100 units/mL subcutaneous injection 1-5 Units, 4x Daily (AC & HS) **AND** Fingerstick Glucose (POCT), 4x Daily AC and at bedtime    levothyroxine tablet 25 mcg, Early Morning    metoprolol succinate (TOPROL-XL) 24 hr tablet 50 mg, HS    ondansetron (ZOFRAN) injection 4 mg, Once PRN    oxyCODONE (ROXICODONE) split tablet 2.5 mg, Q4H PRN **OR** oxyCODONE (ROXICODONE) IR tablet 5 mg, Q4H PRN    [COMPLETED] piperacillin-tazobactam (ZOSYN) 4.5 g in sodium chloride 0.9 % 100 mL IV LOADING DOSE, Once, Last Rate: 4.5 g (10/12/24 1102) **FOLLOWED BY** piperacillin-tazobactam (ZOSYN) 4.5 g in sodium chloride 0.9 % 100 mL IVPB (EXTENDED INFUSION), Q8H    polyethylene glycol (MIRALAX) packet 17 g, Daily    senna-docusate sodium (SENOKOT S) 8.6-50 mg per tablet 1 tablet, BID    sodium chloride 0.9 % infusion, Continuous, Last Rate: 75 mL/hr (10/12/24 1102)    Administrative Statements   Today, Patient Was Seen By: Analy Hartley PA-C      **Please Note: This note may have been constructed using a voice recognition system.**

## 2024-10-12 NOTE — ED PROCEDURE NOTE
Procedure  ECG 12 Lead Documentation Only    Date/Time: 10/11/2024 12:55 PM    Performed by: Perri Almanzar MD  Authorized by: Perri Almanzar MD    ECG reviewed by me, the ED Provider: yes    Patient location:  ED  Previous ECG:     Previous ECG:  Compared to current    Comparison ECG info:  9/22/2024    Comparison to cardiac monitor: Yes    Rate:     ECG rate:  73    ECG rate assessment: normal    Rhythm:     Rhythm: sinus rhythm    Ectopy:     Ectopy: none    QRS:     QRS axis:  Normal    QRS intervals:  Normal  Conduction:     Conduction: normal    ST segments:     ST segments:  Normal  T waves:     T waves: normal                     Perri Almanzar MD  10/11/24 9235

## 2024-10-12 NOTE — ASSESSMENT & PLAN NOTE
Lab Results   Component Value Date    SODIUM 130 (L) 10/12/2024    SODIUM 137 10/11/2024    SODIUM 136 09/22/2024     S/P 1 L NSS in the ED on arrival and 1 dose of IV toradol  Minimal PO intake  Start IV fluids for maintenance but need to ensure sodium not dropping - check sodium later this afternoon

## 2024-10-13 ENCOUNTER — APPOINTMENT (INPATIENT)
Dept: CT IMAGING | Facility: HOSPITAL | Age: 79
DRG: 391 | End: 2024-10-13
Payer: MEDICARE

## 2024-10-13 PROBLEM — R19.00 PELVIC MASS: Status: ACTIVE | Noted: 2024-10-13

## 2024-10-13 PROBLEM — R65.20 SEVERE SEPSIS (HCC): Status: ACTIVE | Noted: 2024-10-12

## 2024-10-13 LAB
ANION GAP SERPL CALCULATED.3IONS-SCNC: 7 MMOL/L (ref 4–13)
BASOPHILS # BLD AUTO: 0.05 THOUSANDS/ΜL (ref 0–0.1)
BASOPHILS NFR BLD AUTO: 0 % (ref 0–1)
BUN SERPL-MCNC: 10 MG/DL (ref 5–25)
CALCIUM SERPL-MCNC: 9.1 MG/DL (ref 8.4–10.2)
CHLORIDE SERPL-SCNC: 100 MMOL/L (ref 96–108)
CO2 SERPL-SCNC: 26 MMOL/L (ref 21–32)
CREAT SERPL-MCNC: 0.62 MG/DL (ref 0.6–1.3)
EOSINOPHIL # BLD AUTO: 0.01 THOUSAND/ΜL (ref 0–0.61)
EOSINOPHIL NFR BLD AUTO: 0 % (ref 0–6)
ERYTHROCYTE [DISTWIDTH] IN BLOOD BY AUTOMATED COUNT: 13.2 % (ref 11.6–15.1)
GFR SERPL CREATININE-BSD FRML MDRD: 84 ML/MIN/1.73SQ M
GLUCOSE SERPL-MCNC: 104 MG/DL (ref 65–140)
GLUCOSE SERPL-MCNC: 123 MG/DL (ref 65–140)
GLUCOSE SERPL-MCNC: 128 MG/DL (ref 65–140)
GLUCOSE SERPL-MCNC: 128 MG/DL (ref 65–140)
GLUCOSE SERPL-MCNC: 135 MG/DL (ref 65–140)
HCT VFR BLD AUTO: 36.8 % (ref 34.8–46.1)
HGB BLD-MCNC: 12.1 G/DL (ref 11.5–15.4)
IMM GRANULOCYTES # BLD AUTO: 0.19 THOUSAND/UL (ref 0–0.2)
IMM GRANULOCYTES NFR BLD AUTO: 1 % (ref 0–2)
LYMPHOCYTES # BLD AUTO: 0.87 THOUSANDS/ΜL (ref 0.6–4.47)
LYMPHOCYTES NFR BLD AUTO: 4 % (ref 14–44)
MCH RBC QN AUTO: 28.1 PG (ref 26.8–34.3)
MCHC RBC AUTO-ENTMCNC: 32.9 G/DL (ref 31.4–37.4)
MCV RBC AUTO: 85 FL (ref 82–98)
MONOCYTES # BLD AUTO: 1.74 THOUSAND/ΜL (ref 0.17–1.22)
MONOCYTES NFR BLD AUTO: 8 % (ref 4–12)
NEUTROPHILS # BLD AUTO: 17.82 THOUSANDS/ΜL (ref 1.85–7.62)
NEUTS SEG NFR BLD AUTO: 87 % (ref 43–75)
NRBC BLD AUTO-RTO: 0 /100 WBCS
PLATELET # BLD AUTO: 236 THOUSANDS/UL (ref 149–390)
PMV BLD AUTO: 10.1 FL (ref 8.9–12.7)
POTASSIUM SERPL-SCNC: 3.6 MMOL/L (ref 3.5–5.3)
PROCALCITONIN SERPL-MCNC: 0.81 NG/ML
RBC # BLD AUTO: 4.31 MILLION/UL (ref 3.81–5.12)
SODIUM SERPL-SCNC: 133 MMOL/L (ref 135–147)
WBC # BLD AUTO: 20.68 THOUSAND/UL (ref 4.31–10.16)

## 2024-10-13 PROCEDURE — 74177 CT ABD & PELVIS W/CONTRAST: CPT

## 2024-10-13 PROCEDURE — 85025 COMPLETE CBC W/AUTO DIFF WBC: CPT | Performed by: PHYSICIAN ASSISTANT

## 2024-10-13 PROCEDURE — 80048 BASIC METABOLIC PNL TOTAL CA: CPT | Performed by: PHYSICIAN ASSISTANT

## 2024-10-13 PROCEDURE — 99232 SBSQ HOSP IP/OBS MODERATE 35: CPT | Performed by: PHYSICIAN ASSISTANT

## 2024-10-13 PROCEDURE — 84145 PROCALCITONIN (PCT): CPT | Performed by: PHYSICIAN ASSISTANT

## 2024-10-13 PROCEDURE — 99223 1ST HOSP IP/OBS HIGH 75: CPT | Performed by: COLON & RECTAL SURGERY

## 2024-10-13 PROCEDURE — 82948 REAGENT STRIP/BLOOD GLUCOSE: CPT

## 2024-10-13 RX ADMIN — ACETAMINOPHEN 650 MG: 325 TABLET ORAL at 07:28

## 2024-10-13 RX ADMIN — SENNOSIDES AND DOCUSATE SODIUM 1 TABLET: 8.6; 5 TABLET ORAL at 17:14

## 2024-10-13 RX ADMIN — PIPERACILLIN SODIUM AND TAZOBACTAM SODIUM 4.5 G: 36; 4.5 INJECTION, POWDER, LYOPHILIZED, FOR SOLUTION INTRAVENOUS at 06:41

## 2024-10-13 RX ADMIN — METOPROLOL SUCCINATE 50 MG: 50 TABLET, EXTENDED RELEASE ORAL at 22:42

## 2024-10-13 RX ADMIN — SODIUM CHLORIDE 100 ML/HR: 0.9 INJECTION, SOLUTION INTRAVENOUS at 19:52

## 2024-10-13 RX ADMIN — PIPERACILLIN SODIUM AND TAZOBACTAM SODIUM 4.5 G: 36; 4.5 INJECTION, POWDER, LYOPHILIZED, FOR SOLUTION INTRAVENOUS at 23:46

## 2024-10-13 RX ADMIN — SODIUM CHLORIDE 75 ML/HR: 0.9 INJECTION, SOLUTION INTRAVENOUS at 03:40

## 2024-10-13 RX ADMIN — OXYCODONE HYDROCHLORIDE 5 MG: 5 TABLET ORAL at 19:49

## 2024-10-13 RX ADMIN — ENOXAPARIN SODIUM 40 MG: 40 INJECTION SUBCUTANEOUS at 08:51

## 2024-10-13 RX ADMIN — LEVOTHYROXINE SODIUM 25 MCG: 25 TABLET ORAL at 05:28

## 2024-10-13 RX ADMIN — IOHEXOL 75 ML: 350 INJECTION, SOLUTION INTRAVENOUS at 09:47

## 2024-10-13 RX ADMIN — POLYETHYLENE GLYCOL 3350 17 G: 17 POWDER, FOR SOLUTION ORAL at 08:51

## 2024-10-13 RX ADMIN — ATORVASTATIN CALCIUM 20 MG: 20 TABLET, FILM COATED ORAL at 22:42

## 2024-10-13 RX ADMIN — ALPRAZOLAM 0.25 MG: 0.25 TABLET ORAL at 23:43

## 2024-10-13 RX ADMIN — SODIUM CHLORIDE 100 ML/HR: 0.9 INJECTION, SOLUTION INTRAVENOUS at 23:44

## 2024-10-13 RX ADMIN — PIPERACILLIN SODIUM AND TAZOBACTAM SODIUM 4.5 G: 36; 4.5 INJECTION, POWDER, LYOPHILIZED, FOR SOLUTION INTRAVENOUS at 15:54

## 2024-10-13 RX ADMIN — SENNOSIDES AND DOCUSATE SODIUM 1 TABLET: 8.6; 5 TABLET ORAL at 08:51

## 2024-10-13 RX ADMIN — ACETAMINOPHEN 650 MG: 325 TABLET ORAL at 17:14

## 2024-10-13 NOTE — ASSESSMENT & PLAN NOTE
Lab Results   Component Value Date    HGBA1C 6.1 (H) 10/12/2024       Recent Labs     10/13/24  0724 10/13/24  1113 10/13/24  1628 10/13/24  2047   POCGLU 128 123 128 104       Blood Sugar Average: Last 72 hrs:  (P) [id number]

## 2024-10-13 NOTE — CONSULTS
Consultation - Colorectal Surgery  Name: Ama Arteaga 79 y.o. female I MRN: [id number]  Unit/Bed#: W -01 I Date of Admission: 10/11/2024   Date of Service: 10/13/2024 I Hospital Day: 1     Inpatient consult to Colorectal Surgery  Consult performed by: Sydney Mireles MD  Consult ordered by: Analy Hartley PA-C      Physician Requesting Evaluation: Linda Naqvi MD   Reason for Evaluation / Principal Problem: Acute diverticulitis    Assessment & Plan  Diverticulitis  Presenting 10/11 with abdominal pain, nausea  Found to have acute diverticulitis, two previous episodes this year  Admitted to medicine service, originally monitored off abx    Tmax 102.3 yesterday afternoon  Tachycardic to 110s overnight  Zosyn initiated yesterday     WBC 20.7 (19.7, 11.4)  LA yesterday 1.7   Blood cultures pending     10/13 CTAP: diverticulitis, distended sigmoid, pericolonic fat stranding, small amount of pelvic fluid. No definite perforation or obstruction; uterine mass     Plan:   - NPO  - IV fluids, increase rate from 75 while NPO  - Continue IV Zosyn  - Monitor abdominal exam  - Monitor fever curve, WBC   - Follow up pending 10/12 blood cx   - Remainder of care per primary   Controlled type 2 diabetes mellitus with complication, without long-term current use of insulin ([facility])  Lab Results   Component Value Date    HGBA1C 6.1 (H) 10/12/2024       Recent Labs     10/12/24  1601 10/12/24  2056 10/13/24  0724 10/13/24  1113   POCGLU 131 135 128 123       Blood Sugar Average: Last 72 hrs:  (P) 129.25      History of Present Illness   Ama Arteaga is a 79 y.o. female admitted to medicine service with acute diverticulitis. Presented 10/11 with abdominal pain, nausea, fevers. Two previous episodes of diverticulitis this year.  Last colonoscopy in August: Large, extensive, severe diverticula in transverse colon, descending colon, sigmoid colon.  Less than 5 mm sigmoid colon polyp, internal hemorrhoids.      Surgical history of  tubal ligation in approximately 1978.  Denies additional abdominal surgeries.  Has never seen a colorectal or general surgeon before.  Denies anticoagulant or antiplatelet use.  Had been eating a regular diet without restriction prior to presentation to hospital.    Patient reports two days of abdominal pain, nausea, vomiting, fever, weakness, dizziness, shortness of breath.  Denies chest pain, dysuria, hematuria.  Had small bowel movements this morning and yesterday, previous bowel movement on Wednesday.  Denies blood in stool.    Initially monitored off of antibiotics this hospitalization. Tmax 102.3 yesterday afternoon, tachycardic to 110s overnight, Zosyn initiated yesterday. CTAP performed:     10/13 CTAP: diverticulitis, distended sigmoid, pericolonic fat stranding, small amount of pelvic fluid. No definite perforation or obstruction; uterine mass     Review of Systems negative unless stated above    Historical Information   Past Medical History:   Diagnosis Date    Adrenal abnormality (HCC)     See endocrinologist for care-patient unsure of condition    Change in bowel habit     diarrhea-started end of November 2022    Chronic kidney disease     cyst on right kidney    Colon polyp     Diabetes (HCC)     Disease of thyroid gland     Diverticulitis     Dry heaves     hx of    Dysphagia     GERD (gastroesophageal reflux disease)     Hearing loss     wears B/L hearing aides    History of transfusion 1978    Tubal Pregnancy    Hyperlipidemia     Hypertension     Irregular heart beat     last saw Dr. Marshall 2 months ago: denies chest pain, SOB or palpitations per patient  2/22/23    Low back pain     Osteoporosis scoliosis    Skipped heart beats     checked by cardiologist (Dr. Marshall)-was told is no problem October 2021; also had ECHO and stress test    Tubal pregnancy      Past Surgical History:   Procedure Laterality Date    CARPAL TUNNEL RELEASE Bilateral     COLONOSCOPY      ECTOPIC PREGNANCY SURGERY      EGD       EYE SURGERY      Defuction of Right Eye Sac    NEUROPLASTY / TRANSPOSITION MEDIAN NERVE AT CARPAL TUNNEL BILATERAL      OSTEOTOMY TOES Left     2 and 3rd phalanges    OTHER SURGICAL HISTORY      Calcification removed from Right Thumb    ND ARTHRODESIS COMBINED TQ 1NTRSPC LUMBAR N/A 2021    Procedure: Minimally invasive transverse lumbar interbody fusion L4-5 from left-sided approach with decompressive laminectomy;  Surgeon: Stanislav Ervin MD;  Location: [facility];  Service: Neurosurgery     Social History     Tobacco Use    Smoking status: Former     Current packs/day: 0.00     Types: Cigarettes     Quit date:      Years since quittin.8    Smokeless tobacco: Never    Tobacco comments:     quit 40 years ago   Vaping Use    Vaping status: Never Used   Substance and Sexual Activity    Alcohol use: Not Currently    Drug use: Not Currently    Sexual activity: Not on file     E-Cigarette/Vaping    E-Cigarette Use Never User      E-Cigarette/Vaping Substances     Family history non-contributory    Objective :  Temp:  [98 °F (36.7 °C)-102.3 °F (39.1 °C)] 98 °F (36.7 °C)  HR:  [108-119] 108  BP: (116-141)/(62-82) 131/65  Resp:  [15-18] 15  SpO2:  [91 %-93 %] 92 %  O2 Device: None (Room air)      Physical Exam  Vitals reviewed.   Constitutional:       General: She is not in acute distress.  HENT:      Head: Normocephalic and atraumatic.      Mouth/Throat:      Mouth: Mucous membranes are dry.   Eyes:      Extraocular Movements: Extraocular movements intact.   Cardiovascular:      Rate and Rhythm: Normal rate.      Comments: HR 90s  Pulmonary:      Effort: Pulmonary effort is normal. No respiratory distress.   Abdominal:      General: There is distension.      Tenderness: There is abdominal tenderness (Bilateral lower quadrants). There is no rebound.   Skin:     General: Skin is warm and dry.   Neurological:      Mental Status: She is alert and oriented to person, place, and time.         Lab Results: I have  reviewed the following results:  Recent Labs     10/12/24  0442 10/12/24  1444 10/12/24  2215 10/13/24  0459   WBC 19.74*  --   --  20.68*   HGB 12.9  --   --  12.1   HCT 39.3  --   --  36.8     --   --  236   SODIUM 130*   < >  --  133*   K 4.1  --   --  3.6   CL 96  --   --  100   CO2 26  --   --  26   BUN 12  --   --  10   CREATININE 0.69  --   --  0.62   GLUC 147*  --   --  135   AST 13  --   --   --    ALT 15  --   --   --    ALB 4.0  --   --   --    TBILI 0.81  --   --   --    ALKPHOS 69  --   --   --    LACTICACID  --    < > 1.7  --     < > = values in this interval not displayed.     CT abdomen pelvis w contrast   Final Result by Jimenez Wen MD (10/13 1021)      There is progression of pericolonic fat stranding and development of a small amount of fluid in the pelvis since the prior study. The findings may represent progression of colitis or diverticulitis. Redemonstration of a stool distended sigmoid colon.      Incidental 2.9 cm mass in the region of the lower uterine segment. Would recommend a nonemergent pelvic ultrasound for better evaluation purposes.      The study was marked in EPIC for immediate notification.         Workstation performed: [id number]         CT abdomen pelvis with contrast   Final Result by Manfred Min MD (10/11 5189)      1.  There is new mild diverticulitis within the more distal sigmoid colon, along with underlying constipation. Please refer to the report body for description of other incidental chronic and/or benign findings.         Workstation performed: [id number]           VTE Pharmacologic Prophylaxis: VTE covered by:  enoxaparin, Subcutaneous, 40 mg at 10/13/24 0873     VTE Mechanical Prophylaxis: sequential compression device    ---  Sydney Mireles MD  General Surgery PGY-II

## 2024-10-13 NOTE — ASSESSMENT & PLAN NOTE
Presenting 10/11 with abdominal pain, nausea  Found to have acute diverticulitis, two previous episodes this year  Admitted to medicine service, originally monitored off abx    Tmax 102.3 yesterday afternoon  Tachycardic to 110s overnight  Zosyn initiated yesterday     WBC 20.7 (19.7, 11.4)  LA yesterday 1.7   Blood cultures pending     10/13 CTAP: diverticulitis, distended sigmoid, pericolonic fat stranding, small amount of pelvic fluid. No definite perforation or obstruction; uterine mass     Plan:   - NPO  - IV fluids, increase rate from 75 while NPO  - Continue IV Zosyn  - Monitor abdominal exam  - Monitor fever curve, WBC   - Follow up pending 10/12 blood cx   - Remainder of care per primary    Itraconazole Counseling:  I discussed with the patient the risks of itraconazole including but not limited to liver damage, nausea/vomiting, neuropathy, and severe allergy.  The patient understands that this medication is best absorbed when taken with acidic beverages such as non-diet cola or ginger ale.  The patient understands that monitoring is required including baseline LFTs and repeat LFTs at intervals.  The patient understands that they are to contact us or the primary physician if concerning signs are noted.

## 2024-10-13 NOTE — PROGRESS NOTES
Progress Note - Hospitalist   Name: Ama Arteaga 79 y.o. female I MRN: [id number]  Unit/Bed#: W -01 I Date of Admission: 10/11/2024   Date of Service: 10/13/2024 I Hospital Day: 1    Assessment & Plan  Diverticulitis  CT: There is new mild diverticulitis within the more distal sigmoid colon, along with underlying constipation. Please refer to the report body for description of other incidental chronic and/or benign findings.   First episode of diverticulitis - seen in ED 6/2024, discharged home on augmentin. Second episode - seen in ED 9/22/24, discharged home off ABX.  Colonoscopy 8/2024: Extensive diverticulosis of severe severity in the transverse colon, descending colon and sigmoid colon   Last BM 10/12 (hard small amount). Prior to that was Wednesday.   Was withheld from antibiotics on admission secondary to only minimal elevation in white count and mild symptoms.  However since that time developed fever, significant elevated white count and continued pain and therefore IV Zosyn started 10/12.  WBC remains 20, recurrent fevers, new abd distention - STAT CT with progression of diverticulitis and fluid in pelvis.  Consult colorectal surgery  Continue IV zosyn at this time. Follow temp/WBC  NPO except ice chips pending colorectal eval and to allow bowel rest except sips with meds  Nutrition consult for dietary recommendations for ongoing education  Miralax, senna, colace  Continue IV fluids  Severe sepsis (HCC)  Patient met septic criteria given WBC 19, fever and tachycardia  Lactic acid 2.4 - thus met severe sepsis on 10/12  Lactic acid cleared  No fluid boluses indicated but did receive maintenance fluids  Blood cultures obtained and pending  LFTS WNL  BP stable  Continue IV zosyn and monitor temp/WBC closely  Hyponatremia  Lab Results   Component Value Date    SODIUM 133 (L) 10/13/2024    SODIUM 132 (L) 10/12/2024    SODIUM 130 (L) 10/12/2024     S/P 1 L NSS in the ED on arrival and 1 dose of IV  "toradol  Minimal PO intake since admission  Continue IV fluids and monitor Na level  Hypertension  Continue metoprolol  SBP stable 130s  Controlled type 2 diabetes mellitus with complication, without long-term current use of insulin (HCC)  Lab Results   Component Value Date    HGBA1C 6.1 (H) 10/12/2024       Recent Labs     10/12/24  1601 10/12/24  2056 10/13/24  0724 10/13/24  1113   POCGLU 131 135 128 123       Blood Sugar Average: Last 72 hrs:  (P) 129.25  Metformin use only  SSI ACHS at this time - if no needs will discontinue on 10/14  Pelvic mass  CT: Incidental 2.9 cm mass in the region of the lower uterine segment.   Nonemergent pelvic ultrasound for better evaluation - fibroid vs malignancy  Not seeing GYN as outpt any longer given age  Patient/daughter made aware and will follow up as outpt for this.     VTE Pharmacologic Prophylaxis: VTE Score: 6 High Risk (Score >/= 5) - Pharmacological DVT Prophylaxis Ordered: enoxaparin (Lovenox). Sequential Compression Devices Ordered.    Mobility:   Basic Mobility Inpatient Raw Score: 23  JH-HLM Goal: 7: Walk 25 feet or more  JH-HLM Achieved: 7: Walk 25 feet or more  JH-HLM Goal achieved. Continue to encourage appropriate mobility.    Patient Centered Rounds: I performed bedside rounds with nursing staff today.   Discussions with Specialists or Other Care Team Provider: colorectal    Education and Discussions with Family / Patient: Updated  (daughter) at bedside.    Current Length of Stay: 1 day(s)  Current Patient Status: Inpatient   Certification Statement: The patient will continue to require additional inpatient hospital stay due to sepsis, diverticulitis  Discharge Plan: Anticipate discharge in 48-72 hrs to home.    Code Status: Level 1 - Full Code    Subjective   Had hard BM yesterday  Abd feels \"bloated\" today  Ate some eggs and vomited them up this AM  Worried about her HR  Abd still painful - LLQ worst, then RLQ    Objective :  Temp:  [98 °F " (36.7 °C)-102.3 °F (39.1 °C)] 98 °F (36.7 °C)  HR:  [108-119] 108  BP: (116-141)/(62-82) 131/65  Resp:  [15-18] 15  SpO2:  [91 %-93 %] 92 %  O2 Device: None (Room air)    Body mass index is 27.69 kg/m².     Input and Output Summary (last 24 hours):     Intake/Output Summary (Last 24 hours) at 10/13/2024 1219  Last data filed at 10/13/2024 0902  Gross per 24 hour   Intake 1220 ml   Output --   Net 1220 ml       Physical Exam  Vitals and nursing note reviewed.   Constitutional:       General: She is not in acute distress.     Appearance: Normal appearance. She is not ill-appearing or diaphoretic.   HENT:      Head: Normocephalic and atraumatic.      Mouth/Throat:      Mouth: Mucous membranes are dry.   Cardiovascular:      Rate and Rhythm: Normal rate and regular rhythm.   Pulmonary:      Effort: Pulmonary effort is normal.      Breath sounds: Normal breath sounds. No wheezing or rales.   Abdominal:      General: Bowel sounds are normal. There is distension.      Palpations: Abdomen is soft. There is no mass.      Tenderness: There is abdominal tenderness (LLQ, RLQ. no epigastric, RUQ, LUQ tenderness.). There is no guarding or rebound.   Musculoskeletal:      Right lower leg: No edema.      Left lower leg: No edema.   Skin:     General: Skin is warm and dry.   Neurological:      Mental Status: She is alert.      Comments: Hard of hearing   Psychiatric:         Mood and Affect: Mood normal.         Behavior: Behavior normal.           Lines/Drains:              Lab Results: I have reviewed the following results:   Results from last 7 days   Lab Units 10/13/24  0459   WBC Thousand/uL 20.68*   HEMOGLOBIN g/dL 12.1   HEMATOCRIT % 36.8   PLATELETS Thousands/uL 236   SEGS PCT % 87*   LYMPHO PCT % 4*   MONO PCT % 8   EOS PCT % 0     Results from last 7 days   Lab Units 10/13/24  0459 10/12/24  1444 10/12/24  0442   SODIUM mmol/L 133*   < > 130*   POTASSIUM mmol/L 3.6  --  4.1   CHLORIDE mmol/L 100  --  96   CO2 mmol/L 26  --   26   BUN mg/dL 10  --  12   CREATININE mg/dL 0.62  --  0.69   ANION GAP mmol/L 7  --  8   CALCIUM mg/dL 9.1  --  9.2   ALBUMIN g/dL  --   --  4.0   TOTAL BILIRUBIN mg/dL  --   --  0.81   ALK PHOS U/L  --   --  69   ALT U/L  --   --  15   AST U/L  --   --  13   GLUCOSE RANDOM mg/dL 135  --  147*    < > = values in this interval not displayed.         Results from last 7 days   Lab Units 10/13/24  1113 10/13/24  0724 10/12/24  2056 10/12/24  1601   POC GLUCOSE mg/dl 123 128 135 131     Results from last 7 days   Lab Units 10/12/24  0442   HEMOGLOBIN A1C % 6.1*     Results from last 7 days   Lab Units 10/13/24  0459 10/12/24  2215 10/12/24  1948 10/12/24  1728 10/12/24  1444   LACTIC ACID mmol/L  --  1.7 2.1* 2.1* 2.4*   PROCALCITONIN ng/ml 0.81*  --   --   --  0.43*       Recent Cultures (last 7 days):   Results from last 7 days   Lab Units 10/12/24  1443   BLOOD CULTURE  Received in Microbiology Lab. Culture in Progress.  Received in Microbiology Lab. Culture in Progress.       Imaging Results Review: I reviewed radiology reports from this admission including: CT abdomen/pelvis.  Other Study Results Review: No additional pertinent studies reviewed.    Last 24 Hours Medication List:     Current Facility-Administered Medications:     acetaminophen (TYLENOL) tablet 650 mg, Q6H PRN    ALPRAZolam (XANAX) tablet 0.25 mg, HS PRN    atorvastatin (LIPITOR) tablet 20 mg, HS    enoxaparin (LOVENOX) subcutaneous injection 40 mg, Daily    HYDROmorphone HCl (DILAUDID) injection 0.2 mg, Q3H PRN    insulin lispro (HumALOG/ADMELOG) 100 units/mL subcutaneous injection 1-5 Units, 4x Daily (AC & HS) **AND** Fingerstick Glucose (POCT), 4x Daily AC and at bedtime    levothyroxine tablet 25 mcg, Early Morning    metoprolol succinate (TOPROL-XL) 24 hr tablet 50 mg, HS    ondansetron (ZOFRAN) injection 4 mg, Once PRN    oxyCODONE (ROXICODONE) split tablet 2.5 mg, Q4H PRN **OR** oxyCODONE (ROXICODONE) IR tablet 5 mg, Q4H PRN    [COMPLETED]  piperacillin-tazobactam (ZOSYN) 4.5 g in sodium chloride 0.9 % 100 mL IV LOADING DOSE, Once, Last Rate: Stopped (10/12/24 2047) **FOLLOWED BY** piperacillin-tazobactam (ZOSYN) 4.5 g in sodium chloride 0.9 % 100 mL IVPB (EXTENDED INFUSION), Q8H, Last Rate: 4.5 g (10/13/24 0641)    polyethylene glycol (MIRALAX) packet 17 g, Daily    senna-docusate sodium (SENOKOT S) 8.6-50 mg per tablet 1 tablet, BID    sodium chloride 0.9 % infusion, Continuous, Last Rate: 75 mL/hr (10/13/24 0340)    Administrative Statements   Today, Patient Was Seen By: Analy Hartley PA-C      **Please Note: This note may have been constructed using a voice recognition system.**

## 2024-10-13 NOTE — ASSESSMENT & PLAN NOTE
Lab Results   Component Value Date    HGBA1C 6.1 (H) 10/12/2024       Recent Labs     10/12/24  1601 10/12/24  2056 10/13/24  0724 10/13/24  1113   POCGLU 131 135 128 123       Blood Sugar Average: Last 72 hrs:  (P) 129.25  Metformin use only  SSI ACHS at this time - if no needs will discontinue on 10/14

## 2024-10-13 NOTE — ASSESSMENT & PLAN NOTE
CT: Incidental 2.9 cm mass in the region of the lower uterine segment.   Nonemergent pelvic ultrasound for better evaluation - fibroid vs malignancy  Not seeing GYN as outpt any longer given age  Patient/daughter made aware and will follow up as outpt for this.

## 2024-10-13 NOTE — ASSESSMENT & PLAN NOTE
Patient met septic criteria given WBC 19, fever and tachycardia  Lactic acid 2.4 - thus met severe sepsis on 10/12  Lactic acid cleared  No fluid boluses indicated but did receive maintenance fluids  Blood cultures obtained and pending  LFTS WNL  BP stable  Continue IV zosyn and monitor temp/WBC closely

## 2024-10-13 NOTE — CASE MANAGEMENT
Case Management Assessment & Discharge Planning Note    Patient name Ama Alcala W /W -01 MRN [id number]  : 1945 Date 10/13/2024       Current Admission Date: 10/11/2024  Current Admission Diagnosis:Diverticulitis   Patient Active Problem List    Diagnosis Date Noted Date Diagnosed    Pelvic mass 10/13/2024     Severe sepsis (HCC) 10/12/2024     Hyponatremia 10/12/2024     Diverticulitis 10/11/2024     Hemorrhagic cystitis 2024     Controlled type 2 diabetes mellitus with complication, without long-term current use of insulin (HCC) 2024     Nontraumatic tear of right rotator cuff 2024     Encounter for postoperative care related to surgical joint fusion 2021     Drug-induced constipation 2021     Intractable nausea and vomiting 2021     History of back surgery 2021     Hypokalemia 2021     Hypertension      Lower back pain 2021     Spondylolisthesis of lumbar region 2021     Spinal stenosis at L4-L5 level 2021     Neurogenic claudication due to lumbar spinal stenosis 2021     Scoliosis deformity of spine 2021     Chronic right shoulder pain 2021       LOS (days): 1  Geometric Mean LOS (GMLOS) (days):   Days to GMLOS:     OBJECTIVE:    Risk of Unplanned Readmission Score: 14.97         Current admission status: Inpatient       Preferred Pharmacy:   [facility] [address] [place] [id number]  Phone: [phone] Fax: [phone]    Primary Care Provider: Bret Fan MD    Primary Insurance: MEDICARE  Secondary Insurance: AETNA    ASSESSMENT:  Active Health Care Proxies    There are no active Health Care Proxies on file.                      Patient Information  Admitted from:: Home  Mental Status: Alert  During Assessment patient was accompanied by: Daughter  Assessment information provided by:: Patient  Primary Caregiver: Self  Support  Systems: Self, Children  County of Residence: [place]  What city do you live in?: [place]  Home entry access options. Select all that apply.: No steps to enter home (has 2 steps if going through the front door, no steps through garage which she usually uses)  Type of Current Residence: [facility]  Living Arrangements: Lives Alone  Is patient a ?: No    Activities of Daily Living Prior to Admission  Functional Status: Independent  Completes ADLs independently?: Yes  Ambulates independently?: Yes  Does patient use assisted devices?: No  Does patient currently own DME?: Yes  What DME does the patient currently own?: Walker, Wheelchair, Bedside Commode  Does patient have a history of Outpatient Therapy (PT/OT)?: No  Does the patient have a history of Short-Term Rehab?: No  Does patient have a history of HHC?: No  Does patient currently have HHC?: No         Patient Information Continued  Income Source: Pension/correction  Does patient have prescription coverage?: Yes  Does patient receive dialysis treatments?: No  Does patient have a history of substance abuse?: No  Does patient have a history of Mental Health Diagnosis?: No         Means of Transportation  Means of Transport to Appts:: Drives Self      Social Determinants of Health (SDOH)      Flowsheet Row Most Recent Value   Housing Stability    In the last 12 months, was there a time when you were not able to pay the mortgage or rent on time? N   At any time in the past 12 months, were you homeless or living in a shelter (including now)? N   Transportation Needs    In the past 12 months, has lack of transportation kept you from medical appointments or from getting medications? no   In the past 12 months, has lack of transportation kept you from meetings, work, or from getting things needed for daily living? No   Food Insecurity    Within the past 12 months, you worried that your food would run out before you got the money to buy more. Never true   Within the  past 12 months, the food you bought just didn't last and you didn't have money to get more. Never true   Utilities    In the past 12 months has the electric, gas, oil, or water company threatened to shut off services in your home? No            DISCHARGE DETAILS:    Discharge planning discussed with:: Patient and daugher at bedside  Freedom of Choice: Yes  Comments - Freedom of Choice: Cm met with patient and daughter at bedside. Patient from home and independent at baseline. Her children are at the home during the day if any assistance is needed. She does not use DME but has a walker, wheel chair, and bsc if needed. She does not anticipate any needs at this time.  CM contacted family/caregiver?: Yes  Were Treatment Team discharge recommendations reviewed with patient/caregiver?: Yes  Did patient/caregiver verbalize understanding of patient care needs?: Yes  Were patient/caregiver advised of the risks associated with not following Treatment Team discharge recommendations?: Yes    Contacts  Patient Contacts: Danay- Daughter  Relationship to Patient:: Family  Contact Method: In Person  Reason/Outcome: Discharge Planning    Requested Home Health Care         Is the patient interested in HHC at discharge?: No    DME Referral Provided  Referral made for DME?: No

## 2024-10-13 NOTE — PLAN OF CARE
Problem: PAIN - ADULT  Goal: Verbalizes/displays adequate comfort level or baseline comfort level  Description: Interventions:  - Encourage patient to monitor pain and request assistance  - Assess pain using appropriate pain scale  - Administer analgesics based on type and severity of pain and evaluate response  - Implement non-pharmacological measures as appropriate and evaluate response  - Consider cultural and social influences on pain and pain management  - Notify physician/advanced practitioner if interventions unsuccessful or patient reports new pain  Outcome: Progressing     Problem: INFECTION - ADULT  Goal: Absence or prevention of progression during hospitalization  Description: INTERVENTIONS:  - Assess and monitor for signs and symptoms of infection  - Monitor lab/diagnostic results  - Monitor all insertion sites, i.e. indwelling lines, tubes, and drains  - Monitor endotracheal if appropriate and nasal secretions for changes in amount and color  - [place] appropriate cooling/warming therapies per order  - Administer medications as ordered  - Instruct and encourage patient and family to use good hand hygiene technique  - Identify and instruct in appropriate isolation precautions for identified infection/condition  Outcome: Progressing  Goal: Absence of fever/infection during neutropenic period  Description: INTERVENTIONS:  - Monitor WBC    Outcome: Progressing     Problem: SAFETY ADULT  Goal: Patient will remain free of falls  Description: INTERVENTIONS:  - Educate patient/family on patient safety including physical limitations  - Instruct patient to call for assistance with activity   - Consult OT/PT to assist with strengthening/mobility   - Keep Call bell within reach  - Keep bed low and locked with side rails adjusted as appropriate  - Keep care items and personal belongings within reach  - Initiate and maintain comfort rounds  - Make Fall Risk Sign visible to staff  - Offer Toileting every  Hours,  in advance of need  - Initiate/Maintain alarm  - Obtain necessary fall risk management equipment:   - Apply yellow socks and bracelet for high fall risk patients  - Consider moving patient to room near nurses station  Outcome: Progressing  Goal: Maintain or return to baseline ADL function  Description: INTERVENTIONS:  -  Assess patient's ability to carry out ADLs; assess patient's baseline for ADL function and identify physical deficits which impact ability to perform ADLs (bathing, care of mouth/teeth, toileting, grooming, dressing, etc.)  - Assess/evaluate cause of self-care deficits   - Assess range of motion  - Assess patient's mobility; develop plan if impaired  - Assess patient's need for assistive devices and provide as appropriate  - Encourage maximum independence but intervene and supervise when necessary  - Involve family in performance of ADLs  - Assess for home care needs following discharge   - Consider OT consult to assist with ADL evaluation and planning for discharge  - Provide patient education as appropriate  Outcome: Progressing  Goal: Maintains/Returns to pre admission functional level  Description: INTERVENTIONS:  - Perform AM-PAC 6 Click Basic Mobility/ Daily Activity assessment daily.  - Set and communicate daily mobility goal to care team and patient/family/caregiver.   - Collaborate with rehabilitation services on mobility goals if consulted  - Perform Range of Motion  times a day.  - Reposition patient every  hours.  - Dangle patient  times a day  - Stand patient  times a day  - Ambulate patient  times a day  - Out of bed to chair  times a day   - Out of bed for meals 3 times a day  - Out of bed for toileting  - Record patient progress and toleration of activity level   Outcome: Progressing     Problem: DISCHARGE PLANNING  Goal: Discharge to home or other facility with appropriate resources  Description: INTERVENTIONS:  - Identify barriers to discharge w/patient and caregiver  - Arrange for  needed discharge resources and transportation as appropriate  - Identify discharge learning needs (meds, wound care, etc.)  - Arrange for interpretive services to assist at discharge as needed  - Refer to Case Management Department for coordinating discharge planning if the patient needs post-hospital services based on physician/advanced practitioner order or complex needs related to functional status, cognitive ability, or social support system  Outcome: Progressing

## 2024-10-13 NOTE — ASSESSMENT & PLAN NOTE
Lab Results   Component Value Date    SODIUM 133 (L) 10/13/2024    SODIUM 132 (L) 10/12/2024    SODIUM 130 (L) 10/12/2024     S/P 1 L NSS in the ED on arrival and 1 dose of IV toradol  Minimal PO intake since admission  Continue IV fluids and monitor Na level

## 2024-10-13 NOTE — ASSESSMENT & PLAN NOTE
Presenting 10/11 with abdominal pain, nausea  Found to have acute diverticulitis, two previous episodes this year  Admitted to medicine service, originally monitored off abx    Tmax 102.6    WBC 15 from 20.7 (19.7, 11.4)    10/13 CTAP: diverticulitis, distended sigmoid, pericolonic fat stranding, small amount of pelvic fluid. No definite perforation or obstruction; uterine mass     Plan:   - Remain NPO  - IV fluid hydration  - Continue IV Zosyn  - Monitor abdominal exam  - Monitor fever curve, WBC   - Follow up pending 10/12 blood cx; NG 24h  - Remainder of care per primary

## 2024-10-13 NOTE — ASSESSMENT & PLAN NOTE
CT: There is new mild diverticulitis within the more distal sigmoid colon, along with underlying constipation. Please refer to the report body for description of other incidental chronic and/or benign findings.   First episode of diverticulitis - seen in ED 6/2024, discharged home on augmentin. Second episode - seen in ED 9/22/24, discharged home off ABX.  Colonoscopy 8/2024: Extensive diverticulosis of severe severity in the transverse colon, descending colon and sigmoid colon   Last BM 10/12 (hard small amount). Prior to that was Wednesday.   Was withheld from antibiotics on admission secondary to only minimal elevation in white count and mild symptoms.  However since that time developed fever, significant elevated white count and continued pain and therefore IV Zosyn started 10/12.  WBC remains 20, recurrent fevers, new abd distention - STAT CT with progression of diverticulitis and fluid in pelvis.  Consult colorectal surgery  Continue IV zosyn at this time. Follow temp/WBC  NPO except ice chips pending colorectal eval and to allow bowel rest except sips with meds  Nutrition consult for dietary recommendations for ongoing education  Miralax, senna, colace  Continue IV fluids

## 2024-10-13 NOTE — ASSESSMENT & PLAN NOTE
Lab Results   Component Value Date    HGBA1C 6.1 (H) 10/12/2024       Recent Labs     10/12/24  1601 10/12/24  2056 10/13/24  0724 10/13/24  1113   POCGLU 131 135 128 123       Blood Sugar Average: Last 72 hrs:  (P) 129.25

## 2024-10-13 NOTE — PROGRESS NOTES
Progress Note - Colorectal Surgery  Name: Ama Arteaga 79 y.o. female I MRN: [id number]  Unit/Bed#: W -01 I Date of Admission: 10/11/2024   Date of Service: 10/14/2024 I Hospital Day: 2    Assessment & Plan  Diverticulitis  Presenting 10/11 with abdominal pain, nausea  Found to have acute diverticulitis, two previous episodes this year  Admitted to medicine service, originally monitored off abx    Tmax 102.6    WBC 15 from 20.7 (19.7, 11.4)    10/13 CTAP: diverticulitis, distended sigmoid, pericolonic fat stranding, small amount of pelvic fluid. No definite perforation or obstruction; uterine mass     Plan:   - Remain NPO  - IV fluid hydration  - Continue IV Zosyn  - Monitor abdominal exam  - Monitor fever curve, WBC   - Follow up pending 10/12 blood cx; NG 24h  - Remainder of care per primary   Controlled type 2 diabetes mellitus with complication, without long-term current use of insulin ([facility])  Lab Results   Component Value Date    HGBA1C 6.1 (H) 10/12/2024       Recent Labs     10/13/24  0724 10/13/24  1113 10/13/24  1628 10/13/24  2047   POCGLU 128 123 128 104       Blood Sugar Average: Last 72 hrs:  (P) 124.4286139139361426      Subjective   Patient denies pain at rest.  Denies nausea, vomiting, fever, chills, shortness of breath, chest pain.  Voiding.  Had nonbloody bowel movement.    Objective :  Temp:  [98 °F (36.7 °C)-102.6 °F (39.2 °C)] 99.9 °F (37.7 °C)  HR:  [] 96  BP: (131-141)/(65-82) 132/70  Resp:  [14-16] 16  SpO2:  [90 %-94 %] 94 %  O2 Device: None (Room air)    I/O         10/11 0701  10/12 0700 10/12 0701  10/13 0700 10/13 0701  10/14 0700    P.O. 240 810 500    IV Piggyback 1100      Total Intake(mL/kg) 1340 (19.5) 810 (11.8) 500 (7.3)    Net +1340 +810 +500           Unmeasured Urine Occurrence 1 x 1 x             Physical Exam  Gen: NAD, Resting in bed  Neuro: A&O  Head: Normal Cephalic, Atraumatic  Eye: EOMI, No scleral icterus  CV: Regular rate, Cap refill <2 sec  Pulm: Normal  work of breathing, No respiratory distress  Abd: Soft, less distended, tender bilateral lower quadrants  Ext: No edema bilateral lower extremities, Non-tender  Skin: Warm, Dry, Intact      Lab Results: I have reviewed the following results:  Recent Labs     10/12/24  2215 10/13/24  0459 10/14/24  0450   WBC  --    < > 14.94*   HGB  --    < > 11.1*   HCT  --    < > 34.3*   PLT  --    < > 209   SODIUM  --    < > 138   K  --    < > 3.5   CL  --    < > 106   CO2  --    < > 25   BUN  --    < > 9   CREATININE  --    < > 0.54*   GLUC  --    < > 103   MG  --   --  2.0   AST  --   --  12*   ALT  --   --  12   ALB  --   --  3.0*   TBILI  --   --  0.73   ALKPHOS  --   --  69   LACTICACID 1.7  --   --     < > = values in this interval not displayed.     CT abdomen pelvis w contrast   Final Result by Jimenez Wen MD (10/13 1021)      There is progression of pericolonic fat stranding and development of a small amount of fluid in the pelvis since the prior study. The findings may represent progression of colitis or diverticulitis. Redemonstration of a stool distended sigmoid colon.      Incidental 2.9 cm mass in the region of the lower uterine segment. Would recommend a nonemergent pelvic ultrasound for better evaluation purposes.      The study was marked in EPIC for immediate notification.         Workstation performed: [id number]         CT abdomen pelvis with contrast   Final Result by Manfred Min MD (10/11 4202)      1.  There is new mild diverticulitis within the more distal sigmoid colon, along with underlying constipation. Please refer to the report body for description of other incidental chronic and/or benign findings.         Workstation performed: [id number]           VTE Pharmacologic Prophylaxis: VTE covered by:  enoxaparin, Subcutaneous, 40 mg at 10/13/24 0854      VTE Mechanical Prophylaxis: sequential compression device    ---  Sydney Mireles MD  General Surgery PGY-II

## 2024-10-14 LAB
ALBUMIN SERPL BCG-MCNC: 3 G/DL (ref 3.5–5)
ALP SERPL-CCNC: 69 U/L (ref 34–104)
ALT SERPL W P-5'-P-CCNC: 12 U/L (ref 7–52)
ANION GAP SERPL CALCULATED.3IONS-SCNC: 7 MMOL/L (ref 4–13)
AST SERPL W P-5'-P-CCNC: 12 U/L (ref 13–39)
BASOPHILS # BLD AUTO: 0.03 THOUSANDS/ΜL (ref 0–0.1)
BASOPHILS NFR BLD AUTO: 0 % (ref 0–1)
BILIRUB SERPL-MCNC: 0.73 MG/DL (ref 0.2–1)
BUN SERPL-MCNC: 9 MG/DL (ref 5–25)
CALCIUM ALBUM COR SERPL-MCNC: 9.6 MG/DL (ref 8.3–10.1)
CALCIUM SERPL-MCNC: 8.8 MG/DL (ref 8.4–10.2)
CHLORIDE SERPL-SCNC: 106 MMOL/L (ref 96–108)
CO2 SERPL-SCNC: 25 MMOL/L (ref 21–32)
CREAT SERPL-MCNC: 0.54 MG/DL (ref 0.6–1.3)
EOSINOPHIL # BLD AUTO: 0.16 THOUSAND/ΜL (ref 0–0.61)
EOSINOPHIL NFR BLD AUTO: 1 % (ref 0–6)
ERYTHROCYTE [DISTWIDTH] IN BLOOD BY AUTOMATED COUNT: 13.2 % (ref 11.6–15.1)
GFR SERPL CREATININE-BSD FRML MDRD: 88 ML/MIN/1.73SQ M
GLUCOSE SERPL-MCNC: 103 MG/DL (ref 65–140)
GLUCOSE SERPL-MCNC: 120 MG/DL (ref 65–140)
GLUCOSE SERPL-MCNC: 69 MG/DL (ref 65–140)
GLUCOSE SERPL-MCNC: 79 MG/DL (ref 65–140)
GLUCOSE SERPL-MCNC: 81 MG/DL (ref 65–140)
GLUCOSE SERPL-MCNC: 98 MG/DL (ref 65–140)
HCT VFR BLD AUTO: 34.3 % (ref 34.8–46.1)
HGB BLD-MCNC: 11.1 G/DL (ref 11.5–15.4)
IMM GRANULOCYTES # BLD AUTO: 0.11 THOUSAND/UL (ref 0–0.2)
IMM GRANULOCYTES NFR BLD AUTO: 1 % (ref 0–2)
LYMPHOCYTES # BLD AUTO: 0.89 THOUSANDS/ΜL (ref 0.6–4.47)
LYMPHOCYTES NFR BLD AUTO: 6 % (ref 14–44)
MAGNESIUM SERPL-MCNC: 2 MG/DL (ref 1.9–2.7)
MCH RBC QN AUTO: 28.1 PG (ref 26.8–34.3)
MCHC RBC AUTO-ENTMCNC: 32.4 G/DL (ref 31.4–37.4)
MCV RBC AUTO: 87 FL (ref 82–98)
MONOCYTES # BLD AUTO: 1.04 THOUSAND/ΜL (ref 0.17–1.22)
MONOCYTES NFR BLD AUTO: 7 % (ref 4–12)
NEUTROPHILS # BLD AUTO: 12.71 THOUSANDS/ΜL (ref 1.85–7.62)
NEUTS SEG NFR BLD AUTO: 85 % (ref 43–75)
NRBC BLD AUTO-RTO: 0 /100 WBCS
PLATELET # BLD AUTO: 209 THOUSANDS/UL (ref 149–390)
PMV BLD AUTO: 10 FL (ref 8.9–12.7)
POTASSIUM SERPL-SCNC: 3.5 MMOL/L (ref 3.5–5.3)
PROT SERPL-MCNC: 5.7 G/DL (ref 6.4–8.4)
RBC # BLD AUTO: 3.95 MILLION/UL (ref 3.81–5.12)
SODIUM SERPL-SCNC: 138 MMOL/L (ref 135–147)
WBC # BLD AUTO: 14.94 THOUSAND/UL (ref 4.31–10.16)

## 2024-10-14 PROCEDURE — 99232 SBSQ HOSP IP/OBS MODERATE 35: CPT | Performed by: PHYSICIAN ASSISTANT

## 2024-10-14 PROCEDURE — 85025 COMPLETE CBC W/AUTO DIFF WBC: CPT | Performed by: PHYSICIAN ASSISTANT

## 2024-10-14 PROCEDURE — 83735 ASSAY OF MAGNESIUM: CPT | Performed by: PHYSICIAN ASSISTANT

## 2024-10-14 PROCEDURE — 80053 COMPREHEN METABOLIC PANEL: CPT | Performed by: PHYSICIAN ASSISTANT

## 2024-10-14 PROCEDURE — 99232 SBSQ HOSP IP/OBS MODERATE 35: CPT | Performed by: COLON & RECTAL SURGERY

## 2024-10-14 PROCEDURE — 82948 REAGENT STRIP/BLOOD GLUCOSE: CPT

## 2024-10-14 RX ORDER — DEXTROSE MONOHYDRATE 25 G/50ML
25 INJECTION, SOLUTION INTRAVENOUS ONCE
Status: COMPLETED | OUTPATIENT
Start: 2024-10-14 | End: 2024-10-14

## 2024-10-14 RX ADMIN — PIPERACILLIN SODIUM AND TAZOBACTAM SODIUM 4.5 G: 36; 4.5 INJECTION, POWDER, LYOPHILIZED, FOR SOLUTION INTRAVENOUS at 17:08

## 2024-10-14 RX ADMIN — ATORVASTATIN CALCIUM 20 MG: 20 TABLET, FILM COATED ORAL at 21:35

## 2024-10-14 RX ADMIN — ALPRAZOLAM 0.25 MG: 0.25 TABLET ORAL at 23:59

## 2024-10-14 RX ADMIN — SODIUM CHLORIDE 100 ML/HR: 0.9 INJECTION, SOLUTION INTRAVENOUS at 15:32

## 2024-10-14 RX ADMIN — PIPERACILLIN SODIUM AND TAZOBACTAM SODIUM 4.5 G: 36; 4.5 INJECTION, POWDER, LYOPHILIZED, FOR SOLUTION INTRAVENOUS at 23:56

## 2024-10-14 RX ADMIN — DEXTROSE MONOHYDRATE 25 ML: 25 INJECTION, SOLUTION INTRAVENOUS at 21:36

## 2024-10-14 RX ADMIN — LEVOTHYROXINE SODIUM 25 MCG: 25 TABLET ORAL at 05:00

## 2024-10-14 RX ADMIN — SODIUM CHLORIDE 100 ML/HR: 0.9 INJECTION, SOLUTION INTRAVENOUS at 05:00

## 2024-10-14 RX ADMIN — METOPROLOL SUCCINATE 50 MG: 50 TABLET, EXTENDED RELEASE ORAL at 21:35

## 2024-10-14 RX ADMIN — PIPERACILLIN SODIUM AND TAZOBACTAM SODIUM 4.5 G: 36; 4.5 INJECTION, POWDER, LYOPHILIZED, FOR SOLUTION INTRAVENOUS at 08:35

## 2024-10-14 RX ADMIN — ENOXAPARIN SODIUM 40 MG: 40 INJECTION SUBCUTANEOUS at 08:35

## 2024-10-14 NOTE — PROGRESS NOTES
Progress Note - Surgery-General   Name: Ama Arteaga 79 y.o. female I MRN: [id number]  Unit/Bed#: W -01 I Date of Admission: 10/11/2024   Date of Service: 10/14/2024 I Hospital Day: 2    Assessment & Plan  Diverticulitis  Presenting 10/11 with abdominal pain, nausea  Found to have acute diverticulitis, two previous episodes this year  Admitted to medicine service, originally monitored off abx.   10/13 CTAP: diverticulitis, distended sigmoid, pericolonic fat stranding, small amount of pelvic fluid. No definite perforation or obstruction; uterine mass     Patient is overall doing well this morning and is hemodynamically stable as well as afebrile.      Plan:   - Remain NPO, consider starting clear liquid diet today  - IV fluid hydration  - Continue IV Zosyn  - Monitor abdominal exam  - Monitor fever curve, WBC   - Remainder of care per primary   Controlled type 2 diabetes mellitus with complication, without long-term current use of insulin ([facility])  Lab Results   Component Value Date    HGBA1C 6.1 (H) 10/12/2024       Recent Labs     10/13/24  2047 10/14/24  0750 10/14/24  1051 10/14/24  1521   POCGLU 104 98 81 79       Blood Sugar Average: Last 72 hrs:  (P) 111.0384612833154879      Please contact the SecureChat role for the Colorectal service with any questions/concerns.    Subjective   No acute events overnight.  Her pain is well-controlled.  She denies any nausea, vomiting, fever, chills, shortness of breath, chest pain.  She is voiding appropriately.  She is having bowel movements and passing flatus.    Objective :  Temp:  [98.2 °F (36.8 °C)-100 °F (37.8 °C)] 99.7 °F (37.6 °C)  HR:  [] 100  BP: (126-148)/(70-82) 126/73  Resp:  [16-18] 17  SpO2:  [91 %-97 %] 95 %  O2 Device: None (Room air)    I/O         10/12 0701  10/13 0700 10/13 0701  10/14 0700 10/14 0701  10/15 0700    P.O. 810 500 0    I.V. (mL/kg) 1422.5 (20.7) 2152.5 (31.3) 953.3 (13.9)    IV Piggyback       Total Intake(mL/kg) 2232.5 (32.5)  2652.5 (38.6) 953.3 (13.9)    Net +2232.5 +2652.5 +953.3           Unmeasured Urine Occurrence 1 x 2 x     Unmeasured Stool Occurrence  1 x             Physical Exam  Constitutional:       Appearance: Normal appearance.   HENT:      Head: Normocephalic and atraumatic.      Right Ear: External ear normal.      Left Ear: External ear normal.      Nose: Nose normal.   Eyes:      Conjunctiva/sclera: Conjunctivae normal.   Cardiovascular:      Rate and Rhythm: Normal rate.      Comments: Appears well-perfused  Pulmonary:      Effort: Pulmonary effort is normal. No respiratory distress.   Abdominal:      General: Abdomen is flat. There is no distension.      Palpations: Abdomen is soft.      Tenderness: There is abdominal tenderness (Tenderness in bilateral lower quadrants).   Skin:     General: Skin is warm and dry.   Neurological:      General: No focal deficit present.      Mental Status: She is alert and oriented to person, place, and time.   Psychiatric:         Mood and Affect: Mood normal.         Behavior: Behavior normal.           Lab Results: I have reviewed the following results:  Recent Labs     10/12/24  2215 10/13/24  0459 10/14/24  0450   WBC  --    < > 14.94*   HGB  --    < > 11.1*   HCT  --    < > 34.3*   PLT  --    < > 209   SODIUM  --    < > 138   K  --    < > 3.5   CL  --    < > 106   CO2  --    < > 25   BUN  --    < > 9   CREATININE  --    < > 0.54*   GLUC  --    < > 103   MG  --   --  2.0   AST  --   --  12*   ALT  --   --  12   ALB  --   --  3.0*   TBILI  --   --  0.73   ALKPHOS  --   --  69   LACTICACID 1.7  --   --     < > = values in this interval not displayed.       Imaging Results Review: I reviewed radiology reports from this admission including: CT abdomen/pelvis.  Other Study Results Review: No additional pertinent studies reviewed.    VTE Pharmacologic Prophylaxis: Enoxaparin (Lovenox)  VTE Mechanical Prophylaxis: sequential compression device

## 2024-10-14 NOTE — ASSESSMENT & PLAN NOTE
Lab Results   Component Value Date    HGBA1C 6.1 (H) 10/12/2024       Recent Labs     10/13/24  1113 10/13/24  1628 10/13/24  2047 10/14/24  0750   POCGLU 123 128 104 98     Blood Sugar Average: Last 72 hrs:  (P) 121  Metformin use only  Continue Accu-Cheks.  No need for sliding scale

## 2024-10-14 NOTE — PLAN OF CARE
Problem: PAIN - ADULT  Goal: Verbalizes/displays adequate comfort level or baseline comfort level  Description: Interventions:  - Encourage patient to monitor pain and request assistance  - Assess pain using appropriate pain scale  - Administer analgesics based on type and severity of pain and evaluate response  - Implement non-pharmacological measures as appropriate and evaluate response  - Consider cultural and social influences on pain and pain management  - Notify physician/advanced practitioner if interventions unsuccessful or patient reports new pain  Outcome: Progressing     Problem: INFECTION - ADULT  Goal: Absence or prevention of progression during hospitalization  Description: INTERVENTIONS:  - Assess and monitor for signs and symptoms of infection  - Monitor lab/diagnostic results  - Monitor all insertion sites, i.e. indwelling lines, tubes, and drains  - Monitor endotracheal if appropriate and nasal secretions for changes in amount and color  - [place] appropriate cooling/warming therapies per order  - Administer medications as ordered  - Instruct and encourage patient and family to use good hand hygiene technique  - Identify and instruct in appropriate isolation precautions for identified infection/condition  Outcome: Progressing     Problem: SAFETY ADULT  Goal: Patient will remain free of falls  Description: INTERVENTIONS:  - Educate patient/family on patient safety including physical limitations  - Instruct patient to call for assistance with activity   - Consult OT/PT to assist with strengthening/mobility   - Keep Call bell within reach  - Keep bed low and locked with side rails adjusted as appropriate  - Keep care items and personal belongings within reach  - Initiate and maintain comfort rounds  - Make Fall Risk Sign visible to staff  - Offer Toileting every 2 Hours, in advance of need    Problem: DISCHARGE PLANNING  Goal: Discharge to home or other facility with appropriate  resources  Description: INTERVENTIONS:  - Identify barriers to discharge w/patient and caregiver  - Arrange for needed discharge resources and transportation as appropriate  - Identify discharge learning needs (meds, wound care, etc.)  - Arrange for interpretive services to assist at discharge as needed  - Refer to Case Management Department for coordinating discharge planning if the patient needs post-hospital services based on physician/advanced practitioner order or complex needs related to functional status, cognitive ability, or social support system  Outcome: Progressing   - Apply yellow socks and bracelet for high fall risk patients  - Consider moving patient to room near nurses station  Outcome: Progressing

## 2024-10-14 NOTE — ASSESSMENT & PLAN NOTE
"Patient presented with abdominal pain.  CT on admission \"There is new mild diverticulitis within the more distal sigmoid colon, along with underlying constipation\"   Repeat CT performed on October 13 in the context of worsening fever and leukocytosis showed \"There is progression of pericolonic fat stranding and development of a small amount of fluid in the pelvis since the prior study. The findings may represent progression of colitis or diverticulitis. Redemonstration of a stool distended sigmoid colon.\"   First episode of diverticulitis - seen in ED 6/2024, discharged home on augmentin. Second episode - seen in ED 9/22/24, discharged home off ABX.  Colonoscopy 8/2024: Extensive diverticulosis of severe severity in the transverse colon, descending colon and sigmoid colon   BM 10/12 (hard small amount). Prior to that was Wednesday.  Now overnight states she has been having better bowel movements.  Was withheld from antibiotics on admission secondary to only minimal elevation in white count and mild symptoms.  However since that time developed fever, significant elevated white count and continued pain and therefore IV Zosyn started 10/12.  Consulted colorectal surgery  Continue IV zosyn at this time. Follow temp/WBC  NPO except ice chips to allow bowel rest except sips with meds  Nutrition consult for dietary recommendations for ongoing education  Miralax, senna, colace  Continue IV fluids  "

## 2024-10-14 NOTE — PLAN OF CARE
Problem: PAIN - ADULT  Goal: Verbalizes/displays adequate comfort level or baseline comfort level  Description: Interventions:  - Encourage patient to monitor pain and request assistance  - Assess pain using appropriate pain scale  - Administer analgesics based on type and severity of pain and evaluate response  - Implement non-pharmacological measures as appropriate and evaluate response  - Consider cultural and social influences on pain and pain management  - Notify physician/advanced practitioner if interventions unsuccessful or patient reports new pain  Outcome: Progressing     Problem: INFECTION - ADULT  Goal: Absence or prevention of progression during hospitalization  Description: INTERVENTIONS:  - Assess and monitor for signs and symptoms of infection  - Monitor lab/diagnostic results  - Monitor all insertion sites, i.e. indwelling lines, tubes, and drains  - Monitor endotracheal if appropriate and nasal secretions for changes in amount and color  - [place] appropriate cooling/warming therapies per order  - Administer medications as ordered  - Instruct and encourage patient and family to use good hand hygiene technique  - Identify and instruct in appropriate isolation precautions for identified infection/condition  Outcome: Progressing  Goal: Absence of fever/infection during neutropenic period  Description: INTERVENTIONS:  - Monitor WBC    Outcome: Progressing     Problem: SAFETY ADULT  Goal: Patient will remain free of falls  Description: INTERVENTIONS:  - Educate patient/family on patient safety including physical limitations  - Instruct patient to call for assistance with activity   - Consult OT/PT to assist with strengthening/mobility   - Keep Call bell within reach  - Keep bed low and locked with side rails adjusted as appropriate  - Keep care items and personal belongings within reach  - Initiate and maintain comfort rounds  - Make Fall Risk Sign visible to staff  - Offer Toileting every  Hours,  in advance of need  - Initiate/Maintain alarm  - Obtain necessary fall risk management equipment:   - Apply yellow socks and bracelet for high fall risk patients  - Consider moving patient to room near nurses station  Outcome: Progressing  Goal: Maintain or return to baseline ADL function  Description: INTERVENTIONS:  -  Assess patient's ability to carry out ADLs; assess patient's baseline for ADL function and identify physical deficits which impact ability to perform ADLs (bathing, care of mouth/teeth, toileting, grooming, dressing, etc.)  - Assess/evaluate cause of self-care deficits   - Assess range of motion  - Assess patient's mobility; develop plan if impaired  - Assess patient's need for assistive devices and provide as appropriate  - Encourage maximum independence but intervene and supervise when necessary  - Involve family in performance of ADLs  - Assess for home care needs following discharge   - Consider OT consult to assist with ADL evaluation and planning for discharge  - Provide patient education as appropriate  Outcome: Progressing  Goal: Maintains/Returns to pre admission functional level  Description: INTERVENTIONS:  - Perform AM-PAC 6 Click Basic Mobility/ Daily Activity assessment daily.  - Set and communicate daily mobility goal to care team and patient/family/caregiver.   - Collaborate with rehabilitation services on mobility goals if consulted  - Perform Range of Motion 3 times a day.  - Reposition patient every 3 hours.  - Dangle patient 3 times a day  - Stand patient 3 times a day  - Ambulate patient 3 times a day  - Out of bed to chair 3 times a day   - Out of bed for meals 3 times a day  - Out of bed for toileting  - Record patient progress and toleration of activity level   Outcome: Progressing     Problem: DISCHARGE PLANNING  Goal: Discharge to home or other facility with appropriate resources  Description: INTERVENTIONS:  - Identify barriers to discharge w/patient and caregiver  -  Arrange for needed discharge resources and transportation as appropriate  - Identify discharge learning needs (meds, wound care, etc.)  - Arrange for interpretive services to assist at discharge as needed  - Refer to Case Management Department for coordinating discharge planning if the patient needs post-hospital services based on physician/advanced practitioner order or complex needs related to functional status, cognitive ability, or social support system  Outcome: Progressing

## 2024-10-14 NOTE — ASSESSMENT & PLAN NOTE
Lab Results   Component Value Date    HGBA1C 6.1 (H) 10/12/2024       Recent Labs     10/13/24  2047 10/14/24  0750 10/14/24  1051 10/14/24  1521   POCGLU 104 98 81 79       Blood Sugar Average: Last 72 hrs:  (P) 111.0124625767822758

## 2024-10-14 NOTE — PROGRESS NOTES
"Progress Note - Hospitalist   Name: Ama Arteaga 79 y.o. female I MRN: [id number]  Unit/Bed#: W -01 I Date of Admission: 10/11/2024   Date of Service: 10/14/2024 I Hospital Day: 2    Assessment & Plan  Diverticulitis  Patient presented with abdominal pain.  CT on admission \"There is new mild diverticulitis within the more distal sigmoid colon, along with underlying constipation\"   Repeat CT performed on October 13 in the context of worsening fever and leukocytosis showed \"There is progression of pericolonic fat stranding and development of a small amount of fluid in the pelvis since the prior study. The findings may represent progression of colitis or diverticulitis. Redemonstration of a stool distended sigmoid colon.\"   First episode of diverticulitis - seen in ED 6/2024, discharged home on augmentin. Second episode - seen in ED 9/22/24, discharged home off ABX.  Colonoscopy 8/2024: Extensive diverticulosis of severe severity in the transverse colon, descending colon and sigmoid colon   BM 10/12 (hard small amount). Prior to that was Wednesday.  Now overnight states she has been having better bowel movements.  Was withheld from antibiotics on admission secondary to only minimal elevation in white count and mild symptoms.  However since that time developed fever, significant elevated white count and continued pain and therefore IV Zosyn started 10/12.  Consulted colorectal surgery  Continue IV zosyn at this time. Follow temp/WBC  NPO except ice chips to allow bowel rest except sips with meds  Nutrition consult for dietary recommendations for ongoing education  Miralax, senna, colace  Continue IV fluids  Severe sepsis (HCC)  Patient met septic criteria given WBC 19, fever and tachycardia--now clinically improving  Lactic acid 2.4 - thus met severe sepsis on 10/12  Lactic acid cleared  No fluid boluses indicated but did receive maintenance fluids  Blood cultures obtained and thus far negative  LFTS WNL  BP " stable  Continue IV zosyn and monitor temp/WBC closely  Hyponatremia  Lab Results   Component Value Date    SODIUM 138 10/14/2024    SODIUM 133 (L) 10/13/2024    SODIUM 132 (L) 10/12/2024     S/P 1 L NSS in the ED on arrival and 1 dose of IV toradol  Minimal PO intake; encourage oral intake when appropriate  Normalized today.  Hypertension  Continue metoprolol  SBP stable   Controlled type 2 diabetes mellitus with complication, without long-term current use of insulin (HCC)  Lab Results   Component Value Date    HGBA1C 6.1 (H) 10/12/2024       Recent Labs     10/13/24  1113 10/13/24  1628 10/13/24  2047 10/14/24  0750   POCGLU 123 128 104 98     Blood Sugar Average: Last 72 hrs:  (P) 121  Metformin use only  Continue Accu-Cheks.  No need for sliding scale  Pelvic mass  CT: Incidental 2.9 cm mass in the region of the lower uterine segment.   Nonemergent pelvic ultrasound for better evaluation - fibroid vs malignancy  Not seeing GYN as outpt any longer given age  Patient/daughter made aware and will follow up as outpt for this.     VTE Pharmacologic Prophylaxis: VTE Score: 6     Mobility:   Basic Mobility Inpatient Raw Score: 23  JH-HLM Goal: 7: Walk 25 feet or more  JH-HLM Achieved: 6: Walk 10 steps or more  JH-HLM Goal NOT achieved. Continue with multidisciplinary rounding and encourage appropriate mobility to improve upon JH-HLM goals.    Patient Centered Rounds: I performed bedside rounds with nursing staff today.   Discussions with Specialists or Other Care Team Provider: surgery    Education and Discussions with Family / Patient: Updated  (daughter) via phone.    Current Length of Stay: 2 day(s)  Current Patient Status: Inpatient   Certification Statement: The patient will continue to require additional inpatient hospital stay due to IV antbx  Discharge Plan: Anticipate discharge in 48-72 hrs to home.    Code Status: Level 1 - Full Code    Subjective   Patient states she is finally having a lot of  bowel movements since yesterday and says she must be cleaned out by now.  Not reporting significant pain.  Requesting ice chips.  Feels comfortable getting out of bed to ambulate with only very slight momentary lightheadedness changing position.    Objective :  Temp:  [98 °F (36.7 °C)-102.6 °F (39.2 °C)] 98.2 °F (36.8 °C)  HR:  [] 103  BP: (131-148)/(65-82) 148/82  Resp:  [14-18] 18  SpO2:  [90 %-97 %] 97 %  O2 Device: None (Room air)    Body mass index is 27.69 kg/m².     Input and Output Summary (last 24 hours):     Intake/Output Summary (Last 24 hours) at 10/14/2024 0917  Last data filed at 10/14/2024 0600  Gross per 24 hour   Intake 2152.5 ml   Output --   Net 2152.5 ml       Physical Exam  Vitals reviewed.   Constitutional:       General: She is not in acute distress.     Appearance: Normal appearance. She is not ill-appearing, toxic-appearing or diaphoretic.      Comments: Well-appearing female, looks younger than stated age, able to get up and ambulate on supervision   HENT:      Nose: No congestion.   Eyes:      General: No scleral icterus.        Right eye: No discharge.         Left eye: No discharge.      Conjunctiva/sclera: Conjunctivae normal.   Cardiovascular:      Rate and Rhythm: Normal rate and regular rhythm.      Heart sounds: No murmur heard.  Pulmonary:      Effort: No respiratory distress.      Breath sounds: No stridor. No wheezing, rhonchi or rales.   Abdominal:      General: There is no distension.      Palpations: Abdomen is soft.      Tenderness: There is no abdominal tenderness. There is no guarding.   Musculoskeletal:      Right lower leg: No edema.      Left lower leg: No edema.   Skin:     General: Skin is warm and dry.      Coloration: Skin is not jaundiced or pale.      Findings: No bruising, erythema, lesion or rash.   Neurological:      General: No focal deficit present.      Mental Status: She is alert. Mental status is at baseline.      Comments: Awake alert interactive, no  confusion noted   Psychiatric:         Mood and Affect: Mood normal.         Thought Content: Thought content normal.           Lines/Drains:        Lab Results: I have reviewed the following results:   Results from last 7 days   Lab Units 10/14/24  0450   WBC Thousand/uL 14.94*   HEMOGLOBIN g/dL 11.1*   HEMATOCRIT % 34.3*   PLATELETS Thousands/uL 209   SEGS PCT % 85*   LYMPHO PCT % 6*   MONO PCT % 7   EOS PCT % 1     Results from last 7 days   Lab Units 10/14/24  0450   SODIUM mmol/L 138   POTASSIUM mmol/L 3.5   CHLORIDE mmol/L 106   CO2 mmol/L 25   BUN mg/dL 9   CREATININE mg/dL 0.54*   ANION GAP mmol/L 7   CALCIUM mg/dL 8.8   ALBUMIN g/dL 3.0*   TOTAL BILIRUBIN mg/dL 0.73   ALK PHOS U/L 69   ALT U/L 12   AST U/L 12*   GLUCOSE RANDOM mg/dL 103         Results from last 7 days   Lab Units 10/14/24  0750 10/13/24  2047 10/13/24  1628 10/13/24  1113 10/13/24  0724 10/12/24  2056 10/12/24  1601   POC GLUCOSE mg/dl 98 104 128 123 128 135 131     Results from last 7 days   Lab Units 10/12/24  0442   HEMOGLOBIN A1C % 6.1*     Results from last 7 days   Lab Units 10/13/24  0459 10/12/24  2215 10/12/24  1948 10/12/24  1728 10/12/24  1444   LACTIC ACID mmol/L  --  1.7 2.1* 2.1* 2.4*   PROCALCITONIN ng/ml 0.81*  --   --   --  0.43*       Recent Cultures (last 7 days):   Results from last 7 days   Lab Units 10/12/24  1443   BLOOD CULTURE  No Growth at 24 hrs.  No Growth at 24 hrs.       CT A/P      Last 24 Hours Medication List:     Current Facility-Administered Medications:     acetaminophen (TYLENOL) tablet 650 mg, Q6H PRN    ALPRAZolam (XANAX) tablet 0.25 mg, HS PRN    atorvastatin (LIPITOR) tablet 20 mg, HS    enoxaparin (LOVENOX) subcutaneous injection 40 mg, Daily    HYDROmorphone HCl (DILAUDID) injection 0.2 mg, Q3H PRN    levothyroxine tablet 25 mcg, Early Morning    metoprolol succinate (TOPROL-XL) 24 hr tablet 50 mg, HS    ondansetron (ZOFRAN) injection 4 mg, Once PRN    oxyCODONE (ROXICODONE) split tablet 2.5 mg,  Q4H PRN **OR** oxyCODONE (ROXICODONE) IR tablet 5 mg, Q4H PRN    [COMPLETED] piperacillin-tazobactam (ZOSYN) 4.5 g in sodium chloride 0.9 % 100 mL IV LOADING DOSE, Once, Last Rate: Stopped (10/12/24 2047) **FOLLOWED BY** piperacillin-tazobactam (ZOSYN) 4.5 g in sodium chloride 0.9 % 100 mL IVPB (EXTENDED INFUSION), Q8H, Last Rate: 4.5 g (10/14/24 0835)    polyethylene glycol (MIRALAX) packet 17 g, Daily    senna-docusate sodium (SENOKOT S) 8.6-50 mg per tablet 1 tablet, BID    sodium chloride 0.9 % infusion, Continuous, Last Rate: 100 mL/hr (10/14/24 0500)    Administrative Statements   Today, Patient Was Seen By: Lisandra Roberson PA-C      **Please Note: This note may have been constructed using a voice recognition system.**

## 2024-10-14 NOTE — ASSESSMENT & PLAN NOTE
Presenting 10/11 with abdominal pain, nausea  Found to have acute diverticulitis, two previous episodes this year  Admitted to medicine service, originally monitored off abx.   10/13 CTAP: diverticulitis, distended sigmoid, pericolonic fat stranding, small amount of pelvic fluid. No definite perforation or obstruction; uterine mass     Patient is overall doing well this morning and is hemodynamically stable as well as afebrile.      Plan:   - Remain NPO, consider starting clear liquid diet today  - IV fluid hydration  - Continue IV Zosyn  - Monitor abdominal exam  - Monitor fever curve, WBC   - Remainder of care per primary

## 2024-10-14 NOTE — ASSESSMENT & PLAN NOTE
Patient met septic criteria given WBC 19, fever and tachycardia--now clinically improving  Lactic acid 2.4 - thus met severe sepsis on 10/12  Lactic acid cleared  No fluid boluses indicated but did receive maintenance fluids  Blood cultures obtained and thus far negative  LFTS WNL  BP stable  Continue IV zosyn and monitor temp/WBC closely

## 2024-10-14 NOTE — ASSESSMENT & PLAN NOTE
Lab Results   Component Value Date    SODIUM 138 10/14/2024    SODIUM 133 (L) 10/13/2024    SODIUM 132 (L) 10/12/2024     S/P 1 L NSS in the ED on arrival and 1 dose of IV toradol  Minimal PO intake; encourage oral intake when appropriate  Normalized today.

## 2024-10-15 PROBLEM — Z86.39 HX OF HYPOKALEMIA: Status: ACTIVE | Noted: 2024-10-15

## 2024-10-15 LAB
ANION GAP SERPL CALCULATED.3IONS-SCNC: 8 MMOL/L (ref 4–13)
BASOPHILS # BLD AUTO: 0.05 THOUSANDS/ΜL (ref 0–0.1)
BASOPHILS NFR BLD AUTO: 1 % (ref 0–1)
BUN SERPL-MCNC: 10 MG/DL (ref 5–25)
CALCIUM SERPL-MCNC: 8.3 MG/DL (ref 8.4–10.2)
CHLORIDE SERPL-SCNC: 107 MMOL/L (ref 96–108)
CO2 SERPL-SCNC: 24 MMOL/L (ref 21–32)
CREAT SERPL-MCNC: 0.48 MG/DL (ref 0.6–1.3)
EOSINOPHIL # BLD AUTO: 0.39 THOUSAND/ΜL (ref 0–0.61)
EOSINOPHIL NFR BLD AUTO: 4 % (ref 0–6)
ERYTHROCYTE [DISTWIDTH] IN BLOOD BY AUTOMATED COUNT: 13.3 % (ref 11.6–15.1)
GFR SERPL CREATININE-BSD FRML MDRD: 92 ML/MIN/1.73SQ M
GLUCOSE SERPL-MCNC: 109 MG/DL (ref 65–140)
GLUCOSE SERPL-MCNC: 130 MG/DL (ref 65–140)
GLUCOSE SERPL-MCNC: 67 MG/DL (ref 65–140)
GLUCOSE SERPL-MCNC: 70 MG/DL (ref 65–140)
GLUCOSE SERPL-MCNC: 73 MG/DL (ref 65–140)
GLUCOSE SERPL-MCNC: 86 MG/DL (ref 65–140)
GLUCOSE SERPL-MCNC: 94 MG/DL (ref 65–140)
HCT VFR BLD AUTO: 33.8 % (ref 34.8–46.1)
HGB BLD-MCNC: 10.8 G/DL (ref 11.5–15.4)
IMM GRANULOCYTES # BLD AUTO: 0.07 THOUSAND/UL (ref 0–0.2)
IMM GRANULOCYTES NFR BLD AUTO: 1 % (ref 0–2)
LYMPHOCYTES # BLD AUTO: 1.51 THOUSANDS/ΜL (ref 0.6–4.47)
LYMPHOCYTES NFR BLD AUTO: 14 % (ref 14–44)
MAGNESIUM SERPL-MCNC: 2 MG/DL (ref 1.9–2.7)
MCH RBC QN AUTO: 27.8 PG (ref 26.8–34.3)
MCHC RBC AUTO-ENTMCNC: 32 G/DL (ref 31.4–37.4)
MCV RBC AUTO: 87 FL (ref 82–98)
MONOCYTES # BLD AUTO: 0.78 THOUSAND/ΜL (ref 0.17–1.22)
MONOCYTES NFR BLD AUTO: 7 % (ref 4–12)
NEUTROPHILS # BLD AUTO: 7.82 THOUSANDS/ΜL (ref 1.85–7.62)
NEUTS SEG NFR BLD AUTO: 73 % (ref 43–75)
NRBC BLD AUTO-RTO: 0 /100 WBCS
PLATELET # BLD AUTO: 240 THOUSANDS/UL (ref 149–390)
PMV BLD AUTO: 10.4 FL (ref 8.9–12.7)
POTASSIUM SERPL-SCNC: 2.9 MMOL/L (ref 3.5–5.3)
RBC # BLD AUTO: 3.88 MILLION/UL (ref 3.81–5.12)
SODIUM SERPL-SCNC: 139 MMOL/L (ref 135–147)
WBC # BLD AUTO: 10.62 THOUSAND/UL (ref 4.31–10.16)

## 2024-10-15 PROCEDURE — 82948 REAGENT STRIP/BLOOD GLUCOSE: CPT

## 2024-10-15 PROCEDURE — 85025 COMPLETE CBC W/AUTO DIFF WBC: CPT | Performed by: PHYSICIAN ASSISTANT

## 2024-10-15 PROCEDURE — 99232 SBSQ HOSP IP/OBS MODERATE 35: CPT | Performed by: COLON & RECTAL SURGERY

## 2024-10-15 PROCEDURE — 99232 SBSQ HOSP IP/OBS MODERATE 35: CPT | Performed by: PHYSICIAN ASSISTANT

## 2024-10-15 PROCEDURE — 80048 BASIC METABOLIC PNL TOTAL CA: CPT | Performed by: PHYSICIAN ASSISTANT

## 2024-10-15 PROCEDURE — 83735 ASSAY OF MAGNESIUM: CPT | Performed by: PHYSICIAN ASSISTANT

## 2024-10-15 RX ORDER — POTASSIUM CHLORIDE 14.9 MG/ML
20 INJECTION INTRAVENOUS
Status: COMPLETED | OUTPATIENT
Start: 2024-10-15 | End: 2024-10-15

## 2024-10-15 RX ORDER — POTASSIUM CHLORIDE 1500 MG/1
40 TABLET, EXTENDED RELEASE ORAL ONCE
Status: COMPLETED | OUTPATIENT
Start: 2024-10-15 | End: 2024-10-15

## 2024-10-15 RX ADMIN — PIPERACILLIN SODIUM AND TAZOBACTAM SODIUM 4.5 G: 36; 4.5 INJECTION, POWDER, LYOPHILIZED, FOR SOLUTION INTRAVENOUS at 17:10

## 2024-10-15 RX ADMIN — METOPROLOL SUCCINATE 50 MG: 50 TABLET, EXTENDED RELEASE ORAL at 22:14

## 2024-10-15 RX ADMIN — PIPERACILLIN SODIUM AND TAZOBACTAM SODIUM 4.5 G: 36; 4.5 INJECTION, POWDER, LYOPHILIZED, FOR SOLUTION INTRAVENOUS at 08:33

## 2024-10-15 RX ADMIN — ENOXAPARIN SODIUM 40 MG: 40 INJECTION SUBCUTANEOUS at 08:48

## 2024-10-15 RX ADMIN — PIPERACILLIN SODIUM AND TAZOBACTAM SODIUM 4.5 G: 36; 4.5 INJECTION, POWDER, LYOPHILIZED, FOR SOLUTION INTRAVENOUS at 23:41

## 2024-10-15 RX ADMIN — POTASSIUM CHLORIDE 20 MEQ: 14.9 INJECTION, SOLUTION INTRAVENOUS at 11:52

## 2024-10-15 RX ADMIN — SODIUM CHLORIDE 100 ML/HR: 0.9 INJECTION, SOLUTION INTRAVENOUS at 22:11

## 2024-10-15 RX ADMIN — POTASSIUM CHLORIDE 20 MEQ: 14.9 INJECTION, SOLUTION INTRAVENOUS at 13:52

## 2024-10-15 RX ADMIN — ALPRAZOLAM 0.25 MG: 0.25 TABLET ORAL at 23:41

## 2024-10-15 RX ADMIN — POTASSIUM CHLORIDE 20 MEQ: 14.9 INJECTION, SOLUTION INTRAVENOUS at 08:41

## 2024-10-15 RX ADMIN — POTASSIUM CHLORIDE 40 MEQ: 1500 TABLET, EXTENDED RELEASE ORAL at 08:45

## 2024-10-15 RX ADMIN — SENNOSIDES AND DOCUSATE SODIUM 1 TABLET: 8.6; 5 TABLET ORAL at 08:48

## 2024-10-15 RX ADMIN — LEVOTHYROXINE SODIUM 25 MCG: 25 TABLET ORAL at 05:22

## 2024-10-15 RX ADMIN — SODIUM CHLORIDE 100 ML/HR: 0.9 INJECTION, SOLUTION INTRAVENOUS at 02:54

## 2024-10-15 RX ADMIN — ATORVASTATIN CALCIUM 20 MG: 20 TABLET, FILM COATED ORAL at 22:14

## 2024-10-15 NOTE — ASSESSMENT & PLAN NOTE
"Patient presented with abdominal pain.    CT on admission \"There is new mild diverticulitis within the more distal sigmoid colon, along with underlying constipation\"   Repeat CT October 13 in the context of worsening fever and leukocytosis showed \"There is progression of pericolonic fat stranding and development of a small amount of fluid in the pelvis since the prior study. The findings may represent progression of colitis or diverticulitis. Redemonstration of a stool distended sigmoid colon.\"   Colonoscopy 8/2024: Extensive diverticulosis of severe severity in the transverse colon, descending colon and sigmoid colon   First episode of diverticulitis - seen in ED 6/2024, discharged home on augmentin.   Second episode - seen in ED 9/22/24, discharged home off ABX due to mild symptoms.  However since that time developed fever, significant elevated white count and continued pain and therefore IV Zosyn started 10/12.  Consulted colorectal surgery  Continue IV zosyn day #4  Recommended slow advancement in diet.  Patient to start clears today  Continue IV fluids  "

## 2024-10-15 NOTE — ASSESSMENT & PLAN NOTE
Lab Results   Component Value Date    SODIUM 139 10/15/2024    SODIUM 138 10/14/2024    SODIUM 133 (L) 10/13/2024     S/P 1 L NSS in the ED on arrival and 1 dose of IV toradol  Minimal PO intake; encourage oral intake when appropriate  Normalized

## 2024-10-15 NOTE — PROGRESS NOTES
"Progress Note - Hospitalist   Name: Ama Arteaga 79 y.o. female I MRN: [id number]  Unit/Bed#: W -01 I Date of Admission: 10/11/2024   Date of Service: 10/15/2024 I Hospital Day: 3    Assessment & Plan  Diverticulitis  Patient presented with abdominal pain.    CT on admission \"There is new mild diverticulitis within the more distal sigmoid colon, along with underlying constipation\"   Repeat CT October 13 in the context of worsening fever and leukocytosis showed \"There is progression of pericolonic fat stranding and development of a small amount of fluid in the pelvis since the prior study. The findings may represent progression of colitis or diverticulitis. Redemonstration of a stool distended sigmoid colon.\"   Colonoscopy 8/2024: Extensive diverticulosis of severe severity in the transverse colon, descending colon and sigmoid colon   First episode of diverticulitis - seen in ED 6/2024, discharged home on augmentin.   Second episode - seen in ED 9/22/24, discharged home off ABX due to mild symptoms.  However since that time developed fever, significant elevated white count and continued pain and therefore IV Zosyn started 10/12.  Consulted colorectal surgery  Continue IV zosyn day #4  Recommended slow advancement in diet.  Patient to start clears today  Continue IV fluids  Severe sepsis (HCC)  Patient met septic criteria given WBC 19, fever and tachycardia--now clinically improving  Lactic acid 2.4 - thus met severe sepsis on 10/12  Lactic acid cleared  No fluid boluses indicated but did receive maintenance fluids  Blood cultures obtained and thus far negative  LFTS WNL  BP stable  Continue IV zosyn and monitor temp/WBC closely  Hypokalemia  Potassium level 2.9 today.  Repletion IV and oral has been ordered.  Recheck in a.m.  Hyponatremia  Lab Results   Component Value Date    SODIUM 139 10/15/2024    SODIUM 138 10/14/2024    SODIUM 133 (L) 10/13/2024     S/P 1 L NSS in the ED on arrival and 1 dose of IV " toradol  Minimal PO intake; encourage oral intake when appropriate  Normalized   Hypertension  Continue metoprolol  SBP stable   Controlled type 2 diabetes mellitus with complication, without long-term current use of insulin (HCC)  Lab Results   Component Value Date    HGBA1C 6.1 (H) 10/12/2024     Recent Labs     10/14/24  2205 10/15/24  0001 10/15/24  0555 10/15/24  0731   POCGLU 120 86 73 70     Blood Sugar Average: Last 72 hrs:  (P) 101.8021137556710952  Metformin on hold  Avoid hypoglycemia  Continue Accu-Cheks.  No need for sliding scale  Pelvic mass  CT: Incidental 2.9 cm mass in the region of the lower uterine segment.   Nonemergent pelvic ultrasound for better evaluation - fibroid vs malignancy  Not seeing GYN as outpt any longer given age  Patient/daughter made aware and will follow up as outpt for this.     VTE Pharmacologic Prophylaxis: VTE Score: 6  lovenox    Mobility:   Basic Mobility Inpatient Raw Score: 23  JH-HLM Goal: 7: Walk 25 feet or more  JH-HLM Achieved: 6: Walk 10 steps or more  JH-HLM Goal NOT achieved. Continue with multidisciplinary rounding and encourage appropriate mobility to improve upon JH-HLM goals.  The patient has been ambulating    Patient Centered Rounds: I performed bedside rounds with nursing staff today.   Discussions with Specialists or Other Care Team Provider: Case management and surgery    Education and Discussions with Family / Patient: Updated  (daughter) at bedside.  Multiple questions and concerns addressed to the best of my ability.  Provided reassurance for patient as she seems to be very anxious about possibility for future recurrence    Current Length of Stay: 3 day(s)  Current Patient Status: Inpatient   Certification Statement: The patient will continue to require additional inpatient hospital stay due to IV antibiotics, advancing diet slowly  Discharge Plan: Anticipate discharge in 24-48 hrs to home.    Code Status: Level 1 - Full  Code    Subjective   Patient states she is very eager to have her diet advanced today.  She notes some abdominal bloating.  She reports no abdominal pain at rest but has severe pain when people press very hard to her lower abdomen.  Daughter at bedside expressed concern about patient's low blood sugar yesterday of 69 but patient was asymptomatic.  She is having bowel movements.  States that she flushed the toilet too quickly before she could tell if she had possibly any blood in her bowel movement    Objective :  Temp:  [98.8 °F (37.1 °C)-99.8 °F (37.7 °C)] 99.5 °F (37.5 °C)  HR:  [] 86  BP: (126-149)/(72-76) 149/76  Resp:  [17-18] 17  SpO2:  [92 %-96 %] 96 %    Body mass index is 27.69 kg/m².     Input and Output Summary (last 24 hours):     Intake/Output Summary (Last 24 hours) at 10/15/2024 0951  Last data filed at 10/15/2024 0523  Gross per 24 hour   Intake 3178.32 ml   Output --   Net 3178.32 ml       Physical Exam  Vitals reviewed.   Constitutional:       General: She is not in acute distress.     Appearance: Normal appearance. She is not ill-appearing, toxic-appearing or diaphoretic.      Comments: Quite well-appearing, looks much younger than stated age   HENT:      Mouth/Throat:      Mouth: Mucous membranes are dry.   Eyes:      General: No scleral icterus.        Right eye: No discharge.         Left eye: No discharge.      Conjunctiva/sclera: Conjunctivae normal.   Cardiovascular:      Rate and Rhythm: Normal rate and regular rhythm.      Heart sounds: No murmur heard.  Pulmonary:      Effort: No respiratory distress.      Breath sounds: Normal breath sounds. No stridor. No wheezing, rhonchi or rales.   Abdominal:      General: Bowel sounds are normal. There is no distension.      Palpations: Abdomen is soft.      Tenderness: There is no abdominal tenderness. There is no guarding.   Musculoskeletal:      Right lower leg: No edema.      Left lower leg: No edema.   Skin:     General: Skin is warm and  dry.      Coloration: Skin is not jaundiced or pale.      Findings: No bruising, erythema, lesion or rash.   Neurological:      General: No focal deficit present.      Mental Status: She is alert. Mental status is at baseline.      Comments: Awake alert interactive.  No confusion noted   Psychiatric:         Mood and Affect: Mood normal.         Lines/Drains:          Lab Results: I have reviewed the following results:   Results from last 7 days   Lab Units 10/15/24  0549   WBC Thousand/uL 10.62*   HEMOGLOBIN g/dL 10.8*   HEMATOCRIT % 33.8*   PLATELETS Thousands/uL 240   SEGS PCT % 73   LYMPHO PCT % 14   MONO PCT % 7   EOS PCT % 4     Results from last 7 days   Lab Units 10/15/24  0549 10/14/24  0450   SODIUM mmol/L 139 138   POTASSIUM mmol/L 2.9* 3.5   CHLORIDE mmol/L 107 106   CO2 mmol/L 24 25   BUN mg/dL 10 9   CREATININE mg/dL 0.48* 0.54*   ANION GAP mmol/L 8 7   CALCIUM mg/dL 8.3* 8.8   ALBUMIN g/dL  --  3.0*   TOTAL BILIRUBIN mg/dL  --  0.73   ALK PHOS U/L  --  69   ALT U/L  --  12   AST U/L  --  12*   GLUCOSE RANDOM mg/dL 67 103         Results from last 7 days   Lab Units 10/15/24  0731 10/15/24  0555 10/15/24  0001 10/14/24  2205 10/14/24  2049 10/14/24  1521 10/14/24  1051 10/14/24  0750 10/13/24  2047 10/13/24  1628 10/13/24  1113 10/13/24  0724   POC GLUCOSE mg/dl 70 73 86 120 69 79 81 98 104 128 123 128     Results from last 7 days   Lab Units 10/12/24  0442   HEMOGLOBIN A1C % 6.1*     Results from last 7 days   Lab Units 10/13/24  0459 10/12/24  2215 10/12/24  1948 10/12/24  1728 10/12/24  1444   LACTIC ACID mmol/L  --  1.7 2.1* 2.1* 2.4*   PROCALCITONIN ng/ml 0.81*  --   --   --  0.43*       Recent Cultures (last 7 days):   Results from last 7 days   Lab Units 10/12/24  1443   BLOOD CULTURE  No Growth at 48 hrs.  No Growth at 48 hrs.         Last 24 Hours Medication List:     Current Facility-Administered Medications:     acetaminophen (TYLENOL) tablet 650 mg, Q6H PRN    ALPRAZolam (XANAX) tablet  0.25 mg, HS PRN    atorvastatin (LIPITOR) tablet 20 mg, HS    enoxaparin (LOVENOX) subcutaneous injection 40 mg, Daily    HYDROmorphone HCl (DILAUDID) injection 0.2 mg, Q3H PRN    levothyroxine tablet 25 mcg, Early Morning    metoprolol succinate (TOPROL-XL) 24 hr tablet 50 mg, HS    ondansetron (ZOFRAN) injection 4 mg, Once PRN    oxyCODONE (ROXICODONE) split tablet 2.5 mg, Q4H PRN **OR** oxyCODONE (ROXICODONE) IR tablet 5 mg, Q4H PRN    [COMPLETED] piperacillin-tazobactam (ZOSYN) 4.5 g in sodium chloride 0.9 % 100 mL IV LOADING DOSE, Once, Last Rate: Stopped (10/12/24 2047) **FOLLOWED BY** piperacillin-tazobactam (ZOSYN) 4.5 g in sodium chloride 0.9 % 100 mL IVPB (EXTENDED INFUSION), Q8H, Last Rate: 4.5 g (10/15/24 0833)    polyethylene glycol (MIRALAX) packet 17 g, Daily    potassium chloride 20 mEq IVPB (premix), Q2H, Last Rate: 20 mEq (10/15/24 0841)    senna-docusate sodium (SENOKOT S) 8.6-50 mg per tablet 1 tablet, BID    sodium chloride 0.9 % infusion, Continuous, Last Rate: 100 mL/hr (10/15/24 0254)    Administrative Statements   Today, Patient Was Seen By: Lisandra Roberson PA-C      **Please Note: This note may have been constructed using a voice recognition system.**

## 2024-10-15 NOTE — ASSESSMENT & PLAN NOTE
Lab Results   Component Value Date    HGBA1C 6.1 (H) 10/12/2024     Recent Labs     10/14/24  2205 10/15/24  0001 10/15/24  0555 10/15/24  0731   POCGLU 120 86 73 70     Blood Sugar Average: Last 72 hrs:  (P) 101.8976673114552460  Metformin on hold  Avoid hypoglycemia  Continue Accu-Cheks.  No need for sliding scale

## 2024-10-15 NOTE — PLAN OF CARE
Problem: PAIN - ADULT  Goal: Verbalizes/displays adequate comfort level or baseline comfort level  Description: Interventions:  - Encourage patient to monitor pain and request assistance  - Assess pain using appropriate pain scale  - Administer analgesics based on type and severity of pain and evaluate response  - Implement non-pharmacological measures as appropriate and evaluate response  - Consider cultural and social influences on pain and pain management  - Notify physician/advanced practitioner if interventions unsuccessful or patient reports new pain  Outcome: Progressing     Problem: INFECTION - ADULT  Goal: Absence or prevention of progression during hospitalization  Description: INTERVENTIONS:  - Assess and monitor for signs and symptoms of infection  - Monitor lab/diagnostic results  - Monitor all insertion sites, i.e. indwelling lines, tubes, and drains  - Monitor endotracheal if appropriate and nasal secretions for changes in amount and color  - [place] appropriate cooling/warming therapies per order  - Administer medications as ordered  - Instruct and encourage patient and family to use good hand hygiene technique  - Identify and instruct in appropriate isolation precautions for identified infection/condition  Outcome: Progressing  Goal: Absence of fever/infection during neutropenic period  Description: INTERVENTIONS:  - Monitor WBC    Outcome: Progressing     Problem: SAFETY ADULT  Goal: Patient will remain free of falls  Description: INTERVENTIONS:  - Educate patient/family on patient safety including physical limitations  - Instruct patient to call for assistance with activity   - Consult OT/PT to assist with strengthening/mobility   - Keep Call bell within reach  - Keep bed low and locked with side rails adjusted as appropriate  - Keep care items and personal belongings within reach  - Initiate and maintain comfort rounds  - Make Fall Risk Sign visible to staff  - Apply yellow socks and bracelet  for high fall risk patients  - Consider moving patient to room near nurses station  Outcome: Progressing  Goal: Maintain or return to baseline ADL function  Description: INTERVENTIONS:  -  Assess patient's ability to carry out ADLs; assess patient's baseline for ADL function and identify physical deficits which impact ability to perform ADLs (bathing, care of mouth/teeth, toileting, grooming, dressing, etc.)  - Assess/evaluate cause of self-care deficits   - Assess range of motion  - Assess patient's mobility; develop plan if impaired  - Assess patient's need for assistive devices and provide as appropriate  - Encourage maximum independence but intervene and supervise when necessary  - Involve family in performance of ADLs  - Assess for home care needs following discharge   - Consider OT consult to assist with ADL evaluation and planning for discharge  - Provide patient education as appropriate  Outcome: Progressing  Goal: Maintains/Returns to pre admission functional level  Description: INTERVENTIONS:  - Perform AM-PAC 6 Click Basic Mobility/ Daily Activity assessment daily.  - Set and communicate daily mobility goal to care team and patient/family/caregiver.   - Out of bed for toileting  - Record patient progress and toleration of activity level   Outcome: Progressing     Problem: DISCHARGE PLANNING  Goal: Discharge to home or other facility with appropriate resources  Description: INTERVENTIONS:  - Identify barriers to discharge w/patient and caregiver  - Arrange for needed discharge resources and transportation as appropriate  - Identify discharge learning needs (meds, wound care, etc.)  - Arrange for interpretive services to assist at discharge as needed  - Refer to Case Management Department for coordinating discharge planning if the patient needs post-hospital services based on physician/advanced practitioner order or complex needs related to functional status, cognitive ability, or social support  system  Outcome: Progressing     Problem: Knowledge Deficit  Goal: Patient/family/caregiver demonstrates understanding of disease process, treatment plan, medications, and discharge instructions  Description: Complete learning assessment and assess knowledge base.  Interventions:  - Provide teaching at level of understanding  - Provide teaching via preferred learning methods  Outcome: Progressing     Problem: Nutrition/Hydration-ADULT  Goal: Nutrient/Hydration intake appropriate for improving, restoring or maintaining nutritional needs  Description: Monitor and assess patient's nutrition/hydration status for malnutrition. Collaborate with interdisciplinary team and initiate plan and interventions as ordered.  Monitor patient's weight and dietary intake as ordered or per policy. Utilize nutrition screening tool and intervene as necessary. Determine patient's food preferences and provide high-protein, high-caloric foods as appropriate.     INTERVENTIONS:  - Monitor oral intake, urinary output, labs, and treatment plans  - Assess nutrition and hydration status and recommend course of action  - Evaluate amount of meals eaten  - Assist patient with eating if necessary   - Allow adequate time for meals  - Recommend/ encourage appropriate diets, oral nutritional supplements, and vitamin/mineral supplements  - Order, calculate, and assess calorie counts as needed  - Recommend, monitor, and adjust tube feedings and TPN/PPN based on assessed needs  - Assess need for intravenous fluids  - Provide specific nutrition/hydration education as appropriate  - Include patient/family/caregiver in decisions related to nutrition  Outcome: Progressing

## 2024-10-16 LAB
ANION GAP SERPL CALCULATED.3IONS-SCNC: 7 MMOL/L (ref 4–13)
BASOPHILS # BLD AUTO: 0.05 THOUSANDS/ΜL (ref 0–0.1)
BASOPHILS NFR BLD AUTO: 1 % (ref 0–1)
BUN SERPL-MCNC: 5 MG/DL (ref 5–25)
CALCIUM SERPL-MCNC: 8.5 MG/DL (ref 8.4–10.2)
CHLORIDE SERPL-SCNC: 105 MMOL/L (ref 96–108)
CO2 SERPL-SCNC: 26 MMOL/L (ref 21–32)
CREAT SERPL-MCNC: 0.52 MG/DL (ref 0.6–1.3)
EOSINOPHIL # BLD AUTO: 0.33 THOUSAND/ΜL (ref 0–0.61)
EOSINOPHIL NFR BLD AUTO: 4 % (ref 0–6)
ERYTHROCYTE [DISTWIDTH] IN BLOOD BY AUTOMATED COUNT: 13.2 % (ref 11.6–15.1)
GFR SERPL CREATININE-BSD FRML MDRD: 89 ML/MIN/1.73SQ M
GLUCOSE SERPL-MCNC: 117 MG/DL (ref 65–140)
GLUCOSE SERPL-MCNC: 121 MG/DL (ref 65–140)
GLUCOSE SERPL-MCNC: 145 MG/DL (ref 65–140)
GLUCOSE SERPL-MCNC: 88 MG/DL (ref 65–140)
GLUCOSE SERPL-MCNC: 92 MG/DL (ref 65–140)
HCT VFR BLD AUTO: 35.4 % (ref 34.8–46.1)
HGB BLD-MCNC: 11.6 G/DL (ref 11.5–15.4)
IMM GRANULOCYTES # BLD AUTO: 0.12 THOUSAND/UL (ref 0–0.2)
IMM GRANULOCYTES NFR BLD AUTO: 1 % (ref 0–2)
LYMPHOCYTES # BLD AUTO: 1.71 THOUSANDS/ΜL (ref 0.6–4.47)
LYMPHOCYTES NFR BLD AUTO: 20 % (ref 14–44)
MAGNESIUM SERPL-MCNC: 2 MG/DL (ref 1.9–2.7)
MCH RBC QN AUTO: 28.1 PG (ref 26.8–34.3)
MCHC RBC AUTO-ENTMCNC: 32.8 G/DL (ref 31.4–37.4)
MCV RBC AUTO: 86 FL (ref 82–98)
MONOCYTES # BLD AUTO: 0.81 THOUSAND/ΜL (ref 0.17–1.22)
MONOCYTES NFR BLD AUTO: 10 % (ref 4–12)
NEUTROPHILS # BLD AUTO: 5.54 THOUSANDS/ΜL (ref 1.85–7.62)
NEUTS SEG NFR BLD AUTO: 64 % (ref 43–75)
NRBC BLD AUTO-RTO: 0 /100 WBCS
PLATELET # BLD AUTO: 270 THOUSANDS/UL (ref 149–390)
PMV BLD AUTO: 10.4 FL (ref 8.9–12.7)
POTASSIUM SERPL-SCNC: 3.4 MMOL/L (ref 3.5–5.3)
RBC # BLD AUTO: 4.13 MILLION/UL (ref 3.81–5.12)
SODIUM SERPL-SCNC: 138 MMOL/L (ref 135–147)
WBC # BLD AUTO: 8.56 THOUSAND/UL (ref 4.31–10.16)

## 2024-10-16 PROCEDURE — 82948 REAGENT STRIP/BLOOD GLUCOSE: CPT

## 2024-10-16 PROCEDURE — 80048 BASIC METABOLIC PNL TOTAL CA: CPT | Performed by: PHYSICIAN ASSISTANT

## 2024-10-16 PROCEDURE — 83735 ASSAY OF MAGNESIUM: CPT | Performed by: PHYSICIAN ASSISTANT

## 2024-10-16 PROCEDURE — 85025 COMPLETE CBC W/AUTO DIFF WBC: CPT | Performed by: PHYSICIAN ASSISTANT

## 2024-10-16 PROCEDURE — 99232 SBSQ HOSP IP/OBS MODERATE 35: CPT | Performed by: PHYSICIAN ASSISTANT

## 2024-10-16 PROCEDURE — 99232 SBSQ HOSP IP/OBS MODERATE 35: CPT | Performed by: COLON & RECTAL SURGERY

## 2024-10-16 RX ORDER — POLYETHYLENE GLYCOL 3350 17 G/17G
17 POWDER, FOR SOLUTION ORAL DAILY PRN
Status: DISCONTINUED | OUTPATIENT
Start: 2024-10-16 | End: 2024-10-17 | Stop reason: HOSPADM

## 2024-10-16 RX ORDER — SACCHAROMYCES BOULARDII 250 MG
250 CAPSULE ORAL 2 TIMES DAILY
Status: DISCONTINUED | OUTPATIENT
Start: 2024-10-16 | End: 2024-10-17 | Stop reason: HOSPADM

## 2024-10-16 RX ORDER — POTASSIUM CHLORIDE 1500 MG/1
40 TABLET, EXTENDED RELEASE ORAL ONCE
Status: COMPLETED | OUTPATIENT
Start: 2024-10-16 | End: 2024-10-16

## 2024-10-16 RX ADMIN — METOPROLOL SUCCINATE 50 MG: 50 TABLET, EXTENDED RELEASE ORAL at 22:19

## 2024-10-16 RX ADMIN — Medication 250 MG: at 08:17

## 2024-10-16 RX ADMIN — ENOXAPARIN SODIUM 40 MG: 40 INJECTION SUBCUTANEOUS at 08:17

## 2024-10-16 RX ADMIN — PIPERACILLIN SODIUM AND TAZOBACTAM SODIUM 4.5 G: 36; 4.5 INJECTION, POWDER, LYOPHILIZED, FOR SOLUTION INTRAVENOUS at 23:03

## 2024-10-16 RX ADMIN — ATORVASTATIN CALCIUM 20 MG: 20 TABLET, FILM COATED ORAL at 22:19

## 2024-10-16 RX ADMIN — LEVOTHYROXINE SODIUM 25 MCG: 25 TABLET ORAL at 05:00

## 2024-10-16 RX ADMIN — ALPRAZOLAM 0.25 MG: 0.25 TABLET ORAL at 23:03

## 2024-10-16 RX ADMIN — Medication 250 MG: at 17:20

## 2024-10-16 RX ADMIN — PIPERACILLIN SODIUM AND TAZOBACTAM SODIUM 4.5 G: 36; 4.5 INJECTION, POWDER, LYOPHILIZED, FOR SOLUTION INTRAVENOUS at 08:17

## 2024-10-16 RX ADMIN — POTASSIUM CHLORIDE 40 MEQ: 1500 TABLET, EXTENDED RELEASE ORAL at 08:17

## 2024-10-16 RX ADMIN — PIPERACILLIN SODIUM AND TAZOBACTAM SODIUM 4.5 G: 36; 4.5 INJECTION, POWDER, LYOPHILIZED, FOR SOLUTION INTRAVENOUS at 17:20

## 2024-10-16 NOTE — PLAN OF CARE
Problem: PAIN - ADULT  Goal: Verbalizes/displays adequate comfort level or baseline comfort level  Description: Interventions:  - Encourage patient to monitor pain and request assistance  - Assess pain using appropriate pain scale  - Administer analgesics based on type and severity of pain and evaluate response  - Implement non-pharmacological measures as appropriate and evaluate response  - Consider cultural and social influences on pain and pain management  - Notify physician/advanced practitioner if interventions unsuccessful or patient reports new pain  Outcome: Progressing     Problem: INFECTION - ADULT  Goal: Absence or prevention of progression during hospitalization  Description: INTERVENTIONS:  - Assess and monitor for signs and symptoms of infection  - Monitor lab/diagnostic results  - Monitor all insertion sites, i.e. indwelling lines, tubes, and drains  - Monitor endotracheal if appropriate and nasal secretions for changes in amount and color  - [place] appropriate cooling/warming therapies per order  - Administer medications as ordered  - Instruct and encourage patient and family to use good hand hygiene technique  - Identify and instruct in appropriate isolation precautions for identified infection/condition  Outcome: Progressing  Goal: Absence of fever/infection during neutropenic period  Description: INTERVENTIONS:  - Monitor WBC    Outcome: Progressing     Problem: SAFETY ADULT  Goal: Patient will remain free of falls  Description: INTERVENTIONS:  - Educate patient/family on patient safety including physical limitations  - Instruct patient to call for assistance with activity   - Consult OT/PT to assist with strengthening/mobility   - Keep Call bell within reach  - Keep bed low and locked with side rails adjusted as appropriate  - Keep care items and personal belongings within reach  - Initiate and maintain comfort rounds  - Apply yellow socks and bracelet for high fall risk patients  - Consider  moving patient to room near nurses station  Outcome: Progressing  Goal: Maintain or return to baseline ADL function  Description: INTERVENTIONS:  -  Assess patient's ability to carry out ADLs; assess patient's baseline for ADL function and identify physical deficits which impact ability to perform ADLs (bathing, care of mouth/teeth, toileting, grooming, dressing, etc.)  - Assess/evaluate cause of self-care deficits   - Assess range of motion  - Assess patient's mobility; develop plan if impaired  - Assess patient's need for assistive devices and provide as appropriate  - Encourage maximum independence but intervene and supervise when necessary  - Involve family in performance of ADLs  - Assess for home care needs following discharge   - Consider OT consult to assist with ADL evaluation and planning for discharge  - Provide patient education as appropriate  Outcome: Progressing  Goal: Maintains/Returns to pre admission functional level  Description: INTERVENTIONS:  - Perform AM-PAC 6 Click Basic Mobility/ Daily Activity assessment daily.  - Set and communicate daily mobility goal to care team and patient/family/caregiver.   - Collaborate with rehabilitation services on mobility goals if consulted  - Out of bed for toileting  - Record patient progress and toleration of activity level   Outcome: Progressing     Problem: DISCHARGE PLANNING  Goal: Discharge to home or other facility with appropriate resources  Description: INTERVENTIONS:  - Identify barriers to discharge w/patient and caregiver  - Arrange for needed discharge resources and transportation as appropriate  - Identify discharge learning needs (meds, wound care, etc.)  - Arrange for interpretive services to assist at discharge as needed  - Refer to Case Management Department for coordinating discharge planning if the patient needs post-hospital services based on physician/advanced practitioner order or complex needs related to functional status, cognitive  ability, or social support system  Outcome: Progressing     Problem: Knowledge Deficit  Goal: Patient/family/caregiver demonstrates understanding of disease process, treatment plan, medications, and discharge instructions  Description: Complete learning assessment and assess knowledge base.  Interventions:  - Provide teaching at level of understanding  - Provide teaching via preferred learning methods  Outcome: Progressing     Problem: Nutrition/Hydration-ADULT  Goal: Nutrient/Hydration intake appropriate for improving, restoring or maintaining nutritional needs  Description: Monitor and assess patient's nutrition/hydration status for malnutrition. Collaborate with interdisciplinary team and initiate plan and interventions as ordered.  Monitor patient's weight and dietary intake as ordered or per policy. Utilize nutrition screening tool and intervene as necessary. Determine patient's food preferences and provide high-protein, high-caloric foods as appropriate.     INTERVENTIONS:  - Monitor oral intake, urinary output, labs, and treatment plans  - Assess nutrition and hydration status and recommend course of action  - Evaluate amount of meals eaten  - Assist patient with eating if necessary   - Allow adequate time for meals  - Recommend/ encourage appropriate diets, oral nutritional supplements, and vitamin/mineral supplements  - Order, calculate, and assess calorie counts as needed  - Recommend, monitor, and adjust tube feedings and TPN/PPN based on assessed needs  - Assess need for intravenous fluids  - Provide specific nutrition/hydration education as appropriate  - Include patient/family/caregiver in decisions related to nutrition  Outcome: Progressing

## 2024-10-16 NOTE — ASSESSMENT & PLAN NOTE
Lab Results   Component Value Date    HGBA1C 6.1 (H) 10/12/2024       Recent Labs     10/15/24  1138 10/15/24  1615 10/15/24  2104 10/16/24  0718   POCGLU 130 94 109 92       Blood Sugar Average: Last 72 hrs:  (P) 99

## 2024-10-16 NOTE — ASSESSMENT & PLAN NOTE
Patient met septic criteria given WBC 19, fever and tachycardia--now clinically improving  Lactic acid 2.4 - thus met severe sepsis on 10/12  Lactic acid cleared  No fluid boluses indicated but did receive maintenance fluids  Blood cultures negative  LFTS WNL  BP stable  Continue IV zosyn

## 2024-10-16 NOTE — ASSESSMENT & PLAN NOTE
2 previous episodes this year.     Plan:   - advance diet to lo-res, d/c IVF  - continue zosyn, f/u bcx (NG@72h)  - DVT ppx  - remainder of care per primary team

## 2024-10-16 NOTE — PROGRESS NOTES
Progress Note - Colorectal   Name: Ama Arteaga 79 y.o. female I MRN: [id number]  Unit/Bed#: W -01 I Date of Admission: 10/11/2024   Date of Service: 10/16/2024 I Hospital Day: 4     Assessment & Plan  Diverticulitis  2 previous episodes this year.     Plan:   - advance diet to lo-res, d/c IVF  - continue zosyn, f/u bcx (NG@72h)  - DVT ppx  - remainder of care per primary team  Controlled type 2 diabetes mellitus with complication, without long-term current use of insulin (HCC)  Lab Results   Component Value Date    HGBA1C 6.1 (H) 10/12/2024       Recent Labs     10/15/24  1138 10/15/24  1615 10/15/24  2104 10/16/24  0718   POCGLU 130 94 109 92       Blood Sugar Average: Last 72 hrs:  (P) 99    Hypertension    Severe sepsis (HCC)    Pelvic mass      Subjective/Objective   NAOE. Pain controlled. Tolerating CLD, no n/v. Having BM, flatus. Voiding. Walking.    Physical Exam:  General: NAD  CV: nl rate  Lungs: nl wob. No resp distress.  ABD: Soft, ND, mild RLQ tenderness  Extrem: No CCE  UOP x8  Stool x1    Patient Vitals for the past 24 hrs:   BP Temp Pulse Resp SpO2   10/16/24 0735 142/81 98.1 °F (36.7 °C) 87 18 94 %   10/15/24 2259 141/86 -- 84 18 97 %   10/15/24 2211 143/80 -- 92 -- --   10/15/24 1618 160/83 -- 87 -- 96 %   10/15/24 1516 159/84 97.7 °F (36.5 °C) 91 18 97 %       I/O         10/14 0701  10/15 0700 10/15 0701  10/16 0700    P.O. 240 240    I.V. (mL/kg) 2338.3 (34) 375 (5.5)    IV Piggyback 600 100    Total Intake(mL/kg) 3178.3 (46.3) 715 (10.4)    Net +3178.3 +715          Unmeasured Urine Occurrence 4 x 6 x    Unmeasured Stool Occurrence 1 x 1 x            Recent Labs     10/14/24  0450 10/15/24  0549 10/16/24  0451   WBC 14.94* 10.62* 8.56   HGB 11.1* 10.8* 11.6    240 270   SODIUM 138 139 138   K 3.5 2.9* 3.4*    107 105   CO2 25 24 26   BUN 9 10 5   CREATININE 0.54* 0.48* 0.52*   GLUC 103 67 88   CALCIUM 8.8 8.3* 8.5   MG 2.0 2.0 2.0   AST 12*  --   --    ALT 12  --   --     ALKPHOS 69  --   --    TBILI 0.73  --   --    EGFR 88 92 89     Lab Results   Component Value Date    BLOODCX No Growth at 72 hrs. 10/12/2024    BLOODCX No Growth at 72 hrs. 10/12/2024    URINECX (A) 08/27/2024     70,000-79,000 cfu/ml Staphylococcus coagulase negative    URINECX 70,000-79,000 cfu/ml 08/27/2024    LEUKOCYTESUR Large (A) 10/11/2024

## 2024-10-16 NOTE — ASSESSMENT & PLAN NOTE
Potassium level 2.9 on 10/15--improved to 3.4 following repletion provided yesterday.  Will give 1 more dose of p.o. now

## 2024-10-16 NOTE — ASSESSMENT & PLAN NOTE
Lab Results   Component Value Date    SODIUM 138 10/16/2024    SODIUM 139 10/15/2024    SODIUM 138 10/14/2024     S/P 1 L NSS in the ED on arrival and 1 dose of IV toradol  Minimal PO intake; encourage oral intake when appropriate  Normalized

## 2024-10-16 NOTE — ASSESSMENT & PLAN NOTE
"Patient presented with abdominal pain.    CT on admission \"There is new mild diverticulitis within the more distal sigmoid colon, along with underlying constipation\"   Repeat CT October 13 in the context of worsening fever and leukocytosis showed \"There is progression of pericolonic fat stranding and development of a small amount of fluid in the pelvis since the prior study. The findings may represent progression of colitis or diverticulitis. Redemonstration of a stool distended sigmoid colon.\"   Colonoscopy 8/2024: Extensive diverticulosis of severe severity in the transverse colon, descending colon and sigmoid colon   First episode of diverticulitis - seen in ED 6/2024, discharged home on augmentin.   Second episode - seen in ED 9/22/24, discharged home off ABX due to mild symptoms.  However since that time developed fever, significant elevated white count and continued pain and therefore IV Zosyn started 10/12.  Consulted colorectal surgery  Continue IV zosyn day #5, can change to p.o. Augmentin at discharge  Recommending slow advancement in diet--now ordered for full liquids, possibly due to low residue for dinner tonight and discharge tomorrow?  IV fluids were stopped  "

## 2024-10-16 NOTE — ASSESSMENT & PLAN NOTE
Lab Results   Component Value Date    HGBA1C 6.1 (H) 10/12/2024     Recent Labs     10/15/24  1138 10/15/24  1615 10/15/24  2104 10/16/24  0718   POCGLU 130 94 109 92     Blood Sugar Average: Last 72 hrs:  (P) 99  Metformin on hold--can resume at discharge  Avoid hypoglycemia  Continue Accu-Cheks.  No need for sliding scale

## 2024-10-17 ENCOUNTER — TELEPHONE (OUTPATIENT)
Age: 79
End: 2024-10-17

## 2024-10-17 ENCOUNTER — NURSE TRIAGE (OUTPATIENT)
Age: 79
End: 2024-10-17

## 2024-10-17 VITALS
BODY MASS INDEX: 27.86 KG/M2 | SYSTOLIC BLOOD PRESSURE: 136 MMHG | RESPIRATION RATE: 18 BRPM | DIASTOLIC BLOOD PRESSURE: 80 MMHG | OXYGEN SATURATION: 93 % | TEMPERATURE: 98.4 F | HEIGHT: 62 IN | WEIGHT: 151.4 LBS | HEART RATE: 83 BPM

## 2024-10-17 LAB
ANION GAP SERPL CALCULATED.3IONS-SCNC: 6 MMOL/L (ref 4–13)
BUN SERPL-MCNC: 5 MG/DL (ref 5–25)
CALCIUM SERPL-MCNC: 8.7 MG/DL (ref 8.4–10.2)
CHLORIDE SERPL-SCNC: 105 MMOL/L (ref 96–108)
CO2 SERPL-SCNC: 28 MMOL/L (ref 21–32)
CREAT SERPL-MCNC: 0.67 MG/DL (ref 0.6–1.3)
GFR SERPL CREATININE-BSD FRML MDRD: 82 ML/MIN/1.73SQ M
GLUCOSE SERPL-MCNC: 109 MG/DL (ref 65–140)
GLUCOSE SERPL-MCNC: 122 MG/DL (ref 65–140)
GLUCOSE SERPL-MCNC: 152 MG/DL (ref 65–140)
POTASSIUM SERPL-SCNC: 3.7 MMOL/L (ref 3.5–5.3)
SODIUM SERPL-SCNC: 139 MMOL/L (ref 135–147)

## 2024-10-17 PROCEDURE — 99239 HOSP IP/OBS DSCHRG MGMT >30: CPT | Performed by: PHYSICIAN ASSISTANT

## 2024-10-17 PROCEDURE — 80048 BASIC METABOLIC PNL TOTAL CA: CPT | Performed by: PHYSICIAN ASSISTANT

## 2024-10-17 PROCEDURE — 82948 REAGENT STRIP/BLOOD GLUCOSE: CPT

## 2024-10-17 PROCEDURE — 99232 SBSQ HOSP IP/OBS MODERATE 35: CPT | Performed by: COLON & RECTAL SURGERY

## 2024-10-17 RX ORDER — POLYETHYLENE GLYCOL 3350 17 G/17G
17 POWDER, FOR SOLUTION ORAL DAILY PRN
Start: 2024-10-17

## 2024-10-17 RX ORDER — ACETAMINOPHEN 325 MG/1
650 TABLET ORAL EVERY 6 HOURS PRN
Start: 2024-10-17

## 2024-10-17 RX ADMIN — Medication 250 MG: at 08:02

## 2024-10-17 RX ADMIN — ENOXAPARIN SODIUM 40 MG: 40 INJECTION SUBCUTANEOUS at 08:02

## 2024-10-17 RX ADMIN — PIPERACILLIN SODIUM AND TAZOBACTAM SODIUM 4.5 G: 36; 4.5 INJECTION, POWDER, LYOPHILIZED, FOR SOLUTION INTRAVENOUS at 08:01

## 2024-10-17 RX ADMIN — LEVOTHYROXINE SODIUM 25 MCG: 25 TABLET ORAL at 05:02

## 2024-10-17 NOTE — ASSESSMENT & PLAN NOTE
Presenting 10/11 with abdominal pain, nausea  Found to have acute diverticulitis, two previous episodes this year  Admitted to medicine service, originally monitored off abx  10/13 CTAP: diverticulitis, distended sigmoid, pericolonic fat stranding, small amount of pelvic fluid. No definite perforation or obstruction; uterine mass     Patient is overall doing well this morning is hemodynamically stable.    Plan:   - Continue low rest diet  - IV fluid hydration  - Monitor abdominal exam  - Monitor fever curve, WBC   - Remainder of care per primary

## 2024-10-17 NOTE — DISCHARGE INSTR - AVS FIRST PAGE
Dear Ama Arteaga,     It was our pleasure to care for you here at [facility].  It is our hope that we were always able to exceed the expected standards for your care during your stay.  You were hospitalized due to diverticulitis.  You were cared for on the west fourth floor by Lisandra Roberson PA-C under the service of Bess Solomon MD with the [facility] who covers for your primary care physician (PCP), Bret Fan MD, while you were hospitalized.  If you have any questions or concerns related to this hospitalization, you may contact us at .  For follow up as well as any medication refills, we recommend that you follow up with your primary care physician.  A registered nurse will reach out to you by phone within a few days after your discharge to answer any additional questions that you may have after going home.  However, at this time we provide for you here, the most important instructions / recommendations at discharge:     Notable Medication Adjustments -   Starting today take 5 more days of Augmentin  Maintain a good bowel regimen and avoid constipation.  We suggest MiraLAX  Testing Required after Discharge -   Pelvic ultrasound  ** Please contact your PCP to request testing orders for any of the testing recommended here **  Important follow up information -   Colorectal as already planned  Family doctor  Other Instructions -   Low fiber low residue diet for now  Please review this entire after visit summary as additional general instructions including medication list, appointments, activity, diet, any pertinent wound care, and other additional recommendations from your care team that may be provided for you.      Sincerely,     Lisandra Roberson PA-C

## 2024-10-17 NOTE — ASSESSMENT & PLAN NOTE
Lab Results   Component Value Date    SODIUM 139 10/17/2024    SODIUM 138 10/16/2024    SODIUM 139 10/15/2024     S/P 1 L NSS in the ED on arrival and 1 dose of IV toradol  Minimal PO intake; encourage oral intake when appropriate  Normalized

## 2024-10-17 NOTE — PLAN OF CARE
Problem: PAIN - ADULT  Goal: Verbalizes/displays adequate comfort level or baseline comfort level  Description: Interventions:  - Encourage patient to monitor pain and request assistance  - Assess pain using appropriate pain scale  - Administer analgesics based on type and severity of pain and evaluate response  - Implement non-pharmacological measures as appropriate and evaluate response  - Consider cultural and social influences on pain and pain management  - Notify physician/advanced practitioner if interventions unsuccessful or patient reports new pain  Outcome: Progressing     Problem: INFECTION - ADULT  Goal: Absence or prevention of progression during hospitalization  Description: INTERVENTIONS:  - Assess and monitor for signs and symptoms of infection  - Monitor lab/diagnostic results  - Monitor all insertion sites, i.e. indwelling lines, tubes, and drains  - Monitor endotracheal if appropriate and nasal secretions for changes in amount and color  - [place] appropriate cooling/warming therapies per order  - Administer medications as ordered  - Instruct and encourage patient and family to use good hand hygiene technique  - Identify and instruct in appropriate isolation precautions for identified infection/condition  Outcome: Progressing  Goal: Absence of fever/infection during neutropenic period  Description: INTERVENTIONS:  - Monitor WBC    Outcome: Progressing     Problem: SAFETY ADULT  Goal: Patient will remain free of falls  Description: INTERVENTIONS:  - Educate patient/family on patient safety including physical limitations  - Instruct patient to call for assistance with activity   - Consult OT/PT to assist with strengthening/mobility   - Keep Call bell within reach  - Keep bed low and locked with side rails adjusted as appropriate  - Keep care items and personal belongings within reach  - Initiate and maintain comfort rounds  - Make Fall Risk Sign visible to staff  - Offer Toileting every  Hours,  in advance of need  - Initiate/Maintain alarm  - Obtain necessary fall risk management equipment:   - Apply yellow socks and bracelet for high fall risk patients  - Consider moving patient to room near nurses station  Outcome: Progressing  Goal: Maintain or return to baseline ADL function  Description: INTERVENTIONS:  -  Assess patient's ability to carry out ADLs; assess patient's baseline for ADL function and identify physical deficits which impact ability to perform ADLs (bathing, care of mouth/teeth, toileting, grooming, dressing, etc.)  - Assess/evaluate cause of self-care deficits   - Assess range of motion  - Assess patient's mobility; develop plan if impaired  - Assess patient's need for assistive devices and provide as appropriate  - Encourage maximum independence but intervene and supervise when necessary  - Involve family in performance of ADLs  - Assess for home care needs following discharge   - Consider OT consult to assist with ADL evaluation and planning for discharge  - Provide patient education as appropriate  Outcome: Progressing  Goal: Maintains/Returns to pre admission functional level  Description: INTERVENTIONS:  - Perform AM-PAC 6 Click Basic Mobility/ Daily Activity assessment daily.  - Set and communicate daily mobility goal to care team and patient/family/caregiver.   - Collaborate with rehabilitation services on mobility goals if consulted  - Perform Range of Motion  times a day.  - Reposition patient every  hours.  - Dangle patient  times a day  - Stand patient  times a day  - Ambulate patient  times a day  - Out of bed to chair  times a day   - Out of bed for meals times a day  - Out of bed for toileting  - Record patient progress and toleration of activity level   Outcome: Progressing     Problem: DISCHARGE PLANNING  Goal: Discharge to home or other facility with appropriate resources  Description: INTERVENTIONS:  - Identify barriers to discharge w/patient and caregiver  - Arrange for  needed discharge resources and transportation as appropriate  - Identify discharge learning needs (meds, wound care, etc.)  - Arrange for interpretive services to assist at discharge as needed  - Refer to Case Management Department for coordinating discharge planning if the patient needs post-hospital services based on physician/advanced practitioner order or complex needs related to functional status, cognitive ability, or social support system  Outcome: Progressing     Problem: Knowledge Deficit  Goal: Patient/family/caregiver demonstrates understanding of disease process, treatment plan, medications, and discharge instructions  Description: Complete learning assessment and assess knowledge base.  Interventions:  - Provide teaching at level of understanding  - Provide teaching via preferred learning methods  Outcome: Progressing     Problem: Nutrition/Hydration-ADULT  Goal: Nutrient/Hydration intake appropriate for improving, restoring or maintaining nutritional needs  Description: Monitor and assess patient's nutrition/hydration status for malnutrition. Collaborate with interdisciplinary team and initiate plan and interventions as ordered.  Monitor patient's weight and dietary intake as ordered or per policy. Utilize nutrition screening tool and intervene as necessary. Determine patient's food preferences and provide high-protein, high-caloric foods as appropriate.     INTERVENTIONS:  - Monitor oral intake, urinary output, labs, and treatment plans  - Assess nutrition and hydration status and recommend course of action  - Evaluate amount of meals eaten  - Assist patient with eating if necessary   - Allow adequate time for meals  - Recommend/ encourage appropriate diets, oral nutritional supplements, and vitamin/mineral supplements  - Order, calculate, and assess calorie counts as needed  - Recommend, monitor, and adjust tube feedings and TPN/PPN based on assessed needs  - Assess need for intravenous fluids  -  Provide specific nutrition/hydration education as appropriate  - Include patient/family/caregiver in decisions related to nutrition  Outcome: Progressing

## 2024-10-17 NOTE — INCIDENTAL FINDINGS
The following findings require follow up:  Radiographic finding   Finding: pelvic mass   Follow up required: pelvic ultrasound   Follow up should be done within 4 weeks     Please notify the following clinician to assist with the follow up:   gyn    Incidental finding results were discussed with the Patient and daughter by Lisandra Roberson PA-C on 10/17/24.   They expressed understanding and all questions answered.

## 2024-10-17 NOTE — PROGRESS NOTES
Progress Note -  Colorectal Surgery  Name: Ama Arteaga 79 y.o. female I MRN: [id number]  Unit/Bed#: W -01 I Date of Admission: 10/11/2024   Date of Service: 10/17/2024 I Hospital Day: 5    Assessment & Plan  Diverticulitis  Presenting 10/11 with abdominal pain, nausea  Found to have acute diverticulitis, two previous episodes this year  Admitted to medicine service, originally monitored off abx  10/13 CTAP: diverticulitis, distended sigmoid, pericolonic fat stranding, small amount of pelvic fluid. No definite perforation or obstruction; uterine mass     Patient is overall doing well this morning is hemodynamically stable.    Plan:   - Continue low rest diet  - IV fluid hydration  - Monitor abdominal exam  - Monitor fever curve, WBC   - Remainder of care per primary     Please contact the SecureChat role for the Surgery-General service with any questions/concerns.    Subjective   No acute events overnight.  Patient reports that she is feeling well this morning.  She is not having any pain and is having bowel movements and is passing flatus.  She is tolerating a diet.  She denies any nausea, vomiting, fever, chills, chest pain, shortness of breath.  She is voiding.    Objective :  Temp:  [98.1 °F (36.7 °C)-98.5 °F (36.9 °C)] 98.5 °F (36.9 °C)  HR:  [83-87] 85  BP: (126-142)/(75-81) 126/75  Resp:  [18] 18  SpO2:  [94 %-95 %] 94 %  O2 Device: None (Room air)    I/O         10/15 0701  10/16 0700 10/16 0701  10/17 0700    P.O. 720 480    I.V. (mL/kg) 1056.7 (15.4)     IV Piggyback 400     Total Intake(mL/kg) 2176.7 (31.7) 480 (7)    Urine (mL/kg/hr) 0 (0) 0 (0)    Stool 0 1    Total Output 0 1    Net +2176.7 +479          Unmeasured Urine Occurrence 8 x 2 x    Unmeasured Stool Occurrence 1 x             Physical Exam  Constitutional:       Appearance: Normal appearance.   HENT:      Head: Normocephalic and atraumatic.      Right Ear: External ear normal.      Left Ear: External ear normal.      Nose: Nose normal.    Eyes:      Conjunctiva/sclera: Conjunctivae normal.   Cardiovascular:      Rate and Rhythm: Normal rate.      Comments: Appears well-perfused  Pulmonary:      Effort: Pulmonary effort is normal. No respiratory distress.   Abdominal:      General: Abdomen is flat. There is no distension.      Palpations: Abdomen is soft.      Tenderness: There is no abdominal tenderness.   Skin:     General: Skin is warm and dry.   Neurological:      General: No focal deficit present.      Mental Status: She is alert and oriented to person, place, and time.   Psychiatric:         Mood and Affect: Mood normal.         Behavior: Behavior normal.           Lab Results: I have reviewed the following results:  Recent Labs     10/16/24  0451   WBC 8.56   HGB 11.6   HCT 35.4      SODIUM 138   K 3.4*      CO2 26   BUN 5   CREATININE 0.52*   GLUC 88   MG 2.0       Imaging Results Review: No pertinent imaging studies reviewed.  Other Study Results Review: No additional pertinent studies reviewed.    VTE Pharmacologic Prophylaxis: Enoxaparin (Lovenox)  VTE Mechanical Prophylaxis: sequential compression device

## 2024-10-17 NOTE — ASSESSMENT & PLAN NOTE
"Patient presented with abdominal pain.    CT on admission \"There is new mild diverticulitis within the more distal sigmoid colon, along with underlying constipation\"   Repeat CT October 13 in the context of worsening fever and leukocytosis showed \"There is progression of pericolonic fat stranding and development of a small amount of fluid in the pelvis since the prior study. The findings may represent progression of colitis or diverticulitis. Redemonstration of a stool distended sigmoid colon.\"   Colonoscopy 8/2024: Extensive diverticulosis of severe severity in the transverse colon, descending colon and sigmoid colon   First episode of diverticulitis - seen in ED 6/2024, discharged home on augmentin.   Second episode - seen in ED 9/22/24, discharged home off ABX due to mild symptoms.  However since that time developed fever, significant elevated white count and continued pain and therefore IV Zosyn started 10/12.  Consulted colorectal surgery  After receiving 5 full days of IV zosyn, can now change to p.o. Augmentin at discharge to complete a 10-day course  Tolerated advancement in diet  IV fluids were stopped  "

## 2024-10-17 NOTE — TELEPHONE ENCOUNTER
Pt's daughter Danay called to schedule a hospital follow up with CRS. Call was transferred to the nurse to assist.

## 2024-10-17 NOTE — ASSESSMENT & PLAN NOTE
Patient met septic criteria given WBC 19, fever and tachycardia--now clinically improving  Lactic acid 2.4 - thus met severe sepsis on 10/12  Lactic acid cleared  No fluid boluses indicated but did receive maintenance fluids  Blood cultures negative  LFTS WNL  BP stable  Convert IV Zosyn to Augmentin

## 2024-10-17 NOTE — TELEPHONE ENCOUNTER
"Pt's daughter, Danay Cummings calling and transferred to nurse line for sooner appt w/ CRS.  Pt was in the ED 3 times in less than one month and admitted the 3rd time for diverticulitis.  Daughter reports pt was experiencing abd cramping, constipation, diaphoresis, \"heavy breathing,\" pallor and fatigue. She saw Dr. Zarate in the hospital and was advised to schedule follow up within 3 weeks.    Appt scheduled on 11/7/24.  Offered a sooner appt but unable to make day/time.  Pt's daughter comfortable with waiting for 11/7/24.      "

## 2024-10-17 NOTE — DISCHARGE SUMMARY
"Discharge Summary - Hospitalist   Name: Ama Arteaga 79 y.o. female I MRN: [id number]  Unit/Bed#: W -01 I Date of Admission: 10/11/2024   Date of Service: 10/17/2024 I Hospital Day: 5     Assessment & Plan  Diverticulitis  Patient presented with abdominal pain.    CT on admission \"There is new mild diverticulitis within the more distal sigmoid colon, along with underlying constipation\"   Repeat CT October 13 in the context of worsening fever and leukocytosis showed \"There is progression of pericolonic fat stranding and development of a small amount of fluid in the pelvis since the prior study. The findings may represent progression of colitis or diverticulitis. Redemonstration of a stool distended sigmoid colon.\"   Colonoscopy 8/2024: Extensive diverticulosis of severe severity in the transverse colon, descending colon and sigmoid colon   First episode of diverticulitis - seen in ED 6/2024, discharged home on augmentin.   Second episode - seen in ED 9/22/24, discharged home off ABX due to mild symptoms.  However since that time developed fever, significant elevated white count and continued pain and therefore IV Zosyn started 10/12.  Consulted colorectal surgery  After receiving 5 full days of IV zosyn, can now change to p.o. Augmentin at discharge to complete a 10-day course  Tolerated advancement in diet  IV fluids were stopped  Severe sepsis (HCC)  Patient met septic criteria given WBC 19, fever and tachycardia--now clinically improving  Lactic acid 2.4 - thus met severe sepsis on 10/12  Lactic acid cleared  No fluid boluses indicated but did receive maintenance fluids  Blood cultures negative  LFTS WNL  BP stable  Convert IV Zosyn to Augmentin  Hypokalemia  Potassium level 2.9 on 10/15--now repleted successfully  Hyponatremia  Lab Results   Component Value Date    SODIUM 139 10/17/2024    SODIUM 138 10/16/2024    SODIUM 139 10/15/2024     S/P 1 L NSS in the ED on arrival and 1 dose of IV toradol  Minimal " PO intake; encourage oral intake when appropriate  Normalized   Hypertension  Continue metoprolol  SBP stable   Controlled type 2 diabetes mellitus with complication, without long-term current use of insulin (HCC)  Lab Results   Component Value Date    HGBA1C 6.1 (H) 10/12/2024     Recent Labs     10/16/24  1111 10/16/24  1631 10/16/24  2151 10/17/24  0741   POCGLU 121 117 145* 122     Blood Sugar Average: Last 72 hrs:  (P) 100.375  Metformin on hold--can resume at discharge  Avoid hypoglycemia  Continue Accu-Cheks.  No need for sliding scale  Pelvic mass  CT: Incidental 2.9 cm mass in the region of the lower uterine segment.   Nonemergent pelvic ultrasound for better evaluation - fibroid vs malignancy  Not seeing GYN as outpt any longer given age  Patient/daughter made aware and will follow up as outpt for this.      Medical Problems       Resolved Problems  Date Reviewed: 10/17/2024   None       Discharging Physician / Practitioner: Lisandra Roberson PA-C  PCP: Bret Fan MD  Admission Date:   Admission Orders (From admission, onward)       Ordered        10/12/24 0957  INPATIENT ADMISSION  Once            10/11/24 1616  Place in Observation  Once                          Discharge Date: 10/17/24    Consultations During Hospital Stay:  Colorectal surgery    Procedures Performed:   CT abdomen and pelvis x2 as above    Significant Findings / Test Results:   As above    Incidental Findings:    Pelvic mass    Test Results Pending at Discharge (will require follow up):   none     Outpatient Tests Requested:  Pelvic ultrasound    Complications:      Reason for Admission: Abdominal pain    Hospital Course:   Ama Arteaga is a 79 y.o. female patient who originally presented to the hospital on 10/11/2024 due to abdominal pain.  Patient had recently been treated for diverticulitis.  CT again showed diverticulitis and this time was associated with severe sepsis criteria.  She was placed on IV fluids and IV Zosyn for 5  "days before transitioning to oral Augmentin today.  She was restricted on diet and advanced very slowly under the guidance of consultation by colorectal surgery.  She received appropriate electrolyte repletion and sepsis criteria resolved.  She was deemed to be stable to return home with outpatient colorectal follow-up      Please see above list of diagnoses and related plan for additional information.     Condition at Discharge: stable    Discharge Day Visit / Exam:   Subjective: Patient tolerating diet.  No abdominal pain or bleeding noted.  Feels ready to go home.  Vitals: Blood Pressure: 136/80 (10/17/24 0743)  Pulse: 83 (10/17/24 0743)  Temperature: 98.4 °F (36.9 °C) (10/17/24 0743)  Temp Source: Oral (10/16/24 2219)  Respirations: 18 (10/17/24 0743)  Height: 5' 2\" (157.5 cm) (10/11/24 1730)  Weight - Scale: 68.7 kg (151 lb 6.4 oz) (10/12/24 0449)  SpO2: 93 % (10/17/24 0743)  Physical Exam  Vitals reviewed.   Constitutional:       General: She is not in acute distress.     Appearance: Normal appearance. She is not ill-appearing, toxic-appearing or diaphoretic.   Eyes:      General: No scleral icterus.        Right eye: No discharge.         Left eye: No discharge.      Conjunctiva/sclera: Conjunctivae normal.   Cardiovascular:      Rate and Rhythm: Normal rate and regular rhythm.      Heart sounds: No murmur heard.  Pulmonary:      Effort: No respiratory distress.      Breath sounds: No stridor. No wheezing, rhonchi or rales.   Abdominal:      General: There is no distension.      Palpations: Abdomen is soft.      Tenderness: There is no abdominal tenderness. There is no guarding.   Musculoskeletal:      Right lower leg: No edema.      Left lower leg: No edema.   Skin:     General: Skin is warm and dry.      Coloration: Skin is not jaundiced or pale.      Findings: No bruising, erythema, lesion or rash.   Neurological:      General: No focal deficit present.      Mental Status: She is alert. Mental status is at " baseline.   Psychiatric:         Mood and Affect: Mood normal.          Discussion with Family: Updated  (daughter) via phone.    Discharge instructions/Information to patient and family:   See after visit summary for information provided to patient and family.      Provisions for Follow-Up Care:  See after visit summary for information related to follow-up care and any pertinent home health orders.      Mobility at time of Discharge:   Basic Mobility Inpatient Raw Score: 23  JH-HLM Goal: 7: Walk 25 feet or more  JH-HLM Achieved: 7: Walk 25 feet or more  HLM Goal achieved. Continue to encourage appropriate mobility.     Disposition:   Home    Planned Readmission:     Discharge Medications:  See after visit summary for reconciled discharge medications provided to patient and/or family.      Administrative Statements   Discharge Statement:  I have spent a total time of 35 minutes in caring for this patient on the day of the visit/encounter. .  Case discussed with case management.  Bedside nursing rounds performed    **Please Note: This note may have been constructed using a voice recognition system**

## 2024-10-17 NOTE — ASSESSMENT & PLAN NOTE
Lab Results   Component Value Date    HGBA1C 6.1 (H) 10/12/2024     Recent Labs     10/16/24  1111 10/16/24  1631 10/16/24  2151 10/17/24  0741   POCGLU 121 117 145* 122     Blood Sugar Average: Last 72 hrs:  (P) 100.375  Metformin on hold--can resume at discharge  Avoid hypoglycemia  Continue Accu-Cheks.  No need for sliding scale

## 2024-10-18 LAB
BACTERIA BLD CULT: NORMAL
BACTERIA BLD CULT: NORMAL

## 2024-11-05 ENCOUNTER — OFFICE VISIT (OUTPATIENT)
Dept: OBGYN CLINIC | Facility: CLINIC | Age: 79
End: 2024-11-05
Payer: MEDICARE

## 2024-11-05 VITALS
HEIGHT: 62 IN | SYSTOLIC BLOOD PRESSURE: 142 MMHG | WEIGHT: 145.6 LBS | BODY MASS INDEX: 26.79 KG/M2 | DIASTOLIC BLOOD PRESSURE: 66 MMHG

## 2024-11-05 DIAGNOSIS — R19.00 PELVIC MASS IN FEMALE: Primary | ICD-10-CM

## 2024-11-05 PROCEDURE — 99204 OFFICE O/P NEW MOD 45 MIN: CPT | Performed by: OBSTETRICS & GYNECOLOGY

## 2024-11-05 PROCEDURE — 88344 IMHCHEM/IMCYTCHM EA MLT ANTB: CPT | Performed by: PATHOLOGY

## 2024-11-05 PROCEDURE — 57500 BIOPSY OF CERVIX: CPT | Performed by: OBSTETRICS & GYNECOLOGY

## 2024-11-05 PROCEDURE — 88305 TISSUE EXAM BY PATHOLOGIST: CPT | Performed by: PATHOLOGY

## 2024-11-05 RX ORDER — NYSTATIN AND TRIAMCINOLONE ACETONIDE 100000; 1 [USP'U]/G; MG/G
1 OINTMENT TOPICAL 2 TIMES DAILY
COMMUNITY
Start: 2024-10-22

## 2024-11-05 NOTE — PROGRESS NOTES
"Assessment/Plan:     There are no diagnoses linked to this encounter.    79-year-old female  Cervical mass  Diverticulitis  Plan  Cervical biopsy done today  To go for pelvic ultrasound  Return to office to discuss results and management    Subjective:      Patient ID: Ama Arteaga is a 79 y.o. female.    HPI  80 yo female  here today sec to asbn finding on ct scan   Had h/o diverticulitis  multiple hospital visit    PMH diverticulitis CHTN , hypothyroid , anxiety, insomina\    The following portions of the patient's history were reviewed and updated as appropriate: allergies, current medications, past family history, past medical history, past social history, past surgical history and problem list.    Review of Systems      Objective:      /66 (BP Location: Left arm, Patient Position: Sitting, Cuff Size: Adult)   Ht 5' 2\" (1.575 m)   Wt 66 kg (145 lb 9.6 oz)   BMI 26.63 kg/m²     FINDINGS:     ABDOMEN     LOWER CHEST: Progression of bibasilar atelectasis, partially obscuring previously described nodular density in the right lower lobe. There is a small hiatal hernia.     LIVER/BILIARY TREE: Unremarkable.     GALLBLADDER: No calcified gallstones. No pericholecystic inflammatory change.     SPLEEN: Unremarkable.     PANCREAS: Unremarkable.     ADRENAL GLANDS: Stable left adrenal nodule.     KIDNEYS/URETERS: Redemonstration of a cyst at the lower pole of the right kidney. Both kidneys excrete contrast without hydronephrosis.     STOMACH AND BOWEL: There is redemonstration of a prominently distended sigmoid colon with stool. There are numerous diverticula present. However, there is progression of pericolonic fat stranding since the CT scan from 2 days ago. No definite bowel   perforation is seen. There is a nonspecific 9 mm short axis pericolonic lymph node (301/111). This may be reactive in nature. No bowel obstruction is seen.     APPENDIX: No findings to suggest appendicitis.     ABDOMINOPELVIC CAVITY: " There is a small amount of fluid in the pelvis. No pneumoperitoneum. No adenopathy. Nonspecific subcentimeter retroperitoneal lymph nodes are likely reactive.     VESSELS: Atherosclerosis without abdominal aortic aneurysm.     PELVIS     REPRODUCTIVE ORGANS: In the region of the lower uterine segment/vagina, there is a discrete 2.9 x 1.9 cm mass. (301/145). Cannot exclude a cervical mass or other lower uterine segment mass/fibroid. Would recommend a pelvic ultrasound for better   evaluation purposes.     URINARY BLADDER: Unremarkable.     ABDOMINAL WALL/INGUINAL REGIONS: Small fat-containing umbilical hernia.     BONES: No acute fracture or suspicious osseous lesion. Spinal degenerative changes. Lower lumbar fusion hardware.     IMPRESSION:     There is progression of pericolonic fat stranding and development of a small amount of fluid in the pelvis since the prior study. The findings may represent progression of colitis or diverticulitis. Redemonstration of a stool distended sigmoid colon.     Incidental 2.9 cm mass in the region of the lower uterine segment. Would recommend a nonemergent pelvic ultrasound for better evaluation purposes.     The study was marked in EPIC for immediate notification.     Physical Exam  Constitutional:       Appearance: She is well-developed.   Abdominal:      General: There is no distension.      Palpations: Abdomen is soft.      Tenderness: There is no abdominal tenderness.   Genitourinary:     Labia:         Right: No rash, tenderness or lesion.         Left: No rash, tenderness or lesion.       Vagina: No signs of injury. No vaginal discharge, erythema or tenderness.            Comments: 3x3 centimeters mass noted at the cervix biopsy obtained  Neurological:      Mental Status: She is alert and oriented to person, place, and time.   Psychiatric:         Behavior: Behavior normal.       Biopsy    Date/Time: 11/5/2024 4:45 PM    Performed by: Simin Fritz MD  Authorized by: Simin  "MD Catrina  Universal Protocol:  Consent: Verbal consent obtained.  Risks and benefits: risks, benefits and alternatives were discussed  Consent given by: patient  Time out: Immediately prior to procedure a \"time out\" was called to verify the correct patient, procedure, equipment, support staff and site/side marked as required.  Patient understanding: patient states understanding of the procedure being performed  Patient consent: the patient's understanding of the procedure matches consent given  Procedure consent: procedure consent matches procedure scheduled  Patient identity confirmed: verbally with patient    Procedure Details - Lesion Biopsy:     Body area:  Anogenital    Biopsy tissue type: mucous membrane     Speculum apply cervical mass noted  Verbal consent obtained  [facility] used to obtain biopsy biopsy removed and sent to pathology    [facility] used to obtain hemostasis good hemostasis noted patient tolerated procedure well      "

## 2024-11-06 ENCOUNTER — HOSPITAL ENCOUNTER (OUTPATIENT)
Dept: ULTRASOUND IMAGING | Facility: HOSPITAL | Age: 79
Discharge: HOME | End: 2024-11-06
Attending: OBSTETRICS & GYNECOLOGY
Payer: MEDICARE

## 2024-11-06 DIAGNOSIS — R19.00 PELVIC MASS IN FEMALE: ICD-10-CM

## 2024-11-06 PROCEDURE — 76856 US EXAM PELVIC COMPLETE: CPT

## 2024-11-06 PROCEDURE — 76830 TRANSVAGINAL US NON-OB: CPT

## 2024-11-12 PROBLEM — R65.20 SEVERE SEPSIS (HCC): Status: RESOLVED | Noted: 2024-10-12 | Resolved: 2024-11-12

## 2024-11-12 PROBLEM — A41.9 SEVERE SEPSIS (HCC): Status: RESOLVED | Noted: 2024-10-12 | Resolved: 2024-11-12

## 2024-11-13 NOTE — PROGRESS NOTES
Diagnoses and all orders for this visit:    Diverticulitis       Diverticulitis  Patient presents today for hospital follow-up after recent bouts of diverticulitis.  Patient had her third episode this year.  She was admitted to the hospital for almost a week.  She is still recovering from this but has no abdominal pain at present.  She had colonoscopy done this past year which did not show anything other than diverticuli.  She presents today for further workup.    In terms of her recent presentation, I have recommended transitioning from a low residual diet to a higher fiber diet to try to prevent recurrent diverticulitis.  We discussed management going forward.  We discussed maximal medical management which would be related to diet exercise and avoidance of tobacco use.  As she has had multiple episodes to include a recent episode all of which would be considered acute uncomplicated recurrent diverticulitis, we discussed the role of surgery as well.  She is medically well on the whole and should be reasonably fit for surgery.  I believe surgery is reasonable given the number of bouts to try to prevent future recurrences.  Recommendation is for minimally invasive sigmoid colectomy to be completed 2 to 3 months after most recent bout somewhere along the January timeframe.      ARGENIS Arteaga is here for a Diverticulitis surgery discussion, she states she is feeling well, this is her 3rd flare in the past few months.    She reports 1 bowel movement daily that is soft and formed, she denies rectal bleeding       She had a CT scan on 10/13/2024 that revealed:  There is progression of pericolonic fat stranding and development of a small amount of fluid in the pelvis since the prior study. The findings may represent progression of colitis or diverticulitis. Redemonstration of a stool distended sigmoid colon.     Incidental 2.9 cm mass in the region of the lower uterine segment. Would recommend a nonemergent pelvic  ultrasound for better evaluation purposes.      Her last Colonoscopy was 8/14/2024 by Dr. Rg and revealed:  The cecum appeared normal.  Extensive diverticulosis of severe severity in the transverse colon, descending colon and sigmoid colon  Subcentimeter polyp in the sigmoid colon was removed with cold forceps biopsy  Hemorrhoids          Past Medical History:   Diagnosis Date    Adrenal abnormality (HCC)     See endocrinologist for care-patient unsure of condition    Change in bowel habit     diarrhea-started end of November 2022    Chronic kidney disease     cyst on right kidney    Colon polyp     Diabetes (HCC)     Disease of thyroid gland     Diverticulitis     Dry heaves     hx of    Dysphagia     GERD (gastroesophageal reflux disease)     Hearing loss     wears B/L hearing aides    History of transfusion 1978    Tubal Pregnancy    Hyperlipidemia     Hypertension     Irregular heart beat     last saw Dr. Marshall 2 months ago: denies chest pain, SOB or palpitations per patient  2/22/23    Low back pain     Osteoporosis scoliosis    Skipped heart beats     checked by cardiologist (Dr. Marshall)-was told is no problem October 2021; also had ECHO and stress test    Tubal pregnancy      Past Surgical History:   Procedure Laterality Date    CARPAL TUNNEL RELEASE Bilateral     COLONOSCOPY      ECTOPIC PREGNANCY SURGERY      EGD      EYE SURGERY      Defuction of Right Eye Sac    NEUROPLASTY / TRANSPOSITION MEDIAN NERVE AT CARPAL TUNNEL BILATERAL      OSTEOTOMY TOES Left     2 and 3rd phalanges    OTHER SURGICAL HISTORY      Calcification removed from Right Thumb    [place] ARTHRODESIS COMBINED TQ 1NTRSPC LUMBAR N/A 11/01/2021    Procedure: Minimally invasive transverse lumbar interbody fusion L4-5 from left-sided approach with decompressive laminectomy;  Surgeon: Stanislav Ervin MD;  Location: [facility];  Service: Neurosurgery       Current Outpatient Medications:     acetaminophen (TYLENOL) 325 mg tablet, Take 2 tablets (650  mg total) by mouth every 6 (six) hours as needed for mild pain, Disp: , Rfl:     ALPRAZolam (XANAX) 0.25 mg tablet, Take 0.25 mg by mouth daily at bedtime as needed, Disp: , Rfl:     atorvastatin (LIPITOR) 20 mg tablet, Take 20 mg by mouth daily at bedtime , Disp: , Rfl:     levothyroxine 25 mcg tablet, Take 25 mcg by mouth daily in the early morning , Disp: , Rfl:     metoprolol succinate (TOPROL-XL) 50 mg 24 hr tablet, Take 50 mg by mouth daily at bedtime , Disp: , Rfl:     nystatin-triamcinolone (MYCOLOG-II) ointment, Apply 1 application. topically 2 (two) times a day, Disp: , Rfl:     polyethylene glycol (MIRALAX) 17 g packet, Take 17 g by mouth daily as needed (constipation), Disp: , Rfl:     triamcinolone (KENALOG) 0.1 % cream, APPLY TO RASH AREA TWICE DAILY ONLY WHEN FLARED APPLY BARRIER OINTMENT ON TOP, Disp: , Rfl:   Allergies as of 11/14/2024    (No Known Allergies)     Review of Systems   All other systems reviewed and are negative.    There were no vitals filed for this visit.  Physical Exam  Constitutional:       Appearance: Normal appearance.   HENT:      Head: Normocephalic and atraumatic.   Eyes:      Extraocular Movements: Extraocular movements intact.      Pupils: Pupils are equal, round, and reactive to light.   Pulmonary:      Effort: Pulmonary effort is normal.   Abdominal:      General: Abdomen is flat.      Palpations: Abdomen is soft.   Musculoskeletal:         General: Normal range of motion.   Skin:     General: Skin is warm and dry.   Neurological:      General: No focal deficit present.      Mental Status: She is alert and oriented to person, place, and time.   Psychiatric:         Mood and Affect: Mood normal.         Behavior: Behavior normal.         Thought Content: Thought content normal.         Judgment: Judgment normal.

## 2024-11-14 ENCOUNTER — OFFICE VISIT (OUTPATIENT)
Age: 79
End: 2024-11-14
Payer: MEDICARE

## 2024-11-14 VITALS — HEIGHT: 62 IN | WEIGHT: 144 LBS | BODY MASS INDEX: 26.5 KG/M2

## 2024-11-14 DIAGNOSIS — K57.92 DIVERTICULITIS: Primary | ICD-10-CM

## 2024-11-14 PROCEDURE — 88305 TISSUE EXAM BY PATHOLOGIST: CPT | Performed by: PATHOLOGY

## 2024-11-14 PROCEDURE — 99215 OFFICE O/P EST HI 40 MIN: CPT | Performed by: COLON & RECTAL SURGERY

## 2024-11-14 PROCEDURE — 88344 IMHCHEM/IMCYTCHM EA MLT ANTB: CPT | Performed by: PATHOLOGY

## 2024-11-14 RX ORDER — HEPARIN SODIUM 5000 [USP'U]/ML
5000 INJECTION, SOLUTION INTRAVENOUS; SUBCUTANEOUS ONCE
OUTPATIENT
Start: 2024-11-14 | End: 2024-11-14

## 2024-11-14 NOTE — LETTER
November 14, 2024     Bret Fan MD  [address]  [address]  [place] [address]    Patient: Ama Arteaga   YOB: 1945   Date of Visit: 11/14/2024       Dear Dr. Fan:    Thank you for referring Ama Arteaga to me for evaluation. Below are my notes for this consultation.    If you have questions, please do not hesitate to call me. I look forward to following your patient along with you.         Sincerely,        Stanislav Zarate MD        CC: No Recipients    Stanislav Zarate MD  11/14/2024  4:42 PM  Sign when Signing Visit  Diagnoses and all orders for this visit:    Diverticulitis       Diverticulitis  Patient presents today for hospital follow-up after recent bouts of diverticulitis.  Patient had her third episode this year.  She was admitted to the hospital for almost a week.  She is still recovering from this but has no abdominal pain at present.  She had colonoscopy done this past year which did not show anything other than diverticuli.  She presents today for further workup.    In terms of her recent presentation, I have recommended transitioning from a low residual diet to a higher fiber diet to try to prevent recurrent diverticulitis.  We discussed management going forward.  We discussed maximal medical management which would be related to diet exercise and avoidance of tobacco use.  As she has had multiple episodes to include a recent episode all of which would be considered acute uncomplicated recurrent diverticulitis, we discussed the role of surgery as well.  She is medically well on the whole and should be reasonably fit for surgery.  I believe surgery is reasonable given the number of bouts to try to prevent future recurrences.  Recommendation is for minimally invasive sigmoid colectomy to be completed 2 to 3 months after most recent bout somewhere along the January timeframe.      HPI  Ama Arteaga is here for a Diverticulitis surgery discussion, she states she is  feeling well, this is her 3rd flare in the past few months.    She reports 1 bowel movement daily that is soft and formed, she denies rectal bleeding       She had a CT scan on 10/13/2024 that revealed:  There is progression of pericolonic fat stranding and development of a small amount of fluid in the pelvis since the prior study. The findings may represent progression of colitis or diverticulitis. Redemonstration of a stool distended sigmoid colon.     Incidental 2.9 cm mass in the region of the lower uterine segment. Would recommend a nonemergent pelvic ultrasound for better evaluation purposes.      Her last Colonoscopy was 8/14/2024 by Dr. Rg and revealed:  The cecum appeared normal.  Extensive diverticulosis of severe severity in the transverse colon, descending colon and sigmoid colon  Subcentimeter polyp in the sigmoid colon was removed with cold forceps biopsy  Hemorrhoids          Past Medical History:   Diagnosis Date   • Adrenal abnormality (HCC)     See endocrinologist for care-patient unsure of condition   • Change in bowel habit     diarrhea-started end of November 2022   • Chronic kidney disease     cyst on right kidney   • Colon polyp    • Diabetes (HCC)    • Disease of thyroid gland    • Diverticulitis    • Dry heaves     hx of   • Dysphagia    • GERD (gastroesophageal reflux disease)    • Hearing loss     wears B/L hearing aides   • History of transfusion 1978    Tubal Pregnancy   • Hyperlipidemia    • Hypertension    • Irregular heart beat     last saw Dr. Marshall 2 months ago: denies chest pain, SOB or palpitations per patient  2/22/23   • Low back pain    • Osteoporosis scoliosis   • Skipped heart beats     checked by cardiologist (Dr. Marshall)-was told is no problem October 2021; also had ECHO and stress test   • Tubal pregnancy      Past Surgical History:   Procedure Laterality Date   • CARPAL TUNNEL RELEASE Bilateral    • COLONOSCOPY     • ECTOPIC PREGNANCY SURGERY     • EGD     • EYE SURGERY       Defuction of Right Eye Sac   • NEUROPLASTY / TRANSPOSITION MEDIAN NERVE AT CARPAL TUNNEL BILATERAL     • OSTEOTOMY TOES Left     2 and 3rd phalanges   • OTHER SURGICAL HISTORY      Calcification removed from Right Thumb   • [place] ARTHRODESIS COMBINED TQ 1NTRSPC LUMBAR N/A 11/01/2021    Procedure: Minimally invasive transverse lumbar interbody fusion L4-5 from left-sided approach with decompressive laminectomy;  Surgeon: Stanislav Ervin MD;  Location: [facility];  Service: Neurosurgery       Current Outpatient Medications:   •  acetaminophen (TYLENOL) 325 mg tablet, Take 2 tablets (650 mg total) by mouth every 6 (six) hours as needed for mild pain, Disp: , Rfl:   •  ALPRAZolam (XANAX) 0.25 mg tablet, Take 0.25 mg by mouth daily at bedtime as needed, Disp: , Rfl:   •  atorvastatin (LIPITOR) 20 mg tablet, Take 20 mg by mouth daily at bedtime , Disp: , Rfl:   •  levothyroxine 25 mcg tablet, Take 25 mcg by mouth daily in the early morning , Disp: , Rfl:   •  metoprolol succinate (TOPROL-XL) 50 mg 24 hr tablet, Take 50 mg by mouth daily at bedtime , Disp: , Rfl:   •  nystatin-triamcinolone (MYCOLOG-II) ointment, Apply 1 application. topically 2 (two) times a day, Disp: , Rfl:   •  polyethylene glycol (MIRALAX) 17 g packet, Take 17 g by mouth daily as needed (constipation), Disp: , Rfl:   •  triamcinolone (KENALOG) 0.1 % cream, APPLY TO RASH AREA TWICE DAILY ONLY WHEN FLARED APPLY BARRIER OINTMENT ON TOP, Disp: , Rfl:   Allergies as of 11/14/2024   • (No Known Allergies)     Review of Systems   All other systems reviewed and are negative.    There were no vitals filed for this visit.  Physical Exam  Constitutional:       Appearance: Normal appearance.   HENT:      Head: Normocephalic and atraumatic.   Eyes:      Extraocular Movements: Extraocular movements intact.      Pupils: Pupils are equal, round, and reactive to light.   Pulmonary:      Effort: Pulmonary effort is normal.   Abdominal:      General: Abdomen is flat.       Palpations: Abdomen is soft.   Musculoskeletal:         General: Normal range of motion.   Skin:     General: Skin is warm and dry.   Neurological:      General: No focal deficit present.      Mental Status: She is alert and oriented to person, place, and time.   Psychiatric:         Mood and Affect: Mood normal.         Behavior: Behavior normal.         Thought Content: Thought content normal.         Judgment: Judgment normal.

## 2024-11-14 NOTE — ASSESSMENT & PLAN NOTE
Patient presents today for hospital follow-up after recent bouts of diverticulitis.  Patient had her third episode this year.  She was admitted to the hospital for almost a week.  She is still recovering from this but has no abdominal pain at present.  She had colonoscopy done this past year which did not show anything other than diverticuli.  She presents today for further workup.    In terms of her recent presentation, I have recommended transitioning from a low residual diet to a higher fiber diet to try to prevent recurrent diverticulitis.  We discussed management going forward.  We discussed maximal medical management which would be related to diet exercise and avoidance of tobacco use.  As she has had multiple episodes to include a recent episode all of which would be considered acute uncomplicated recurrent diverticulitis, we discussed the role of surgery as well.  She is medically well on the whole and should be reasonably fit for surgery.  I believe surgery is reasonable given the number of bouts to try to prevent future recurrences.  Recommendation is for minimally invasive sigmoid colectomy to be completed 2 to 3 months after most recent bout somewhere along the January timeframe.

## 2024-11-15 ENCOUNTER — OFFICE VISIT (OUTPATIENT)
Dept: OBGYN CLINIC | Facility: CLINIC | Age: 79
End: 2024-11-15
Payer: MEDICARE

## 2024-11-15 ENCOUNTER — RESULTS FOLLOW-UP (OUTPATIENT)
Dept: OBGYN CLINIC | Facility: CLINIC | Age: 79
End: 2024-11-15

## 2024-11-15 VITALS
BODY MASS INDEX: 26.31 KG/M2 | WEIGHT: 143 LBS | HEIGHT: 62 IN | SYSTOLIC BLOOD PRESSURE: 126 MMHG | DIASTOLIC BLOOD PRESSURE: 60 MMHG

## 2024-11-15 DIAGNOSIS — R93.89 ABNORMAL PELVIC ULTRASOUND: Primary | ICD-10-CM

## 2024-11-15 DIAGNOSIS — K57.92 DIVERTICULITIS: Primary | ICD-10-CM

## 2024-11-15 DIAGNOSIS — N88.8 CERVICAL MASS: ICD-10-CM

## 2024-11-15 PROCEDURE — 99213 OFFICE O/P EST LOW 20 MIN: CPT | Performed by: OBSTETRICS & GYNECOLOGY

## 2024-11-15 RX ORDER — NEOMYCIN SULFATE 500 MG/1
TABLET ORAL
Qty: 4 TABLET | Refills: 0 | Status: SHIPPED | OUTPATIENT
Start: 2024-11-15 | End: 2024-11-15

## 2024-11-15 RX ORDER — METRONIDAZOLE 500 MG/1
TABLET ORAL
Qty: 2 TABLET | Refills: 0 | Status: SHIPPED | OUTPATIENT
Start: 2024-11-15 | End: 2024-11-15

## 2024-11-15 NOTE — LETTER
Ama Arteaga  [address] [facility] [address]      11/15/2024    Dear Ama Arteaga,    Your surgery is scheduled for: 1/22/2025     The hospital will call the evening prior to your surgery with your expected arrival time.   Location:[address] [address]     CHECK LIST PRIOR TO INPATIENT SURGERY  It is your responsibility to obtain any/all referrals needed for your surgery if required by your insurance. Our office will contact you to discuss your insurance coverage for this procedure.    Special instructions required if you are taking any blood thinners. Please verify with the prescribing physician. Examples include Coumadin, Plavix, Xarelto, Eliquis, Pradaxa, etc.    Please check with your family physician if you are taking the following medications: Aspirin or any Aspirin containing medication, Gingko biloba, Ginseng, Feverfew, and/or Danni’s Wort. We suggest stopping these for 3 days.     The night before and the day of your surgery, wash from your neck to groin with chlorhexidine soap.  This soap is available at most retail pharmacies under such brand names as Hibiclens, Endure or Aplicare.    Pre-admission testing Required: YES  X NO     If Yes, what type:  BLOOD-WORK X      BOWEL PREPARATION REQUIRED: YES X NO   If yes, start date: 1/21/2025. Please see attached bowel preparation instructions.    NOTHING TO EAT OR DRINK AFTER MIDNIGHT PRIOR TO SURGERY.    Please do not hesitate to call our office with any questions regarding your surgery.       MIRALAX/GATORADE SURGERY PREPARATION INSTRUCTIONS    Purchase:   (1) 238g bottle of MiraLax or Glycolax - no prescription needed   4 Dulcolax Laxative Tablets - no prescription needed   64 oz. bottle of Gatorade (NOT RED), Crystal Light, or another clear liquid of  choice.   **REMEMBER** A prescription for antibiotics has been called into your pharmacy for  prior to your surgery.    One Day Prior to  Surgery  Have a normal breakfast, light lunch, and clear liquids thereafter.  It is important that you drink plenty of clear liquids throughout the day to prevent dehydration.  CLEAR LIQUIDS INCLUDE:  Water, Clear Fruit Juice (Apple, Cranberry, Grape), Black Coffee, Tea, Ginger Ale, Gatorade, Cruz-Aid, Hi-C, plain Jello, Ice Pops, Clear Broth or Bouillon, Crystal Light, Lemonade, Soda (regular or diet).    No Milk Products!    At 1:00 pm, take (4) Dulcolax Tablets with 8 oz. of water.  Swallow the tablets whole with a full glass of water.  The package may direct you not to exceed (2) tablets at any time but for the purpose of this examination, you should take (4).    At 1:00 pm, take your first dose of antibiotic as prescribed.    At 1:00 pm, Mix the 238g bottle of MiraLax in 64 oz. of Gatorade and shake the solution until the MiraLax is dissolved.  Drink 8 oz. glassfuls at your own pace.  It may take 3-4 hours to drink all the solution.    At 10:00 pm, take your last dose of antibiotic as prescribed.    REMEMBER TO STAY CLOSE TO TOILET FACILITIES.    The Day of Surgery  Take nothing by mouth until after surgery.                              Post-Operative Care Information  Abdominal Surgery  What are my post-operative instructions?   The following information and instructions are for your continued care after discharge from the hospital. Please read the information (including the After Visit Summary provided to you at the time of discharge) carefully. If you have any questions or concerns, please contact the clinical staff at [phone]    How do I take care of my incision?  Your incision(s) may have surgical glue, dissolvable sutures with white pieces of tape called steri strips, or staples.   If you have steri strips or surgical glue, do not remove them. Steri strips will fall off naturally in 7-10 days. Surgical glue (Exofin) will fall off over a period of up to 2-3 weeks.   Do not put any topical ointments or  lotions on the incisions.   Avoid tight clothing around your incision(s) or fabric that may irritate your skin such as wool, hooks and julio.     Should I continue taking my prescribed medications?   Take medications as prescribed. Please review the After Visit Summary provided to you at the time of discharge for details.     How will I manage my pain at home?  For best pain control take your pain medication at regular intervals for the first couple of days, about every 4-6 hours. This will prevent pain build-up, which occurs when medication is taken on an as needed basis.   Use only as much prescription pain medication as you need (i.e. 1 tablet instead of 2).  Taper off the prescription pain medication as pain decreases, stopping narcotics first.   Use over-the-counter acetaminophen (Tylenolâ) if your doctor allows. When using over-the-counter medication, never use more than what is prescribed on the package directions. Do not take more than 3000mg of Tylenolâ in a 24 hour period. Do not take more than 2400mg of Ibuprofen (such as Motrinâ or Advilâ) in a 24 hour period.   While taking prescription pain medication you may not drive, work, or drink alcohol.     Are there any diet restrictions?   Continue low fiber diet up to 1 week post op.  (This allows the bowel to rest)    It is normal for bowel movements to be loose and occur more frequently post-operatively. This should improve over time.  During this time pay attention to hydration  1.5 weeks post op you may slowly begin to add fiber back into your diet.    By 2 weeks post op you may resume a normal high fiber diet.     What activity restrictions will I have?   Walk as much as tolerated.  Do not lift, pull, or push objects greater than 10 pounds for 6 weeks. This includes vacuuming, lifting children or groceries, walking the dog, mowing lawns, snow shoveling, etc. Please discuss other specific physical activities with the doctor at your post op appointment.    Do not drive while taking pain medications. You may drive when you have no pain - usually in 1-2 weeks following surgery.   You may climb stairs in moderation but do not overtire.  Resume sexual activity after you are cleared by your doctor.     When will I receive follow-up care?   You will be scheduled to return to see your physician in the office typically in 3-4 weeks after surgery.    If you do not have a scheduled appointment at the time of discharge, please call the office at [phone].    When should I call my doctor?   Notify your doctor for any of the following signs and symptoms of possible infection:   Temperature above 101 degrees.   Increase in pain or discomfort.   Redness, swelling, drainage at incision site(s). A small amount of clear yellow or pinkish-yellow drainage is normal  If incision begins to open.     Call if you have any changes in your overall health such as having:   Nausea   Vomiting   Chills   profuse (excessive) sweating   inability to urinate or completely empty bladder   diarrhea   constipation

## 2024-11-15 NOTE — LETTER
Ama Arteaga  [address] [facility] [address]      11/15/2024    Dear Ama Arteaga,    Your surgery is scheduled for: 1/22/2025     The hospital will call the evening prior to your surgery with your expected arrival time.   Location:[address] [address]    CHECK LIST PRIOR TO INPATIENT SURGERY  It is your responsibility to obtain any/all referrals needed for your surgery if required by your insurance. Our office will contact you to discuss your insurance coverage for this procedure.    Special instructions required if you are taking any blood thinners. Please verify with the prescribing physician. Examples include Coumadin, Plavix, Xarelto, Eliquis, Pradaxa, etc.    Please check with your family physician if you are taking the following medications: Aspirin or any Aspirin containing medication, Gingko biloba, Ginseng, Feverfew, and/or Danni’s Wort. We suggest stopping these for 3 days.     The night before and the day of your surgery, wash from your neck to groin with chlorhexidine soap.  This soap is available at most retail pharmacies under such brand names as Hibiclens, Endure or Aplicare.    Pre-admission testing Required: YES  X NO     If Yes, what type:  BLOOD-WORK X      BOWEL PREPARATION REQUIRED: YES X NO   If yes, start date: 1/21/2025. Please see attached bowel preparation instructions.    NOTHING TO EAT OR DRINK AFTER MIDNIGHT PRIOR TO SURGERY.    Please do not hesitate to call our office with any questions regarding your surgery.                   MIRALAX/GATORADE SURGERY PREPARATION INSTRUCTIONS    Purchase:   (1) 238g bottle of MiraLax or Glycolax - no prescription needed   4 Dulcolax Laxative Tablets - no prescription needed   64 oz. bottle of Gatorade (NOT RED), Crystal Light, or another clear liquid of  choice.   **REMEMBER** A prescription for antibiotics has been called into your pharmacy for  prior to your surgery.    One Day  Prior to Surgery  Have a normal breakfast, light lunch, and clear liquids thereafter.  It is important that you drink plenty of clear liquids throughout the day to prevent dehydration.  CLEAR LIQUIDS INCLUDE:  Water, Clear Fruit Juice (Apple, Cranberry, Grape), Black Coffee, Tea, Ginger Ale, Gatorade, Cruz-Aid, Hi-C, plain Jello, Ice Pops, Clear Broth or Bouillon, Crystal Light, Lemonade, Soda (regular or diet).    No Milk Products!    At 1:00 pm, take (4) Dulcolax Tablets with 8 oz. of water.  Swallow the tablets whole with a full glass of water.  The package may direct you not to exceed (2) tablets at any time but for the purpose of this examination, you should take (4).    At 1:00 pm, take your first dose of antibiotic as prescribed.    At 1:00 pm, Mix the 238g bottle of MiraLax in 64 oz. of Gatorade and shake the solution until the MiraLax is dissolved.  Drink 8 oz. glassfuls at your own pace.  It may take 3-4 hours to drink all the solution.    At 10:00 pm, take your last dose of antibiotic as prescribed.    REMEMBER TO STAY CLOSE TO TOILET FACILITIES.    The Day of Surgery  Take nothing by mouth until after surgery.                                            Post-Operative Care Information  Abdominal Surgery  What are my post-operative instructions?   The following information and instructions are for your continued care after discharge from the hospital. Please read the information (including the After Visit Summary provided to you at the time of discharge) carefully. If you have any questions or concerns, please contact the clinical staff at [phone]    How do I take care of my incision?  Your incision(s) may have surgical glue, dissolvable sutures with white pieces of tape called steri strips, or staples.   If you have steri strips or surgical glue, do not remove them. Steri strips will fall off naturally in 7-10 days. Surgical glue (Exofin) will fall off over a period of up to 2-3 weeks.   Do not put  any topical ointments or lotions on the incisions.   Avoid tight clothing around your incision(s) or fabric that may irritate your skin such as wool, hooks and julio.     Should I continue taking my prescribed medications?   Take medications as prescribed. Please review the After Visit Summary provided to you at the time of discharge for details.     How will I manage my pain at home?  For best pain control take your pain medication at regular intervals for the first couple of days, about every 4-6 hours. This will prevent pain build-up, which occurs when medication is taken on an as needed basis.   Use only as much prescription pain medication as you need (i.e. 1 tablet instead of 2).  Taper off the prescription pain medication as pain decreases, stopping narcotics first.   Use over-the-counter acetaminophen (Tylenolâ) if your doctor allows. When using over-the-counter medication, never use more than what is prescribed on the package directions. Do not take more than 3000mg of Tylenolâ in a 24 hour period. Do not take more than 2400mg of Ibuprofen (such as Motrinâ or Advilâ) in a 24 hour period.   While taking prescription pain medication you may not drive, work, or drink alcohol.     Are there any diet restrictions?   Continue low fiber diet up to 1 week post op.  (This allows the bowel to rest)    It is normal for bowel movements to be loose and occur more frequently post-operatively. This should improve over time.  During this time pay attention to hydration  1.5 weeks post op you may slowly begin to add fiber back into your diet.    By 2 weeks post op you may resume a normal high fiber diet.     What activity restrictions will I have?   Walk as much as tolerated.  Do not lift, pull, or push objects greater than 10 pounds for 6 weeks. This includes vacuuming, lifting children or groceries, walking the dog, mowing lawns, snow shoveling, etc. Please discuss other specific physical activities with the doctor at  your post op appointment.   Do not drive while taking pain medications. You may drive when you have no pain - usually in 1-2 weeks following surgery.   You may climb stairs in moderation but do not overtire.  Resume sexual activity after you are cleared by your doctor.     When will I receive follow-up care?   You will be scheduled to return to see your physician in the office typically in 3-4 weeks after surgery.    If you do not have a scheduled appointment at the time of discharge, please call the office at [phone].    When should I call my doctor?   Notify your doctor for any of the following signs and symptoms of possible infection:   Temperature above 101 degrees.   Increase in pain or discomfort.   Redness, swelling, drainage at incision site(s). A small amount of clear yellow or pinkish-yellow drainage is normal  If incision begins to open.     Call if you have any changes in your overall health such as having:   Nausea   Vomiting   Chills   profuse (excessive) sweating   inability to urinate or completely empty bladder   diarrhea   constipation

## 2024-11-15 NOTE — LETTER
Ama Arteaga  [address] [facility] [address]      Medicine Instructions for Adults with Diabetes who Need a Bowel Prep       Follow these instructions when a BOWEL PREP is required for your procedure or surgery!    NOTE:   GLP-1 Agonists taken weekly: do not take in the 7 days before your procedure   SGLT-2 Inhibitors: do not take in the 4 days before your procedure     On the Day Before Surgery/Procedure  If you are having a procedure (e.g. Colonoscopy) or surgery that requires a bowel prep and you may have at least a clear liquid diet, follow the directions below based on the type of medicine you take for your diabetes.     Type of Medicine You Take Examples What to do   Pre-Mixed Insulin - Intermediate Acting Humalog® 75/25, Humulin® 70/30, Novolog® 70/30, Regular Insulin Take ½ your regular dose the evening before your procedure   Rapid/Fast Acting Insulin Humalog® U200, NovoLog®, Apidra®, Fiasp® Take ½ your regular dose the evening before your procedure.   Long-Acting Insulin Lantus®, Levemir®, Tresiba®, Toujeo®, Basaglar® Take your FULL regular dose the day before procedure   Oral Sulfonylurea Glipizide/Glimepiride/Glucotrol® Take ½ your regular dose the evening before your procedure   Other Oral Diabetes Medicines Metformin®, Glucophage®, Glucophage XR®, Riomet®, Glumetza®), Actose®, Avandia®, Glyset®, Prandin® Take your regular dose with dinner in the evening before your procedure   GLP-1 Agonists AdlyxinÒ, ByettaÒ, BydureonÒ, OzempicÒ, SoliquaÒ, TanzeumÒ, TrulicityÒ, VictozaÒ, Saxenda®, Rybelsus® If taken daily, take as normal    If taken weekly, do not take this medicine for 7 days before your procedure including the day of the procedure (resume taking after the procedure)   SGLT-2 Inhibitors Jardiance®, Invokana®, Farxiga®,   Steglatro®, Brenzavvy®, Qtern®, Segluromet®, Glyxambi®, Synjardy®, Synjardy XR®, Invokamet®, Invokamet XR®, Trijary XR®, Xigduo XR®, Steglujan® Do not take for 4 days before  your procedure including the day of the procedure (resume taking after the procedure)                More information continued on back                    Medicine Instructions for Adults with Diabetes who Need a Bowel Prep  Page 2      On the Day of Surgery/Procedure  Follow the directions below based on the type of medicine you take for your diabetes.     Type of Medicine You Take Examples What to do   Long-Acting Insulin Lantus®, Levemir®, Tresiba®, Toujeo®, Basaglar®, Semglee®   If you usually take your Long-Acting Insulin in the morning, take the full dose as scheduled.   GLP-1 Agonists AdlyxinÒ, ByettaÒ, BydureonÒ, OzempicÒ, SoliquaÒ, TanzeumÒ, TrulicityÒ, VictozaÒ, Saxenda®, Rybelsus® Do NOT take this medicine on the day of your procedure (resume taking after the procedure)       On the Day of Surgery/Procedure (continued)  Except for the morning Long-Acting Insulin, DO NOT take ANY diabetic medicine on the day of your procedure unless you were instructed by the doctor who manages your diabetes medicines.    Continue to check your blood sugars.  If you have an insulin pump, ask your endocrinologist for instructions at least 3 days before your procedure. NOTE: If you are not able to ask your endocrinologist in advance, on the day of the procedure set your insulin pump to your basal rate only. Bring your insulin pump supplies to the hospital.     If you have any questions about taking your diabetes medicines prior to your procedure, please contact the doctor who manages your diabetes medicines.

## 2024-11-15 NOTE — TELEPHONE ENCOUNTER
----- Message from Stanislav Zarate MD sent at 11/14/2024  4:44 PM EST -----  Please schedule robotic sigmoid colectomy for January

## 2024-11-15 NOTE — PROGRESS NOTES
Assessment/Plan:     There are no diagnoses linked to this encounter.      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 female.    HPI  Patient seen evaluated presented office today with her daughters to discuss biopsy results and management option for abnormal pelvic ultrasound as well as cervical mass noted at the time of the exam  Biopsy was obtained from the cervical mass and came back  Final Diagnosis   A. Cervix, Cervical biopsy:  - Squamous epithelium with atypia.  Focally increased p16 expression is seen by immunostain.  Ki-67 immunostain does not show significantly increased proliferative activity  - Benign endocervical mucosa   - Mild acute inflammation       UTERUS:  The uterus is retroverted in position, measuring 4.8 x 3.0 x 3.1 cm.  The uterus has a normal contour and echotexture. There is a 1.9 x 1.8 x 3.0 cm ill-defined hypoechoic cervical mass.        ENDOMETRIUM:  The endometrial echo complex has an AP caliber of 8.0 mm.        OVARIES/ADNEXA:  Right ovary: 1.5 x 1.0 x 1.3 cm. 1.0 mL.  Ovarian Doppler flow is within normal limits.  No suspicious ovarian or adnexal abnormality. Subcentimeter simple cyst, for which no follow-up is recommended.     Left ovary: Ovary not seen.     OTHER:  Small volume free fluid in the pelvis.     IMPRESSION:     Ill-defined hypoechoic cervical mass measuring 3 cm. Correlate with pathologic results in process.     Endometrium measures up to 8 mm. Although any postmenopausal bleeding may be related to the cervical mass, consider tissue sampling if not already  "performed.       Ultrasound result reviewed and discussed with patient as well as biopsy result based on the finding of atypia recommend removal of the cervical mass noted on her cervix as well as recommend to proceed with hysteroscopy D&C evaluation of the endometrium cavity both procedure reviewed and discussed with patient risk of bleeding, infection, blood transfusion, and perforation and need another surgery reviewed and discussed with patient  Explained to the patient and her daughters if pathology came back positive for any abnormality she may need future surgery and referral  All patient questions answered in details and patient was satisfied    The following portions of the patient's history were reviewed and updated as appropriate: allergies, current medications, past family history, past medical history, past social history, past surgical history and problem list.    Review of Systems      Objective:      /60 (BP Location: Left arm, Patient Position: Sitting, Cuff Size: Adult)   Ht 5' 2\" (1.575 m)   Wt 64.9 kg (143 lb)   BMI 26.16 kg/m²          Physical Exam  Constitutional:       Appearance: She is well-developed.   Cardiovascular:      Rate and Rhythm: Normal rate and regular rhythm.      Heart sounds: Normal heart sounds.   Pulmonary:      Effort: Pulmonary effort is normal.      Breath sounds: Normal breath sounds.   Abdominal:      General: There is no distension.      Palpations: Abdomen is soft.      Tenderness: There is no abdominal tenderness.   Neurological:      Mental Status: She is alert and oriented to person, place, and time.   Psychiatric:         Behavior: Behavior normal.         "

## 2024-11-15 NOTE — TELEPHONE ENCOUNTER
Patient scheduled for surgery 1/22/25  at [facility]. Instructions and PAT's gone over with and mailed to the patient.

## 2024-11-19 ENCOUNTER — TELEPHONE (OUTPATIENT)
Dept: OBGYN CLINIC | Facility: CLINIC | Age: 79
End: 2024-11-19

## 2024-11-19 NOTE — TELEPHONE ENCOUNTER
----- Message from Simin Fritz MD sent at 11/15/2024 12:02 PM EST -----    2024  Hys D&C REMOVAL OF CERVICAL MASS    [facility] GYN Department  Surgery Scheduling Sheet    Patient Name: Ama Arteaga  : 1945    Provider: Simin Fritz MD     Needed: no; Language: N/A    Procedure: exam under anesthesia and hysteroscopy dilation and curettage and removal of cervical mass    Diagnosis: cervical mass and abn pelvic u/S    Special Needs or Equipment: symphion    Anesthesia: IV sedation with anesthesia    Length of stay: outpatient  Does patient have comorbid conditions that will require close perioperative monitoring prior to safe discharge: no    -The patient has comorbid conditions that will require close perioperative monitoring prior to safe discharge, including N/A.   -This may require acute care beyond the usual and routine recovery period. As such, inpatient admission post-operatively is expected and appropriate, and anticipated hospital length of stay will be >2 midnights.    Pre-Admission Testing Needed: no   Labs that should be ordered: N/A    Order PAT that is recommended in prep for procedure?: Not Indicated    Medical Clearance Needed: no; Provider: N/A    MA Form Signed (tubals/hysterectomy): Not Indicated    Surgical Drink Given: no      How many days out of work: 2 day(s)   How many days no drivin day(s)       Is pre op appt needed?  no  Interval for post op appt: 2 week(s)     For Surgical Scheduler:     Surgery Scheduled On: MON. 24-MORNING  [place]: [facility]    Pre-op Appt: COMPLETED ON 11/15/24  Post op Appt: 12/10/24  Consult/Medical clearance appt: N/A

## 2024-11-20 NOTE — PRE-PROCEDURE INSTRUCTIONS
Pre-Surgery Instructions:   Medication Instructions    acetaminophen (TYLENOL) 325 mg tablet Uses PRN- OK to take day of surgery    ALPRAZolam (XANAX) 0.25 mg tablet Take night before surgery    atorvastatin (LIPITOR) 20 mg tablet Take night before surgery    B Complex Vitamins (VITAMIN B COMPLEX PO) Stop taking 7 days prior to surgery.    Coenzyme Q10 (CO Q 10 PO) Stop taking 7 days prior to surgery.    levothyroxine 25 mcg tablet Take day of surgery.    metFORMIN (GLUCOPHAGE) 500 mg tablet Hold day of surgery.    metoprolol succinate (TOPROL-XL) 50 mg 24 hr tablet Take night before surgery    polyethylene glycol (MIRALAX) 17 g packet Hold day of surgery.    VITAMIN D PO Hold day of surgery.    Medication instructions for day surgery reviewed. Please use only a sip of water to take your instructed medications. Avoid all over the counter vitamins, supplements and NSAIDS for one week prior to surgery per anesthesia guidelines. Tylenol is ok to take as needed.     You will receive a call one business day prior to surgery with an arrival time and hospital directions. If your surgery is scheduled on a Monday, the hospital will be calling you on the Friday prior to your surgery. If you have not heard from anyone by 8pm, please call the hospital supervisor through the hospital  at [phone]. ([place] [phone] or [place] [phone]).    Do not eat or drink anything after midnight the night before your surgery, including candy, mints, lifesavers, or chewing gum. Do not drink alcohol 24hrs before your surgery. Try not to smoke at least 24hrs before your surgery.       Follow the pre surgery showering instructions as listed in the “My Surgical Experience Booklet” or otherwise provided by your surgeon's office. Do not use a blade to shave the surgical area 1 week before surgery. It is okay to use a clean electric clippers up to 24 hours before surgery. Do not apply any lotions, creams, including makeup,  cologne, deodorant, or perfumes after showering on the day of your surgery. Do not use dry shampoo, hair spray, hair gel, or any type of hair products.     No contact lenses, eye make-up, or artificial eyelashes. Remove nail polish, including gel polish, and any artificial, gel, or acrylic nails if possible. Remove all jewelry including rings and body piercing jewelry.     Wear causal clothing that is easy to take on and off. Consider your type of surgery.    Keep any valuables, jewelry, piercings at home. Please bring any specially ordered equipment (sling, braces) if indicated.    Arrange for a responsible person to drive you to and from the hospital on the day of your surgery. Please confirm the visitor policy for the day of your procedure when you receive your phone call with an arrival time.     Call the surgeon's office with any new illnesses, exposures, or additional questions prior to surgery.    Please reference your “My Surgical Experience Booklet” for additional information to prepare for your upcoming surgery.

## 2024-11-25 ENCOUNTER — ANESTHESIA EVENT (OUTPATIENT)
Dept: PERIOP | Facility: HOSPITAL | Age: 79
End: 2024-11-25
Payer: MEDICARE

## 2024-11-25 ENCOUNTER — ANESTHESIA (OUTPATIENT)
Dept: PERIOP | Facility: HOSPITAL | Age: 79
End: 2024-11-25
Payer: MEDICARE

## 2024-11-25 ENCOUNTER — HOSPITAL ENCOUNTER (OUTPATIENT)
Facility: HOSPITAL | Age: 79
Setting detail: OUTPATIENT SURGERY
Discharge: HOME | End: 2024-11-25
Attending: OBSTETRICS & GYNECOLOGY | Admitting: OBSTETRICS & GYNECOLOGY
Payer: MEDICARE

## 2024-11-25 VITALS
WEIGHT: 140 LBS | HEART RATE: 78 BPM | TEMPERATURE: 97.8 F | DIASTOLIC BLOOD PRESSURE: 63 MMHG | RESPIRATION RATE: 18 BRPM | HEIGHT: 62 IN | OXYGEN SATURATION: 94 % | SYSTOLIC BLOOD PRESSURE: 133 MMHG | BODY MASS INDEX: 25.76 KG/M2

## 2024-11-25 DIAGNOSIS — N88.8 CERVICAL MASS: ICD-10-CM

## 2024-11-25 DIAGNOSIS — R93.89 ABNORMAL PELVIC ULTRASOUND: ICD-10-CM

## 2024-11-25 PROBLEM — E87.6 HYPOKALEMIA: Status: RESOLVED | Noted: 2021-11-08 | Resolved: 2024-11-25

## 2024-11-25 PROBLEM — E87.1 HYPONATREMIA: Status: RESOLVED | Noted: 2024-10-12 | Resolved: 2024-11-25

## 2024-11-25 PROBLEM — E03.9 HYPOTHYROID: Status: ACTIVE | Noted: 2024-11-25

## 2024-11-25 PROBLEM — R11.2 INTRACTABLE NAUSEA AND VOMITING: Status: RESOLVED | Noted: 2021-11-08 | Resolved: 2024-11-25

## 2024-11-25 LAB — GLUCOSE SERPL-MCNC: 103 MG/DL (ref 65–140)

## 2024-11-25 PROCEDURE — 88342 IMHCHEM/IMCYTCHM 1ST ANTB: CPT | Performed by: PATHOLOGY

## 2024-11-25 PROCEDURE — 88360 TUMOR IMMUNOHISTOCHEM/MANUAL: CPT | Performed by: PATHOLOGY

## 2024-11-25 PROCEDURE — 82948 REAGENT STRIP/BLOOD GLUCOSE: CPT

## 2024-11-25 PROCEDURE — 88341 IMHCHEM/IMCYTCHM EA ADD ANTB: CPT | Performed by: PATHOLOGY

## 2024-11-25 PROCEDURE — NC001 PR NO CHARGE: Performed by: OBSTETRICS & GYNECOLOGY

## 2024-11-25 PROCEDURE — 58558 HYSTEROSCOPY BIOPSY: CPT | Performed by: OBSTETRICS & GYNECOLOGY

## 2024-11-25 PROCEDURE — 57522 CONIZATION OF CERVIX: CPT | Performed by: OBSTETRICS & GYNECOLOGY

## 2024-11-25 PROCEDURE — 88305 TISSUE EXAM BY PATHOLOGIST: CPT | Performed by: PATHOLOGY

## 2024-11-25 RX ORDER — PROPOFOL 10 MG/ML
INJECTION, EMULSION INTRAVENOUS CONTINUOUS PRN
Status: DISCONTINUED | OUTPATIENT
Start: 2024-11-25 | End: 2024-11-25

## 2024-11-25 RX ORDER — MAGNESIUM HYDROXIDE 1200 MG/15ML
LIQUID ORAL AS NEEDED
Status: DISCONTINUED | OUTPATIENT
Start: 2024-11-25 | End: 2024-11-25 | Stop reason: HOSPADM

## 2024-11-25 RX ORDER — ONDANSETRON 2 MG/ML
4 INJECTION INTRAMUSCULAR; INTRAVENOUS ONCE AS NEEDED
Status: DISCONTINUED | OUTPATIENT
Start: 2024-11-25 | End: 2024-11-25 | Stop reason: HOSPADM

## 2024-11-25 RX ORDER — PROPOFOL 10 MG/ML
INJECTION, EMULSION INTRAVENOUS AS NEEDED
Status: DISCONTINUED | OUTPATIENT
Start: 2024-11-25 | End: 2024-11-25

## 2024-11-25 RX ORDER — HYDROMORPHONE HCL IN WATER/PF 6 MG/30 ML
0.2 PATIENT CONTROLLED ANALGESIA SYRINGE INTRAVENOUS
Status: DISCONTINUED | OUTPATIENT
Start: 2024-11-25 | End: 2024-11-25 | Stop reason: HOSPADM

## 2024-11-25 RX ORDER — DEXAMETHASONE SODIUM PHOSPHATE 10 MG/ML
INJECTION, SOLUTION INTRAMUSCULAR; INTRAVENOUS AS NEEDED
Status: DISCONTINUED | OUTPATIENT
Start: 2024-11-25 | End: 2024-11-25

## 2024-11-25 RX ORDER — LIDOCAINE HYDROCHLORIDE 10 MG/ML
INJECTION, SOLUTION EPIDURAL; INFILTRATION; INTRACAUDAL; PERINEURAL AS NEEDED
Status: DISCONTINUED | OUTPATIENT
Start: 2024-11-25 | End: 2024-11-25

## 2024-11-25 RX ORDER — LIDOCAINE HYDROCHLORIDE 10 MG/ML
0.5 INJECTION, SOLUTION EPIDURAL; INFILTRATION; INTRACAUDAL; PERINEURAL ONCE AS NEEDED
Status: DISCONTINUED | OUTPATIENT
Start: 2024-11-25 | End: 2024-11-25 | Stop reason: HOSPADM

## 2024-11-25 RX ORDER — FENTANYL CITRATE 50 UG/ML
INJECTION, SOLUTION INTRAMUSCULAR; INTRAVENOUS AS NEEDED
Status: DISCONTINUED | OUTPATIENT
Start: 2024-11-25 | End: 2024-11-25

## 2024-11-25 RX ORDER — KETOROLAC TROMETHAMINE 30 MG/ML
15 INJECTION, SOLUTION INTRAMUSCULAR; INTRAVENOUS ONCE
Status: COMPLETED | OUTPATIENT
Start: 2024-11-25 | End: 2024-11-25

## 2024-11-25 RX ORDER — SODIUM CHLORIDE, SODIUM LACTATE, POTASSIUM CHLORIDE, CALCIUM CHLORIDE 600; 310; 30; 20 MG/100ML; MG/100ML; MG/100ML; MG/100ML
INJECTION, SOLUTION INTRAVENOUS CONTINUOUS PRN
Status: DISCONTINUED | OUTPATIENT
Start: 2024-11-25 | End: 2024-11-25

## 2024-11-25 RX ORDER — ONDANSETRON 2 MG/ML
INJECTION INTRAMUSCULAR; INTRAVENOUS AS NEEDED
Status: DISCONTINUED | OUTPATIENT
Start: 2024-11-25 | End: 2024-11-25

## 2024-11-25 RX ORDER — FENTANYL CITRATE/PF 50 MCG/ML
25 SYRINGE (ML) INJECTION
Status: DISCONTINUED | OUTPATIENT
Start: 2024-11-25 | End: 2024-11-25 | Stop reason: HOSPADM

## 2024-11-25 RX ORDER — SODIUM CHLORIDE, SODIUM LACTATE, POTASSIUM CHLORIDE, CALCIUM CHLORIDE 600; 310; 30; 20 MG/100ML; MG/100ML; MG/100ML; MG/100ML
125 INJECTION, SOLUTION INTRAVENOUS CONTINUOUS
Status: DISCONTINUED | OUTPATIENT
Start: 2024-11-25 | End: 2024-11-25 | Stop reason: HOSPADM

## 2024-11-25 RX ADMIN — PROPOFOL 60 MCG/KG/MIN: 10 INJECTION, EMULSION INTRAVENOUS at 10:00

## 2024-11-25 RX ADMIN — KETOROLAC TROMETHAMINE 15 MG: 30 INJECTION, SOLUTION INTRAMUSCULAR at 11:00

## 2024-11-25 RX ADMIN — SODIUM CHLORIDE, SODIUM LACTATE, POTASSIUM CHLORIDE, AND CALCIUM CHLORIDE 125 ML/HR: .6; .31; .03; .02 INJECTION, SOLUTION INTRAVENOUS at 09:32

## 2024-11-25 RX ADMIN — LIDOCAINE HYDROCHLORIDE 50 MG: 10 INJECTION, SOLUTION EPIDURAL; INFILTRATION; INTRACAUDAL; PERINEURAL at 10:00

## 2024-11-25 RX ADMIN — FENTANYL CITRATE 25 MCG: 50 INJECTION INTRAMUSCULAR; INTRAVENOUS at 09:56

## 2024-11-25 RX ADMIN — SODIUM CHLORIDE, SODIUM LACTATE, POTASSIUM CHLORIDE, AND CALCIUM CHLORIDE: .6; .31; .03; .02 INJECTION, SOLUTION INTRAVENOUS at 09:54

## 2024-11-25 RX ADMIN — FENTANYL CITRATE 50 MCG: 50 INJECTION INTRAMUSCULAR; INTRAVENOUS at 10:00

## 2024-11-25 RX ADMIN — FENTANYL CITRATE 25 MCG: 50 INJECTION INTRAMUSCULAR; INTRAVENOUS at 09:58

## 2024-11-25 RX ADMIN — ONDANSETRON 4 MG: 2 INJECTION INTRAMUSCULAR; INTRAVENOUS at 10:21

## 2024-11-25 RX ADMIN — DEXAMETHASONE SODIUM PHOSPHATE 5 MG: 10 INJECTION, SOLUTION INTRAMUSCULAR; INTRAVENOUS at 10:03

## 2024-11-25 RX ADMIN — PROPOFOL 50 MG: 10 INJECTION, EMULSION INTRAVENOUS at 10:00

## 2024-11-25 NOTE — ANESTHESIA POSTPROCEDURE EVALUATION
Post-Op Assessment Note    Last Filed PACU Vitals:  Vitals Value Taken Time   Temp 98.8 °F (37.1 °C) 11/25/24 1040   Pulse 79 11/25/24 1102   /58 11/25/24 1100   Resp 14 11/25/24 1102   SpO2 94 % 11/25/24 1102   Vitals shown include unfiled device data.    Modified Sumeet:  Activity: 2 (11/25/2024 11:36 AM)  Respiration: 2 (11/25/2024 11:36 AM)  Circulation: 2 (11/25/2024 11:36 AM)  Consciousness: 2 (11/25/2024 11:36 AM)  Oxygen Saturation: 2 (11/25/2024 11:36 AM)  Modified Sumeet Score: 10 (11/25/2024 11:36 AM)

## 2024-11-25 NOTE — ANESTHESIA PREPROCEDURE EVALUATION
Procedure:  EXAM UNDER ANESTHESIA; DILATATION AND CURETTAGE (D&C) WITH HYSTEROSCOPY. REMOVAL OF CERVICAL MASS. (Uterus)    Relevant Problems   ANESTHESIA   (-) History of anesthesia complications      CARDIO   (+) Hypertension   (+) Mixed hyperlipidemia   (-) Chest pain   (-) COLE (dyspnea on exertion)      ENDO   (+) Controlled type 2 diabetes mellitus with complication, without long-term current use of insulin (HCC)   (+) Hypothyroid      MUSCULOSKELETAL   (+) Neurogenic claudication due to lumbar spinal stenosis   (+) Scoliosis deformity of spine      NEURO/PSYCH   (+) Chronic right shoulder pain   (+) Neurogenic claudication due to lumbar spinal stenosis      PULMONARY   (-) Shortness of breath   (-) Sleep apnea   (-) URI (upper respiratory infection)        Physical Exam    Airway    Mallampati score: II  TM Distance: >3 FB  Neck ROM: full     Dental    lower dentures    Cardiovascular      Pulmonary      Other Findings  post-pubertal.      Anesthesia Plan  ASA Score- 2     Anesthesia Type- IV sedation with anesthesia with ASA Monitors.         Additional Monitors:     Airway Plan:            Plan Factors-Exercise tolerance (METS): >4 METS.    Chart reviewed. EKG reviewed.  Existing labs reviewed. Patient summary reviewed.                  Induction- intravenous.    Postoperative Plan-         Informed Consent- Anesthetic plan and risks discussed with patient.  I personally reviewed this patient with the CRNA. Discussed and agreed on the Anesthesia Plan with the CRNA..

## 2024-11-25 NOTE — INTERVAL H&P NOTE
H&P reviewed. After examining the patient I find no changes in the patients condition since the H&P had been written.    Vitals:    11/25/24 0919   BP: 128/69   Pulse: 86   Resp: 18   Temp: 97.8 °F (36.6 °C)   SpO2: 95%

## 2024-11-25 NOTE — H&P
79-year-old female  Cervical mass biopsy came back squamous epithelium with atypia  Abnormal pelvic ultrasound elevated endometrial thickness  Diverticulitis required surgery in January  Plan  Will schedule for removal of cervical mass and hysteroscopy D&C evaluation of the endometrial cavity procedure explained discussed with patient all patient questions answered and patient was satisfied        Subjective:      Subjective  Patient ID: Ama Arteaga is a 79 y.o. female.     HPI  Patient seen evaluated presented office today with her daughters to discuss biopsy results and management option for abnormal pelvic ultrasound as well as cervical mass noted at the time of the exam  Biopsy was obtained from the cervical mass and came back  Final Diagnosis   A. Cervix, Cervical biopsy:  - Squamous epithelium with atypia.  Focally increased p16 expression is seen by immunostain.  Ki-67 immunostain does not show significantly increased proliferative activity  - Benign endocervical mucosa   - Mild acute inflammation         UTERUS:  The uterus is retroverted in position, measuring 4.8 x 3.0 x 3.1 cm.  The uterus has a normal contour and echotexture. There is a 1.9 x 1.8 x 3.0 cm ill-defined hypoechoic cervical mass.        ENDOMETRIUM:  The endometrial echo complex has an AP caliber of 8.0 mm.        OVARIES/ADNEXA:  Right ovary: 1.5 x 1.0 x 1.3 cm. 1.0 mL.  Ovarian Doppler flow is within normal limits.  No suspicious ovarian or adnexal abnormality. Subcentimeter simple cyst, for which no follow-up is recommended.     Left ovary: Ovary not seen.     OTHER:  Small volume free fluid in the pelvis.     IMPRESSION:     Ill-defined hypoechoic cervical mass measuring 3 cm. Correlate with pathologic results in process.     Endometrium measures up to 8 mm. Although any postmenopausal bleeding may be related to the cervical mass, consider tissue sampling if not already performed.        Ultrasound result reviewed and discussed with  "patient as well as biopsy result based on the finding of atypia recommend removal of the cervical mass noted on her cervix as well as recommend to proceed with hysteroscopy D&C evaluation of the endometrium cavity both procedure reviewed and discussed with patient risk of bleeding, infection, blood transfusion, and perforation and need another surgery reviewed and discussed with patient  Explained to the patient and her daughters if pathology came back positive for any abnormality she may need future surgery and referral  All patient questions answered in details and patient was satisfied     The following portions of the patient's history were reviewed and updated as appropriate: allergies, current medications, past family history, past medical history, past social history, past surgical history and problem list.     Review of Systems        Objective:        /60 (BP Location: Left arm, Patient Position: Sitting, Cuff Size: Adult)   Ht 5' 2\" (1.575 m)   Wt 64.9 kg (143 lb)   BMI 26.16 kg/m²            Objective[]Expand by Default  Physical Exam  Constitutional:       Appearance: She is well-developed.   Cardiovascular:      Rate and Rhythm: Normal rate and regular rhythm.      Heart sounds: Normal heart sounds.   Pulmonary:      Effort: Pulmonary effort is normal.      Breath sounds: Normal breath sounds.   Abdominal:      General: There is no distension.      Palpations: Abdomen is soft.      Tenderness: There is no abdominal tenderness.   Neurological:      Mental Status: She is alert and oriented to person, place, and time.   Psychiatric:         Behavior: Behavior normal.      "

## 2024-11-25 NOTE — ANESTHESIA POSTPROCEDURE EVALUATION
Post-Op Assessment Note    CV Status:  Stable  Pain Score: 0    Pain management: adequate       Mental Status:  Alert and awake   Hydration Status:  Stable   PONV Controlled:  None   Airway Patency:  Patent     Post Op Vitals Reviewed: Yes    No anethesia notable event occurred.    Staff: CRNA           Last Filed PACU Vitals:  Vitals Value Taken Time   Temp     Pulse 91 11/25/24 1038   BP     Resp 19 11/25/24 1038   SpO2 96 % 11/25/24 1038   Vitals shown include unfiled device data.    Modified Sumeet:  No data recorded

## 2024-11-25 NOTE — OP NOTE
OPERATIVE REPORT  PATIENT NAME: Ama Arteaga    :  1945  MRN: [id number]  Pt Location:  OR ROOM 01    SURGERY DATE: 2024    Surgeons and Role:     * Simin Fritz MD - Primary    Preop Diagnosis:  Cervical mass [N88.8]  Abnormal pelvic ultrasound [R93.89]    Post-Op Diagnosis Codes:     * Cervical mass [N88.8]     * Abnormal pelvic ultrasound [R93.89]    Procedure(s):  EXAM UNDER ANESTHESIA; DILATATION AND CURETTAGE (D&C) WITH HYSTEROSCOPY. REMOVAL OF CERVICAL MASS.    Specimen(s):  ID Type Source Tests Collected by Time Destination   1 : cervical mass Tissue Cervix TISSUE EXAM Simin Fritz MD 2024 10:11 AM    2 : endometrial curetage Tissue Endometrium TISSUE EXAM Simin Fritz MD 2024 10:20 AM        Estimated Blood Loss:   Minimal    Drains:  * No LDAs found *    Anesthesia Type:   IV Sedation with Anesthesia    Operative Indications:  Cervical mass [N88.8]  Abnormal pelvic ultrasound [R93.89]      Operative Findings:  Cervical mass noted 4 x 3 cm coming from the posterior lip of the cervix  Small cavity noted with both fallopian tube ostia were visualized  Good hemostasis noted at the end of the procedure      Complications:   None    Procedure and Technique:  Brief History    All risks, benefits, and alternatives to the procedure were discussed with the patient and she had the opportunity to ask questions. Informed consent was obtained.     Description of Procedure    Patient was taken to the operating room were a time out was performed to confirm correct patient and correct procedure. MAC sedation  was administered and the patient was positioned on the OR table in the dorsal lithotomy position. All pressure points were padded and a carolann hugger was placed to maintain control of core body temperature. A bimanual exam was performed and the uterus was noted to be anteverted, normal in size and consistency with no palpable adnexal masses or fullness. The patient was prepped and  draped in the usual sterile fashion.    Operative Technique    Patient voided prior going to the OR. A Huff retractor was inserted into the vagina and [place] retractor was used to visualize the anterior lip of the cervix, which was then grasped with 3-0 vicryl suture     Mass noted on the posterior lip of the cervix 4 x 3 cm  Lesion was removed using the cautery after vasopressin 10 cc was injected 20 cc in the 100 dilution  Base of the lesion was cut using the cautery Bovie 50-50 then part of the retained tissue was removed using loop 20 x 8 cm hemostasis obtained using cautery and Monsel solution    The cervix was serially dilated with hydrodissection for introduction of the hysteroscope.     Hysteroscope was introduced under direct visualization using normal saline solution as the distention media. Hysteroscope was advanced to the uterine fundus and the entire uterine cavity was inspected in a systematic manner. There was noted to be smooth cavity both fallopian tube ostia were visualized. Hysteroscope was withdrawn and sharp curetting was performed, starting at the 12'oclock position and rotating a total of 360 degrees to cover all surfaces. Endometrial tissue was obtained and sent for pathology. The Allis clamp was removed from the anterior lip of the cervix. Good hemostasis was confirmed, retractor removed from the vagina. Patient moved to the dorsal supine position.    At the conclusion of the procedure, all needle, sponge, and instrument counts were noted to be correct x2. Patient tolerated the procedure well and was transferred to PACU in stable condition prior to discharge with follow up in 1-2 weeks.     I was present for the entire procedure.    Patient Disposition:  PACU              SIGNATURE: Simin Fritz MD  DATE: November 25, 2024  TIME: 10:45 AM

## 2024-12-04 PROCEDURE — 88341 IMHCHEM/IMCYTCHM EA ADD ANTB: CPT | Performed by: PATHOLOGY

## 2024-12-04 PROCEDURE — 88305 TISSUE EXAM BY PATHOLOGIST: CPT | Performed by: PATHOLOGY

## 2024-12-04 PROCEDURE — 88360 TUMOR IMMUNOHISTOCHEM/MANUAL: CPT | Performed by: PATHOLOGY

## 2024-12-04 PROCEDURE — 88342 IMHCHEM/IMCYTCHM 1ST ANTB: CPT | Performed by: PATHOLOGY

## 2024-12-06 ENCOUNTER — TELEPHONE (OUTPATIENT)
Dept: ANESTHESIOLOGY | Facility: CLINIC | Age: 79
End: 2024-12-06

## 2024-12-06 DIAGNOSIS — Z01.89 ENCOUNTER FOR GERIATRIC ASSESSMENT: Primary | ICD-10-CM

## 2024-12-10 ENCOUNTER — OFFICE VISIT (OUTPATIENT)
Dept: OBGYN CLINIC | Facility: CLINIC | Age: 79
End: 2024-12-10

## 2024-12-10 VITALS
WEIGHT: 144 LBS | DIASTOLIC BLOOD PRESSURE: 76 MMHG | HEIGHT: 62 IN | SYSTOLIC BLOOD PRESSURE: 132 MMHG | BODY MASS INDEX: 26.5 KG/M2

## 2024-12-10 DIAGNOSIS — Z12.4 SCREENING FOR CERVICAL CANCER: Primary | ICD-10-CM

## 2024-12-10 PROCEDURE — G0145 SCR C/V CYTO,THINLAYER,RESCR: HCPCS | Performed by: OBSTETRICS & GYNECOLOGY

## 2024-12-10 PROCEDURE — 99024 POSTOP FOLLOW-UP VISIT: CPT | Performed by: OBSTETRICS & GYNECOLOGY

## 2024-12-10 PROCEDURE — G0476 HPV COMBO ASSAY CA SCREEN: HCPCS | Performed by: OBSTETRICS & GYNECOLOGY

## 2024-12-10 RX ORDER — METFORMIN HYDROCHLORIDE 500 MG/1
500 TABLET, EXTENDED RELEASE ORAL 2 TIMES DAILY WITH MEALS
COMMUNITY
Start: 2024-12-02

## 2024-12-11 LAB
HPV HR 12 DNA CVX QL NAA+PROBE: NEGATIVE
HPV16 DNA CVX QL NAA+PROBE: NEGATIVE
HPV18 DNA CVX QL NAA+PROBE: NEGATIVE

## 2024-12-11 NOTE — PROGRESS NOTES
Assessment/Plan:     Diagnoses and all orders for this visit:    Screening for cervical cancer  -     Liquid-based pap, screening    Other orders  -     metFORMIN (GLUCOPHAGE-XR) 500 mg 24 hr tablet; Take 500 mg by mouth 2 (two) times a day with meals     79-year-old female  Cervical mass status post removal pathology fibroid uterus  Elevated endometrial thickness benign pathology  Diverticulitis scheduled for surgery in January  Plan  Pap and HPV done today secondary to atypical cells noted at the time of this biopsy from the cervical mass  Return to office in 6 months for follow-up    Subjective:      Patient ID: Ama Arteaga is a 79 y.o. female.    HPI  Patient seen evaluated presents to the office today for postop visit cervical mass removal and hysteroscopy D&C  Pathology result reviewed and discussed with patient  Denies any complaint today    A. Cervix, cervical mass:  -Circumscribed spindle cell neoplasm without significant nuclear atypia or necrosis , consistent with cervical leiomyoma, 4.0 cm. See note  -Mild chronic cervicitis and Nabothian cysts  -Portion of lower endometrium present  -Changes of previous biopsy site  Note:  Tumor cells are  positive for  caldesmon, smooth muscle actin and negative for  CKC, Ki-67. Controls reacted appropriately This staining pattern supports the above diagnosis.         B. Endometrium, endometrial curetage:  -Blood, mucin  -Detached strips of crushed endometrial cells  -No evidence of malignancy \\\    Prior biopsy done on the cervical mass came back with    A. Cervix, Cervical biopsy:  - Squamous epithelium with atypia.  Focally increased p16 expression is seen by immunostain.  Ki-67 immunostain does not show significantly increased proliferative activity  - Benign endocervical mucosa   - Mild acute inflammation  After comparing both result and discussed with GYN oncologist recommendations to proceed with Pap and HPV  If reassuring no further testing needed in the  "meantime  Finding reviewed and discussed with patient Pap and HPV will be obtained today and return to office in 6 months for follow-up plan explained and discussed with patient all patient questions answered patient was satisfied    The following portions of the patient's history were reviewed and updated as appropriate: allergies, current medications, past family history, past medical history, past social history, past surgical history and problem list.    Review of Systems      Objective:      /76 (BP Location: Left arm, Patient Position: Sitting, Cuff Size: Adult)   Ht 5' 2\" (1.575 m)   Wt 65.3 kg (144 lb)   BMI 26.34 kg/m²          Physical Exam  Constitutional:       Appearance: She is well-developed.   Abdominal:      General: There is no distension.      Palpations: Abdomen is soft.      Tenderness: There is no abdominal tenderness.   Genitourinary:     Labia:         Right: No rash, tenderness or lesion.         Left: No rash, tenderness or lesion.       Vagina: No signs of injury. No vaginal discharge, erythema or tenderness.      Cervix: No cervical motion tenderness, discharge or friability.      Adnexa:         Right: No mass, tenderness or fullness.          Left: No mass, tenderness or fullness.        Comments: Pap and HPV obtained today  Neurological:      Mental Status: She is alert and oriented to person, place, and time.   Psychiatric:         Behavior: Behavior normal.         "

## 2024-12-16 ENCOUNTER — RESULTS FOLLOW-UP (OUTPATIENT)
Dept: OBGYN CLINIC | Facility: CLINIC | Age: 79
End: 2024-12-16

## 2024-12-16 LAB
LAB AP GYN PRIMARY INTERPRETATION: NORMAL
Lab: NORMAL

## 2025-01-02 PROBLEM — F51.04 CHRONIC INSOMNIA: Chronic | Status: ACTIVE | Noted: 2020-03-02

## 2025-01-02 PROBLEM — M16.11 PRIMARY LOCALIZED OSTEOARTHRITIS OF RIGHT HIP: Status: ACTIVE | Noted: 2020-09-02

## 2025-01-02 PROBLEM — G56.03 BILATERAL CARPAL TUNNEL SYNDROME: Status: ACTIVE | Noted: 2020-09-02

## 2025-01-02 PROBLEM — M17.11 PRIMARY LOCALIZED OSTEOARTHRITIS OF RIGHT KNEE: Status: ACTIVE | Noted: 2020-09-02

## 2025-01-02 PROBLEM — Z01.89 ENCOUNTER FOR GERIATRIC ASSESSMENT: Status: ACTIVE | Noted: 2025-01-02

## 2025-01-02 PROBLEM — E55.9 VITAMIN D DEFICIENCY: Chronic | Status: ACTIVE | Noted: 2020-03-02

## 2025-01-02 PROBLEM — R42 VERTIGO: Status: ACTIVE | Noted: 2024-07-31

## 2025-01-02 PROBLEM — M85.80 OSTEOPENIA: Status: ACTIVE | Noted: 2020-09-08

## 2025-01-03 ENCOUNTER — TELEPHONE (OUTPATIENT)
Age: 80
End: 2025-01-03

## 2025-01-03 NOTE — TELEPHONE ENCOUNTER
Cat Kurtz RN  P [facility] Rect[facility] Surgery Coordinator  Pt is requesting information about pre-op antibiotics for the day before surgery. She did not get them. Please reach out to Pt.  Thanks    Spoke to [facility] and they did not have antibiotics for pt, gave verbally to pharmacist. Spoke with pt to inform pharmacy has flagyl and neomycin prescriptions now and she can  prior to surgery.

## 2025-01-03 NOTE — PATIENT INSTRUCTIONS
As always we discussed having your BEST surgery, and BEST recovery.  Surgery goals reviewed today.      Breathing  Patient was encouraged to begin lung exercises today.  This could be accomplished through deep breathing and cough exercises.  Patient was taught how to use an incentive spirometer.  Return demonstration provided.      Eating/nutrition   Encouraged patient to increase oral protein intake prior to surgery.  Based on current weight, the patient was instructed to consume an additional 30 grams of protein per day leading up to their surgical date.  This can be accomplished by consuming chicken, fish, tuna fish, cottage cheese, cheese, eggs, Greek yogurt, and protein shakes as needed.  I encouraged use of protein shakes such ENLIVE/Ensure/Boost.  I also recommended making your own protein shakes with protein powder.     Sleep/Stress management  Patient was encouraged to rest their body prior to surgery.  Encouraged attempting to get 8 hours of sleep at night.  Avoid stress.  Avoid sick contacts.  Encouraged to find a relaxing hobby such as reading, meditation, listening to music.    Training exercises  Patient was encouraged to remain active as possible.  Today bilateral lower extremity generic exercises were taught for muscle strengthening and balance.  All exercises to be done sitting down.

## 2025-01-03 NOTE — PROGRESS NOTES
[facility]      Reason for Visit: Pre-operative Evaluation for Risk Stratification and Optimization    Patient ID: Ama Arteaga is a 79 y.o. female.     The Patient is located at Home and in the following state in which I hold an active license [place]    The patient was identified by name and date of birth Ama Arteaga was informed that this is a telemedicine visit and that the visit is being conducted through Telephone.  My office door was closed and no one else was in the room.  Patient acknowledged consent and understanding of privacy and security of the video platform. The patient has agreed to participate and understands they can discontinue the visit at any time.    Patient is aware this is a billable service.      Surgery:   Case: [id number] Date/Time: 01/22/25 1110   Procedure: SIGMOID RESECTION COLON LAPAROSCOPIC W ROBOTICS (Abdomen)   Anesthesia type: General   Diagnosis: Diverticulitis [K57.92]   Pre-op diagnosis: Diverticulitis [K57.92]   Location:  OR ROOM 14 / [facility]   Surgeons: Stanislav Zarate MD          Assessment    Pre-operative Medical Evaluation for planned surgery  Recommendations as listed in PLAN section below      Assessment & Plan  Encounter for geriatric assessment  TUG <15 sec: N/A  Falls (last 6 months): No  Davy Total Score: 22  PHQ- 9 Depression Scale: 0  Nutrition Assessment Score:  13  METS: 8.33  DX SLEEP APNEA;  No  SCORE: 2  Health goals:  -What are your overall health goals? Not current     -What brings you strength? Family     -What activities are important to you? Walking the mall  Diverticulitis    Controlled type 2 diabetes mellitus with complication, without long-term current use of insulin (Formerly [facility])    Lab Results   Component Value Date    HGBA1C 6.1 (H) 10/12/2024     Hypertension, unspecified type           Plan:     1. Further preoperative workup as follows:   -needs labs/ekg    2. Preoperative  "Medication Management Review performed by PAT nursing  YES        PRE-OP WORKSHEET DATA  Assessment of Pre-Operative Risks     SOC Quality Hard Stops:    SSI Risk:  BMI (>40) : Estimated body mass index is 26.34 kg/m² as calculated from the following:    Height as of 12/10/24: 5' 2\" (1.575 m).    Weight as of 12/10/24: 65.3 kg (144 lb). (if >than 40 then offer weight management)  -Weight management consult No    HbA1c (<8.0) :   Lab Results   Component Value Date    HGBA1C 6.1 (H) 10/12/2024    (>8.0 recommend Endocrine consult)   -endocrine consult indicated No    Tobacco use:  No     -lifestyle management consult No    SOC Anemia review:  Hgb ( >11):   Lab Results   Component Value Date    HGB 11.6 10/16/2024    HGB 10.8 (L) 10/15/2024    HGB 11.1 (L) 10/14/2024      -IV venofer indicated  No    TE risk:  GFR (>60) (Less then 45 = Nephrology consult):    Lab Results   Component Value Date    EGFR 82 10/17/2024    EGFR 89 10/16/2024    EGFR 92 10/15/2024      -Nephrology consult indicated No    GERIATRIC ASSESSMENT Yes  Mini-Cognitive Screen (MCI) score <2    inpatient geriatric consult Yes   -ordered delirium precautions on admit No  Depression Screen   Falls (yes within the last 6 months)   -Fall precautions ordered on admit Yes  Mini Nutritional Assessment (MNA) (score <12)   -Estimated body mass index is 26.34 kg/m² as calculated from the following:    Height as of 12/10/24: 5' 2\" (1.575 m).    Weight as of 12/10/24: 65.3 kg (144 lb).   -order inpatient nutrition evaluation No    Obstructive Sleep Apnea Screening  -sleep medicine referral needed No      Active Decompensated Chronic Conditions which would delay surgery  No acutely decompensated medical issues such as recent CVA, MI, new onset arrhythmia, severe aortic stenosis, CHF, uncontrolled COPD       Functional capacity:   METS (<4) 8.33   -<4 MET send to SOC DOC for evaluation     Carb Drinks given by SOC   No              Subjective:           History " of Present Illness:     Ama Arteaga is a 79 y.o. female who presents to the office today for a preoperative screening. They are electing to undergo planned procedure with an understanding that all surgery has inherent risk. Today they present for preoperative risk assessment and recommendations for optimization in preparation for surgery.  We have reviewed their past medical/surgical/social history as listed below, their most recent labs and EKG, and labs were also reviewed and recommendations are as below.     Pt had multiple bouts of diverticulitis the past year and has opted for surgical intervention. She also recently had a cervical mass removed as well and did well. No post operative issues          ROS:      Denies fevers and denies chills  Denies congestion and denies sore throat  Denies chest pain  Denies palpitations  Denies shortness of breath  Denies abdominal pain  Denies nausea, vomiting, and diarrhea  Denies any issues with their skin... example NO open wounds or sores  Denies rashes  Denies difficulty urinating  Denies any issues with their urine... example a dark color or an odor  Denies dizziness  Denies headaches  Denies confusion and denies hallucinations    Admits to being in well health today       Objective:    There were no vitals taken for this visit.      Physical Exam    Connected via phone  Pt was alert and oriented x 4  Answered questions appropriately  No evidence of distress         The following portions of the patient's history were reviewed and updated as appropriate: allergies, current medications, past family history, past medical history, past social history, past surgical history and problem list.     Past History:       Past Medical History:   Diagnosis Date    Adrenal abnormality (HCC)     See endocrinologist for care-patient unsure of condition    Change in bowel habit     diarrhea-started end of November 2022    Chronic kidney disease     cyst on right kidney    Colon polyp      Diabetes (HCC)     Disease of thyroid gland     Diverticulitis     Dry heaves     hx of    Dysphagia     GERD (gastroesophageal reflux disease)     Hearing loss     wears B/L hearing aides    History of transfusion     Tubal Pregnancy, no reactions    Hyperlipidemia     Hypertension     Irregular heart beat     last saw Dr. Marshall 2 months ago: denies chest pain, SOB or palpitations per patient  23    Low back pain     Osteoporosis scoliosis    Skipped heart beats     checked by cardiologist (Dr. Marshall)-was told is no problem 2021; also had ECHO and stress test    Tubal pregnancy     Past Surgical History:   Procedure Laterality Date    CARPAL TUNNEL RELEASE Bilateral     COLONOSCOPY      ECTOPIC PREGNANCY SURGERY      EGD      EYE SURGERY      Defuction of Right Eye Sac    NEUROPLASTY / TRANSPOSITION MEDIAN NERVE AT CARPAL TUNNEL BILATERAL      OSTEOTOMY TOES Left     2 and 3rd phalanges    OTHER SURGICAL HISTORY      Calcification removed from Right Thumb    [place] ARTHRODESIS COMBINED TQ 1NTRSPC LUMBAR N/A 2021    Procedure: Minimally invasive transverse lumbar interbody fusion L4-5 from left-sided approach with decompressive laminectomy;  Surgeon: Stanislav Ervin MD;  Location:  MAIN OR;  Service: Neurosurgery    MD HYSTEROSCOPY BX ENDOMETRIUM&/POLYPC W/WO D&C N/A 2024    Procedure: EXAM UNDER ANESTHESIA; DILATATION AND CURETTAGE (D&C) WITH HYSTEROSCOPY. REMOVAL OF CERVICAL MASS.;  Surgeon: Simin Fritz MD;  Location:  MAIN OR;  Service: Gynecology          Social History     Tobacco Use    Smoking status: Former     Current packs/day: 0.00     Types: Cigarettes     Quit date:      Years since quittin.0    Smokeless tobacco: Never    Tobacco comments:     quit 40 years ago   Vaping Use    Vaping status: Never Used   Substance Use Topics    Alcohol use: Not Currently    Drug use: Not Currently     Family History   Problem Relation Age of Onset    Leukemia Mother      Stomach cancer Father     Aneurysm Brother     Lung cancer Brother     Kidney cancer Brother     COPD Brother     Aneurysm Brother     Anuerysm Brother     Hernia Brother     Lung cancer Brother           Allergies:     No Known Allergies     Current Medications:     Current Outpatient Medications   Medication Instructions    acetaminophen (TYLENOL) 650 mg, Oral, Every 6 hours PRN    ALPRAZolam (XANAX) 0.25 mg, Daily at bedtime PRN    atorvastatin (LIPITOR) 20 mg, Daily at bedtime    B Complex Vitamins (VITAMIN B COMPLEX PO) Take by mouth    Coenzyme Q10 (CO Q 10 PO) Take by mouth    levothyroxine 25 mcg, Daily (early morning)    metFORMIN (GLUCOPHAGE-XR) 500 mg, 2 times daily with meals    metoprolol succinate (TOPROL-XL) 50 mg, Daily at bedtime    polyethylene glycol (MIRALAX) 17 g, Oral, Daily PRN    VITAMIN D PO Take by mouth           Assessment of intra and post operative respiratory, hemodynamic and thrombotic risks     Prior Anesthesia Reactions: No     Personal history of venous thromboembolic disease? No    History of steroid use > 5 mg for >2 weeks within last year? No      The patient's risk factors for cardiac complications include :  none    Ama Arteaga has an IN HOSPITAL cardiac risk of RCI RISK CLASS I (0 risk factors, risk of major cardiac compl. appr. 0.5%) based on RCRI calculator    Cardiac Risk Estimation: per the Revised Cardiac Risk Index (Circ. 100:1043, 1999),        Pre-Op Data Reviewed:       Laboratory Results: I have personally reviewed the pertinent laboratory results/reports     EKG:I have personally reviewed pertinent reports.  . I personally reviewed and interpreted available tracings in the electronic medical record    Encounter Date: 10/11/24   ECG 12 lead   Result Value    Ventricular Rate 73    Atrial Rate 73    NC Interval 172    QRSD Interval 72    QT Interval 396    QTC Interval 436    P Axis 71    QRS Axis 46    T Wave Axis 44    Narrative    Normal sinus rhythm  Normal  ECG  When compared with ECG of 22-SEP-2024 08:46,  Criteria for Septal infarct are no longer Present  Confirmed by Ian Willett ([id number]) on 10/11/2024 4:50:49 PM       OLD RECORDS: reviewed old records in the chart review section if EHR on day of visit.    Previous cardiopulmonary studies within the past year:  Echocardiogram: no   Cardiac Catheterization: no  Stress Test: no      Time of visit including pre-visit chart review, visit and post-visit coordination of plan and care , review of pre-surgical lab work, preparation and time spent documenting note in electronic medical record, time spent face-to-face in physical examination answering patient questions by care team 45 minutes             [facility]

## 2025-01-03 NOTE — PRE-PROCEDURE INSTRUCTIONS
Pre-Surgery Instructions:   Medication Instructions    acetaminophen (TYLENOL) 325 mg tablet Uses PRN- OK to take day of surgery    ALPRAZolam (XANAX) 0.25 mg tablet Uses PRN- DO NOT take day of surgery    atorvastatin (LIPITOR) 20 mg tablet Take night before surgery    B Complex Vitamins (VITAMIN B COMPLEX PO) Stop taking 7 days prior to surgery.    Coenzyme Q10 (CO Q 10 PO) Stop taking 7 days prior to surgery.    levothyroxine 25 mcg tablet Take day of surgery.    metFORMIN (GLUCOPHAGE-XR) 500 mg 24 hr tablet Hold day of surgery.    metoprolol succinate (TOPROL-XL) 50 mg 24 hr tablet Take night before surgery    polyethylene glycol (MIRALAX) 17 g packet Uses PRN- DO NOT take day of surgery    VITAMIN D PO Stop taking 7 days prior to surgery.      Pt confirmed Bowel Prep and instructions given by Surgical office-will call office with questions.    Medication instructions for day surgery reviewed. Please use only a sip of water to take your instructed medications. Avoid all over the counter vitamins, supplements and NSAIDS for one week prior to surgery per anesthesia guidelines. Tylenol is ok to take as needed.     You will receive a call one business day prior to surgery with an arrival time and hospital directions. If your surgery is scheduled on a Monday, the hospital will be calling you on the Friday prior to your surgery. If you have not heard from anyone by 8pm, please call the hospital supervisor through the hospital  at [phone]. ([place] [phone] or [place] [phone]).    Do not eat or drink anything after midnight the night before your surgery, including candy, mints, lifesavers, or chewing gum. Do not drink alcohol 24hrs before your surgery. Try not to smoke at least 24hrs before your surgery.       Follow the pre surgery showering instructions as listed in the “My Surgical Experience Booklet” or otherwise provided by your surgeon's office. Do not use a blade to shave the surgical  area 1 week before surgery. It is okay to use a clean electric clippers up to 24 hours before surgery. Do not apply any lotions, creams, including makeup, cologne, deodorant, or perfumes after showering on the day of your surgery. Do not use dry shampoo, hair spray, hair gel, or any type of hair products.     No contact lenses, eye make-up, or artificial eyelashes. Remove nail polish, including gel polish, and any artificial, gel, or acrylic nails if possible. Remove all jewelry including rings and body piercing jewelry.     Wear causal clothing that is easy to take on and off. Consider your type of surgery.    Keep any valuables, jewelry, piercings at home. Please bring any specially ordered equipment (sling, braces) if indicated.    Arrange for a responsible person to drive you to and from the hospital on the day of your surgery. Please confirm the visitor policy for the day of your procedure when you receive your phone call with an arrival time.     Call the surgeon's office with any new illnesses, exposures, or additional questions prior to surgery.    Please reference your “My Surgical Experience Booklet” for additional information to prepare for your upcoming surgery.

## 2025-01-08 ENCOUNTER — TELEMEDICINE (OUTPATIENT)
Dept: ANESTHESIOLOGY | Facility: CLINIC | Age: 80
End: 2025-01-08
Payer: MEDICARE

## 2025-01-08 ENCOUNTER — LAB REQUISITION (OUTPATIENT)
Dept: LAB | Facility: HOSPITAL | Age: 80
End: 2025-01-08
Payer: MEDICARE

## 2025-01-08 ENCOUNTER — APPOINTMENT (OUTPATIENT)
Dept: LAB | Facility: CLINIC | Age: 80
End: 2025-01-08
Payer: MEDICARE

## 2025-01-08 DIAGNOSIS — Z01.89 ENCOUNTER FOR GERIATRIC ASSESSMENT: Primary | ICD-10-CM

## 2025-01-08 DIAGNOSIS — I10 HYPERTENSION, UNSPECIFIED TYPE: ICD-10-CM

## 2025-01-08 DIAGNOSIS — E11.8 DIABETIC COMPLICATION (HCC): ICD-10-CM

## 2025-01-08 DIAGNOSIS — E11.8 CONTROLLED TYPE 2 DIABETES MELLITUS WITH COMPLICATION, WITHOUT LONG-TERM CURRENT USE OF INSULIN (HCC): ICD-10-CM

## 2025-01-08 DIAGNOSIS — K57.92 DIVERTICULITIS: ICD-10-CM

## 2025-01-08 DIAGNOSIS — K57.92 DIVERTICULITIS: Primary | ICD-10-CM

## 2025-01-08 DIAGNOSIS — K57.92 DIVERTICULITIS OF INTESTINE, PART UNSPECIFIED, WITHOUT PERFORATION OR ABSCESS WITHOUT BLEEDING: ICD-10-CM

## 2025-01-08 DIAGNOSIS — E03.9 HYPOTHYROIDISM, ADULT: ICD-10-CM

## 2025-01-08 LAB
ABO GROUP BLD: NORMAL
ANION GAP SERPL CALCULATED.3IONS-SCNC: 6 MMOL/L (ref 4–13)
BASOPHILS # BLD AUTO: 0.07 THOUSANDS/ΜL (ref 0–0.1)
BASOPHILS NFR BLD AUTO: 1 % (ref 0–1)
BLD GP AB SCN SERPL QL: NEGATIVE
BUN SERPL-MCNC: 14 MG/DL (ref 5–25)
CALCIUM SERPL-MCNC: 9.9 MG/DL (ref 8.4–10.2)
CHLORIDE SERPL-SCNC: 104 MMOL/L (ref 96–108)
CHOLEST SERPL-MCNC: 169 MG/DL (ref ?–200)
CO2 SERPL-SCNC: 29 MMOL/L (ref 21–32)
CREAT SERPL-MCNC: 0.7 MG/DL (ref 0.6–1.3)
CREAT UR-MCNC: 145.1 MG/DL
EOSINOPHIL # BLD AUTO: 0.22 THOUSAND/ΜL (ref 0–0.61)
EOSINOPHIL NFR BLD AUTO: 3 % (ref 0–6)
ERYTHROCYTE [DISTWIDTH] IN BLOOD BY AUTOMATED COUNT: 13.2 % (ref 11.6–15.1)
EST. AVERAGE GLUCOSE BLD GHB EST-MCNC: 128 MG/DL
GFR SERPL CREATININE-BSD FRML MDRD: 81 ML/MIN/1.73SQ M
GLUCOSE P FAST SERPL-MCNC: 104 MG/DL (ref 65–99)
HBA1C MFR BLD: 6.1 %
HCT VFR BLD AUTO: 44.6 % (ref 34.8–46.1)
HDLC SERPL-MCNC: 51 MG/DL
HGB BLD-MCNC: 14.3 G/DL (ref 11.5–15.4)
IMM GRANULOCYTES # BLD AUTO: 0.04 THOUSAND/UL (ref 0–0.2)
IMM GRANULOCYTES NFR BLD AUTO: 1 % (ref 0–2)
LDLC SERPL CALC-MCNC: 85 MG/DL (ref 0–100)
LYMPHOCYTES # BLD AUTO: 1.65 THOUSANDS/ΜL (ref 0.6–4.47)
LYMPHOCYTES NFR BLD AUTO: 21 % (ref 14–44)
MCH RBC QN AUTO: 27.5 PG (ref 26.8–34.3)
MCHC RBC AUTO-ENTMCNC: 32.1 G/DL (ref 31.4–37.4)
MCV RBC AUTO: 86 FL (ref 82–98)
MICROALBUMIN UR-MCNC: 44.2 MG/L
MICROALBUMIN/CREAT 24H UR: 30 MG/G CREATININE (ref 0–30)
MONOCYTES # BLD AUTO: 0.58 THOUSAND/ΜL (ref 0.17–1.22)
MONOCYTES NFR BLD AUTO: 7 % (ref 4–12)
NEUTROPHILS # BLD AUTO: 5.35 THOUSANDS/ΜL (ref 1.85–7.62)
NEUTS SEG NFR BLD AUTO: 67 % (ref 43–75)
NONHDLC SERPL-MCNC: 118 MG/DL
NRBC BLD AUTO-RTO: 0 /100 WBCS
PLATELET # BLD AUTO: 286 THOUSANDS/UL (ref 149–390)
PMV BLD AUTO: 9.9 FL (ref 8.9–12.7)
POTASSIUM SERPL-SCNC: 4.5 MMOL/L (ref 3.5–5.3)
RBC # BLD AUTO: 5.2 MILLION/UL (ref 3.81–5.12)
RH BLD: POSITIVE
SODIUM SERPL-SCNC: 139 MMOL/L (ref 135–147)
SPECIMEN EXPIRATION DATE: NORMAL
TRIGL SERPL-MCNC: 164 MG/DL (ref ?–150)
TSH SERPL DL<=0.05 MIU/L-ACNC: 1.75 UIU/ML (ref 0.45–4.5)
WBC # BLD AUTO: 7.91 THOUSAND/UL (ref 4.31–10.16)

## 2025-01-08 PROCEDURE — 80048 BASIC METABOLIC PNL TOTAL CA: CPT

## 2025-01-08 PROCEDURE — 83036 HEMOGLOBIN GLYCOSYLATED A1C: CPT

## 2025-01-08 PROCEDURE — 80061 LIPID PANEL: CPT

## 2025-01-08 PROCEDURE — 86901 BLOOD TYPING SEROLOGIC RH(D): CPT | Performed by: COLON & RECTAL SURGERY

## 2025-01-08 PROCEDURE — 99214 OFFICE O/P EST MOD 30 MIN: CPT | Performed by: NURSE PRACTITIONER

## 2025-01-08 PROCEDURE — 36415 COLL VENOUS BLD VENIPUNCTURE: CPT

## 2025-01-08 PROCEDURE — 82043 UR ALBUMIN QUANTITATIVE: CPT

## 2025-01-08 PROCEDURE — 82570 ASSAY OF URINE CREATININE: CPT

## 2025-01-08 PROCEDURE — 86850 RBC ANTIBODY SCREEN: CPT | Performed by: COLON & RECTAL SURGERY

## 2025-01-08 PROCEDURE — 86900 BLOOD TYPING SEROLOGIC ABO: CPT | Performed by: COLON & RECTAL SURGERY

## 2025-01-08 PROCEDURE — 85025 COMPLETE CBC W/AUTO DIFF WBC: CPT

## 2025-01-08 PROCEDURE — 84443 ASSAY THYROID STIM HORMONE: CPT

## 2025-01-08 NOTE — PROGRESS NOTES
[facility]  CONSULT       Patient has the ability to take their vital signs at home- No  Allergies reviewed today   PMH reviewed today   Medications reviewed today     See Geriatric Assessment below...  TUG <15 sec: N/A  Falls (last 6 months): No  Davy Total Score: 22  PHQ- 9 Depression Scale: 0  Nutrition Assessment Score:  13  METS: 8.33  DX SLEEP APNEA;  No  SCORE: 2  Health goals:  -What are your overall health goals? Not current    -What brings you strength? Family    -What activities are important to you? Walking the mall     As always we discussed having your BEST surgery, and BEST recovery.  Surgery goals reviewed today.      Breathing exercises   Patient was encouraged to begin lung exercises today.  This could be accomplished through deep breathing and cough exercises.  Patient was taught how to use an incentive spirometer.  Return demonstration provided.      Eating/nutrition   Encouraged patient to increase oral protein intake prior to surgery.  This can be accomplished by consuming chicken, fish, tuna fish, cottage cheese, cheese, eggs, Greek yogurt, and protein shakes as needed.  I encouraged use of protein shakes such ENLIVE.  I also recommended making your own protein shakes with protein powder.   Sleep/Stress management  Patient was encouraged to rest their body prior to surgery.  Encouraged attempting to get 8 hours of sleep at night.  Avoid stress.  Avoid sick contacts.  Encouraged to find a relaxing hobby such as reading, meditation, listening to music.    Training exercises  Patient was encouraged to remain active as possible.  Today bilateral lower extremity generic exercises were taught for muscle strengthening and balance.  All exercises to be done sitting down.

## 2025-01-08 NOTE — ASSESSMENT & PLAN NOTE
TUG <15 sec: N/A  Falls (last 6 months): No  Davy Total Score: 22  PHQ- 9 Depression Scale: 0  Nutrition Assessment Score:  13  METS: 8.33  DX SLEEP APNEA;  No  SCORE: 2  Health goals:  -What are your overall health goals? Not current     -What brings you strength? Family     -What activities are important to you? Walking the mall

## 2025-01-21 ENCOUNTER — ANESTHESIA EVENT (OUTPATIENT)
Dept: PERIOP | Facility: HOSPITAL | Age: 80
DRG: 331 | End: 2025-01-21
Payer: MEDICARE

## 2025-01-22 ENCOUNTER — ANESTHESIA (OUTPATIENT)
Dept: PERIOP | Facility: HOSPITAL | Age: 80
DRG: 331 | End: 2025-01-22
Payer: MEDICARE

## 2025-01-22 ENCOUNTER — HOSPITAL ENCOUNTER (INPATIENT)
Facility: HOSPITAL | Age: 80
LOS: 4 days | Discharge: HOME HEALTH | DRG: 331 | End: 2025-01-26
Attending: COLON & RECTAL SURGERY | Admitting: COLON & RECTAL SURGERY
Payer: MEDICARE

## 2025-01-22 DIAGNOSIS — K57.92 DIVERTICULITIS: Primary | ICD-10-CM

## 2025-01-22 LAB
GLUCOSE SERPL-MCNC: 114 MG/DL (ref 65–140)
GLUCOSE SERPL-MCNC: 118 MG/DL (ref 65–140)
GLUCOSE SERPL-MCNC: 120 MG/DL (ref 65–140)
GLUCOSE SERPL-MCNC: 137 MG/DL (ref 65–140)
PLATELET # BLD AUTO: 229 THOUSANDS/UL (ref 149–390)
PMV BLD AUTO: 9.8 FL (ref 8.9–12.7)

## 2025-01-22 PROCEDURE — 44204 LAPARO PARTIAL COLECTOMY: CPT | Performed by: COLON & RECTAL SURGERY

## 2025-01-22 PROCEDURE — 0DTN4ZZ RESECTION OF SIGMOID COLON, PERCUTANEOUS ENDOSCOPIC APPROACH: ICD-10-PCS | Performed by: COLON & RECTAL SURGERY

## 2025-01-22 PROCEDURE — 82948 REAGENT STRIP/BLOOD GLUCOSE: CPT

## 2025-01-22 PROCEDURE — NC001 PR NO CHARGE: Performed by: COLON & RECTAL SURGERY

## 2025-01-22 PROCEDURE — S2900 ROBOTIC SURGICAL SYSTEM: HCPCS | Performed by: COLON & RECTAL SURGERY

## 2025-01-22 PROCEDURE — 0DJD8ZZ INSPECTION OF LOWER INTESTINAL TRACT, VIA NATURAL OR ARTIFICIAL OPENING ENDOSCOPIC: ICD-10-PCS | Performed by: COLON & RECTAL SURGERY

## 2025-01-22 PROCEDURE — NC001 PR NO CHARGE: Performed by: PHYSICIAN ASSISTANT

## 2025-01-22 PROCEDURE — 85049 AUTOMATED PLATELET COUNT: CPT

## 2025-01-22 PROCEDURE — 88307 TISSUE EXAM BY PATHOLOGIST: CPT | Performed by: PATHOLOGY

## 2025-01-22 RX ORDER — ALPRAZOLAM 0.25 MG/1
0.25 TABLET ORAL
Status: DISCONTINUED | OUTPATIENT
Start: 2025-01-22 | End: 2025-01-26 | Stop reason: HOSPADM

## 2025-01-22 RX ORDER — ATORVASTATIN CALCIUM 20 MG/1
20 TABLET, FILM COATED ORAL
Status: DISCONTINUED | OUTPATIENT
Start: 2025-01-22 | End: 2025-01-26 | Stop reason: HOSPADM

## 2025-01-22 RX ORDER — SODIUM CHLORIDE, SODIUM GLUCONATE, SODIUM ACETATE, POTASSIUM CHLORIDE, MAGNESIUM CHLORIDE, SODIUM PHOSPHATE, DIBASIC, AND POTASSIUM PHOSPHATE .53; .5; .37; .037; .03; .012; .00082 G/100ML; G/100ML; G/100ML; G/100ML; G/100ML; G/100ML; G/100ML
100 INJECTION, SOLUTION INTRAVENOUS CONTINUOUS
Status: DISCONTINUED | OUTPATIENT
Start: 2025-01-22 | End: 2025-01-23

## 2025-01-22 RX ORDER — INSULIN LISPRO 100 [IU]/ML
1-5 INJECTION, SOLUTION INTRAVENOUS; SUBCUTANEOUS
Status: DISCONTINUED | OUTPATIENT
Start: 2025-01-22 | End: 2025-01-26 | Stop reason: HOSPADM

## 2025-01-22 RX ORDER — FENTANYL CITRATE 50 UG/ML
INJECTION, SOLUTION INTRAMUSCULAR; INTRAVENOUS AS NEEDED
Status: DISCONTINUED | OUTPATIENT
Start: 2025-01-22 | End: 2025-01-22

## 2025-01-22 RX ORDER — PROPOFOL 10 MG/ML
INJECTION, EMULSION INTRAVENOUS AS NEEDED
Status: DISCONTINUED | OUTPATIENT
Start: 2025-01-22 | End: 2025-01-22

## 2025-01-22 RX ORDER — CEFAZOLIN SODIUM 2 G/50ML
2000 SOLUTION INTRAVENOUS ONCE
Status: COMPLETED | OUTPATIENT
Start: 2025-01-22 | End: 2025-01-22

## 2025-01-22 RX ORDER — ONDANSETRON 2 MG/ML
INJECTION INTRAMUSCULAR; INTRAVENOUS AS NEEDED
Status: DISCONTINUED | OUTPATIENT
Start: 2025-01-22 | End: 2025-01-22

## 2025-01-22 RX ORDER — SODIUM CHLORIDE 9 MG/ML
INJECTION, SOLUTION INTRAVENOUS CONTINUOUS PRN
Status: DISCONTINUED | OUTPATIENT
Start: 2025-01-22 | End: 2025-01-22

## 2025-01-22 RX ORDER — ROCURONIUM BROMIDE 10 MG/ML
INJECTION, SOLUTION INTRAVENOUS AS NEEDED
Status: DISCONTINUED | OUTPATIENT
Start: 2025-01-22 | End: 2025-01-22

## 2025-01-22 RX ORDER — HYDROMORPHONE HCL/PF 1 MG/ML
0.5 SYRINGE (ML) INJECTION
Refills: 0 | Status: DISCONTINUED | OUTPATIENT
Start: 2025-01-22 | End: 2025-01-22 | Stop reason: HOSPADM

## 2025-01-22 RX ORDER — SODIUM CHLORIDE, SODIUM LACTATE, POTASSIUM CHLORIDE, CALCIUM CHLORIDE 600; 310; 30; 20 MG/100ML; MG/100ML; MG/100ML; MG/100ML
INJECTION, SOLUTION INTRAVENOUS CONTINUOUS PRN
Status: DISCONTINUED | OUTPATIENT
Start: 2025-01-22 | End: 2025-01-22

## 2025-01-22 RX ORDER — FENTANYL CITRATE/PF 50 MCG/ML
50 SYRINGE (ML) INJECTION
Refills: 0 | Status: DISCONTINUED | OUTPATIENT
Start: 2025-01-22 | End: 2025-01-22 | Stop reason: HOSPADM

## 2025-01-22 RX ORDER — ACETAMINOPHEN 325 MG/1
975 TABLET ORAL EVERY 6 HOURS SCHEDULED
Status: DISCONTINUED | OUTPATIENT
Start: 2025-01-22 | End: 2025-01-26 | Stop reason: HOSPADM

## 2025-01-22 RX ORDER — HEPARIN SODIUM 5000 [USP'U]/ML
5000 INJECTION, SOLUTION INTRAVENOUS; SUBCUTANEOUS ONCE
Status: COMPLETED | OUTPATIENT
Start: 2025-01-22 | End: 2025-01-22

## 2025-01-22 RX ORDER — INDOCYANINE GREEN AND WATER 25 MG
KIT INJECTION AS NEEDED
Status: DISCONTINUED | OUTPATIENT
Start: 2025-01-22 | End: 2025-01-22

## 2025-01-22 RX ORDER — MAGNESIUM HYDROXIDE 1200 MG/15ML
LIQUID ORAL AS NEEDED
Status: DISCONTINUED | OUTPATIENT
Start: 2025-01-22 | End: 2025-01-22 | Stop reason: HOSPADM

## 2025-01-22 RX ORDER — LEVOTHYROXINE SODIUM 25 UG/1
25 TABLET ORAL
Status: DISCONTINUED | OUTPATIENT
Start: 2025-01-23 | End: 2025-01-26 | Stop reason: HOSPADM

## 2025-01-22 RX ORDER — METOPROLOL SUCCINATE 50 MG/1
50 TABLET, EXTENDED RELEASE ORAL
Status: DISCONTINUED | OUTPATIENT
Start: 2025-01-22 | End: 2025-01-26 | Stop reason: HOSPADM

## 2025-01-22 RX ORDER — HEPARIN SODIUM 5000 [USP'U]/ML
5000 INJECTION, SOLUTION INTRAVENOUS; SUBCUTANEOUS EVERY 8 HOURS SCHEDULED
Status: DISCONTINUED | OUTPATIENT
Start: 2025-01-22 | End: 2025-01-26 | Stop reason: HOSPADM

## 2025-01-22 RX ORDER — LIDOCAINE HYDROCHLORIDE 10 MG/ML
INJECTION, SOLUTION EPIDURAL; INFILTRATION; INTRACAUDAL; PERINEURAL AS NEEDED
Status: DISCONTINUED | OUTPATIENT
Start: 2025-01-22 | End: 2025-01-22

## 2025-01-22 RX ORDER — ONDANSETRON 2 MG/ML
4 INJECTION INTRAMUSCULAR; INTRAVENOUS EVERY 6 HOURS PRN
Status: DISCONTINUED | OUTPATIENT
Start: 2025-01-22 | End: 2025-01-26 | Stop reason: HOSPADM

## 2025-01-22 RX ORDER — METRONIDAZOLE 500 MG/100ML
500 INJECTION, SOLUTION INTRAVENOUS ONCE
Status: COMPLETED | OUTPATIENT
Start: 2025-01-22 | End: 2025-01-22

## 2025-01-22 RX ORDER — HYDROMORPHONE HCL/PF 1 MG/ML
SYRINGE (ML) INJECTION AS NEEDED
Status: DISCONTINUED | OUTPATIENT
Start: 2025-01-22 | End: 2025-01-22

## 2025-01-22 RX ORDER — ONDANSETRON 2 MG/ML
4 INJECTION INTRAMUSCULAR; INTRAVENOUS ONCE AS NEEDED
Status: DISCONTINUED | OUTPATIENT
Start: 2025-01-22 | End: 2025-01-22 | Stop reason: HOSPADM

## 2025-01-22 RX ORDER — PHENYLEPHRINE HCL IN 0.9% NACL 1 MG/10 ML
SYRINGE (ML) INTRAVENOUS AS NEEDED
Status: DISCONTINUED | OUTPATIENT
Start: 2025-01-22 | End: 2025-01-22

## 2025-01-22 RX ADMIN — METRONIDAZOLE: 500 SOLUTION INTRAVENOUS at 11:13

## 2025-01-22 RX ADMIN — Medication: at 14:52

## 2025-01-22 RX ADMIN — Medication 50 MCG: at 11:01

## 2025-01-22 RX ADMIN — ROCURONIUM BROMIDE 50 MG: 10 INJECTION, SOLUTION INTRAVENOUS at 11:01

## 2025-01-22 RX ADMIN — ONDANSETRON 4 MG: 2 INJECTION INTRAMUSCULAR; INTRAVENOUS at 13:27

## 2025-01-22 RX ADMIN — HEPARIN SODIUM 5000 UNITS: 5000 INJECTION INTRAVENOUS; SUBCUTANEOUS at 10:24

## 2025-01-22 RX ADMIN — HEPARIN SODIUM 5000 UNITS: 5000 INJECTION INTRAVENOUS; SUBCUTANEOUS at 18:23

## 2025-01-22 RX ADMIN — CEFAZOLIN SODIUM 2000 MG: 2 SOLUTION INTRAVENOUS at 11:14

## 2025-01-22 RX ADMIN — HEPARIN SODIUM 5000 UNITS: 5000 INJECTION INTRAVENOUS; SUBCUTANEOUS at 21:39

## 2025-01-22 RX ADMIN — SODIUM CHLORIDE, SODIUM LACTATE, POTASSIUM CHLORIDE, AND CALCIUM CHLORIDE: .6; .31; .03; .02 INJECTION, SOLUTION INTRAVENOUS at 10:58

## 2025-01-22 RX ADMIN — ALPRAZOLAM 0.25 MG: 0.25 TABLET ORAL at 23:47

## 2025-01-22 RX ADMIN — FENTANYL CITRATE 50 MCG: 50 INJECTION INTRAMUSCULAR; INTRAVENOUS at 11:01

## 2025-01-22 RX ADMIN — INDOCYANINE GREEN AND WATER 2.5 MG: KIT at 12:45

## 2025-01-22 RX ADMIN — HYDROMORPHONE HYDROCHLORIDE 0.5 MG: 1 INJECTION, SOLUTION INTRAMUSCULAR; INTRAVENOUS; SUBCUTANEOUS at 12:22

## 2025-01-22 RX ADMIN — SODIUM CHLORIDE: 0.9 INJECTION, SOLUTION INTRAVENOUS at 11:07

## 2025-01-22 RX ADMIN — FENTANYL CITRATE 50 MCG: 50 INJECTION INTRAMUSCULAR; INTRAVENOUS at 11:29

## 2025-01-22 RX ADMIN — SODIUM CHLORIDE, SODIUM GLUCONATE, SODIUM ACETATE, POTASSIUM CHLORIDE, MAGNESIUM CHLORIDE, SODIUM PHOSPHATE, DIBASIC, AND POTASSIUM PHOSPHATE 100 ML/HR: .53; .5; .37; .037; .03; .012; .00082 INJECTION, SOLUTION INTRAVENOUS at 14:52

## 2025-01-22 RX ADMIN — SODIUM CHLORIDE, SODIUM GLUCONATE, SODIUM ACETATE, POTASSIUM CHLORIDE, MAGNESIUM CHLORIDE, SODIUM PHOSPHATE, DIBASIC, AND POTASSIUM PHOSPHATE 100 ML/HR: .53; .5; .37; .037; .03; .012; .00082 INJECTION, SOLUTION INTRAVENOUS at 23:55

## 2025-01-22 RX ADMIN — ATORVASTATIN CALCIUM 20 MG: 20 TABLET, FILM COATED ORAL at 21:39

## 2025-01-22 RX ADMIN — ROCURONIUM BROMIDE 20 MG: 10 INJECTION, SOLUTION INTRAVENOUS at 12:57

## 2025-01-22 RX ADMIN — METOPROLOL SUCCINATE 50 MG: 50 TABLET, EXTENDED RELEASE ORAL at 21:39

## 2025-01-22 RX ADMIN — ACETAMINOPHEN 975 MG: 325 TABLET, FILM COATED ORAL at 23:47

## 2025-01-22 RX ADMIN — PROPOFOL 90 MG: 10 INJECTION, EMULSION INTRAVENOUS at 11:01

## 2025-01-22 RX ADMIN — LIDOCAINE HYDROCHLORIDE 4 ML: 10 INJECTION, SOLUTION EPIDURAL; INFILTRATION; INTRACAUDAL; PERINEURAL at 11:01

## 2025-01-22 RX ADMIN — Medication 100 MCG: at 11:15

## 2025-01-22 RX ADMIN — PHENYLEPHRINE HYDROCHLORIDE 30 MCG/MIN: 10 INJECTION INTRAVENOUS at 11:15

## 2025-01-22 RX ADMIN — SUGAMMADEX 200 MG: 100 INJECTION, SOLUTION INTRAVENOUS at 13:33

## 2025-01-22 NOTE — H&P
History and Physical   Colon and Rectal Surgery   Ama Arteaga 79 y.o. female MRN: [id number]  Unit/Bed#: OR [place] Encounter: [id number]  01/22/25   @NOW    No chief complaint on file.        History of Present Illness   HPI:  Ama Arteaga is a 79 y.o. female who presents for surgical treatment of recurrent diverticulitis.      Historical Information   Past Medical History:   Diagnosis Date    Adrenal abnormality (HCC)     See endocrinologist for care-patient unsure of condition    Change in bowel habit     diarrhea-started end of November 2022    Chronic kidney disease     cyst on right kidney    Colon polyp     Diabetes (HCC)     Disease of thyroid gland     Diverticulitis     Dry heaves     hx of    Dysphagia     GERD (gastroesophageal reflux disease)     Hearing loss     wears B/L hearing aides    History of transfusion 1978    Tubal Pregnancy, no reactions    Hyperlipidemia     Hypertension     Irregular heart beat     last saw Dr. Marshall 2 months ago: denies chest pain, SOB or palpitations per patient  2/22/23    Low back pain     Osteoporosis scoliosis    Skipped heart beats     checked by cardiologist (Dr. Marshall)-was told is no problem October 2021; also had ECHO and stress test    Tubal pregnancy      Past Surgical History:   Procedure Laterality Date    CARPAL TUNNEL RELEASE Bilateral     COLONOSCOPY      ECTOPIC PREGNANCY SURGERY      EGD      EYE SURGERY      Defuction of Right Eye Sac    NEUROPLASTY / TRANSPOSITION MEDIAN NERVE AT CARPAL TUNNEL BILATERAL      OSTEOTOMY TOES Left     2 and 3rd phalanges    OTHER SURGICAL HISTORY      Calcification removed from Right Thumb    [place] ARTHRODESIS COMBINED TQ 1NTRSPC LUMBAR N/A 11/01/2021    Procedure: Minimally invasive transverse lumbar interbody fusion L4-5 from left-sided approach with decompressive laminectomy;  Surgeon: Stanislav Ervin MD;  Location: [facility];  Service: Neurosurgery    [place] HYSTEROSCOPY BX ENDOMETRIUM&/POLYPC W/WO D&C N/A 11/25/2024     Procedure: EXAM UNDER ANESTHESIA; DILATATION AND CURETTAGE (D&C) WITH HYSTEROSCOPY. REMOVAL OF CERVICAL MASS.;  Surgeon: Simin Fritz MD;  Location:  MAIN OR;  Service: Gynecology       Meds/Allergies       Medications Prior to Admission:     acetaminophen (TYLENOL) 325 mg tablet    ALPRAZolam (XANAX) 0.25 mg tablet    atorvastatin (LIPITOR) 20 mg tablet    B Complex Vitamins (VITAMIN B COMPLEX PO)    Coenzyme Q10 (CO Q 10 PO)    levothyroxine 25 mcg tablet    metFORMIN (GLUCOPHAGE-XR) 500 mg 24 hr tablet    metoprolol succinate (TOPROL-XL) 50 mg 24 hr tablet    polyethylene glycol (MIRALAX) 17 g packet    VITAMIN D PO      Current Facility-Administered Medications:     metroNIDAZOLE (FLAGYL) IVPB (premix) 500 mg 100 mL, 500 mg, Intravenous, Once **AND** ceFAZolin (ANCEF) IVPB (premix in dextrose) 2,000 mg 50 mL, 2,000 mg, Intravenous, Once, Stanislav Zarate MD    heparin (porcine) subcutaneous injection 5,000 Units, 5,000 Units, Subcutaneous, Once, Stanislav Zarate MD    No Known Allergies      Social History   Social History     Substance and Sexual Activity   Alcohol Use Not Currently     Social History     Substance and Sexual Activity   Drug Use Not Currently     Social History     Tobacco Use   Smoking Status Former    Current packs/day: 0.00    Types: Cigarettes    Quit date:     Years since quittin.0   Smokeless Tobacco Never   Tobacco Comments    quit 40 years ago         Family History:   Family History   Problem Relation Age of Onset    Leukemia Mother     Stomach cancer Father     Aneurysm Brother     Lung cancer Brother     Kidney cancer Brother     COPD Brother     Aneurysm Brother     Anuerysm Brother     Hernia Brother     Lung cancer Brother          Objective     Current Vitals:   Blood Pressure: 124/78 (25 1001)  Pulse: 100 (25 1000)  Temperature: 98.2 °F (36.8 °C) (25 1000)  Temp Source: Temporal (25 1000)  Respirations: 18 (25 1000)  Height:  "5' 2\" (157.5 cm) (01/22/25 1005)  Weight - Scale: 64.4 kg (142 lb) (01/22/25 1005)  SpO2: 96 % (01/22/25 1000)  No intake or output data in the 24 hours ending 01/22/25 1020    Physical Exam:  General:  Resting comfortably in bed   Eyes:Sclera anicteric  ENT: Trachea midline  Pulm:  Symmetric chest raise.  No respiratory Distress  CV:  Regular on monitor  Abdomen:  Soft NT ND  Extremities:  No clubbing/ cyanosis/ edema    Lab Results: I have personally reviewed pertinent lab results.    Imaging: Results Review Statement: No pertinent imaging studies reviewed.      ASSESSMENT:  Ama Arteaga is a 79 y.o. female who presents for surgical treatment of recurrent diverticulitis.  Plan is for robotic assisted minimally invasive sigmoid colectomy.  OR for Colectomy. Risks of surgery, including but not limited to bleeding, infection, anastomotic leak, need for colostomy or enterostomy, injury or dysfunction of the bowel or urinary tract, injury or need for removal of other organs, sexual dysfunction, impotence, bowel obstruction in the future, hernia formation, bowel dysfunction, fecal incontinence, thromboembolic complications (deep vein clots or clots to the lungs), heart failure, heart attack, even death were reviewed. Questions were fully answered.    "

## 2025-01-22 NOTE — RESPIRATORY THERAPY NOTE
RT Protocol Note  Ama Arteaga 79 y.o. female MRN: [id number]  Unit/Bed#: [facility] 825-01 Encounter: [id number]    Assessment    Active Problems:    Diverticulitis      Home Pulmonary Medications: None         Past Medical History:   Diagnosis Date    Adrenal abnormality (HCC)     See endocrinologist for care-patient unsure of condition    Change in bowel habit     diarrhea-started end of 2022    Chronic kidney disease     cyst on right kidney    Colon polyp     Diabetes (HCC)     Disease of thyroid gland     Diverticulitis     Dry heaves     hx of    Dysphagia     GERD (gastroesophageal reflux disease)     Hearing loss     wears B/L hearing aides    History of transfusion     Tubal Pregnancy, no reactions    Hyperlipidemia     Hypertension     Irregular heart beat     last saw Dr. Marshall 2 months ago: denies chest pain, SOB or palpitations per patient  23    Low back pain     Osteoporosis scoliosis    Skipped heart beats     checked by cardiologist (Dr. Marshall)-was told is no problem 2021; also had ECHO and stress test    Tubal pregnancy      Social History     Socioeconomic History    Marital status: /Civil Union     Spouse name: None    Number of children: None    Years of education: None    Highest education level: None   Occupational History    None   Tobacco Use    Smoking status: Former     Current packs/day: 0.00     Types: Cigarettes     Quit date:      Years since quittin.0    Smokeless tobacco: Never    Tobacco comments:     quit 40 years ago   Vaping Use    Vaping status: Never Used   Substance and Sexual Activity    Alcohol use: Not Currently    Drug use: Not Currently    Sexual activity: Not Currently   Other Topics Concern    None   Social History Narrative    None     Social Drivers of Health     Financial Resource Strain: Not on file   Food Insecurity: No Food Insecurity (2025)    Nursing - Inadequate Food Risk Classification     Worried About Running Out of  "Food in the Last Year: Never true     Ran Out of Food in the Last Year: Never true     Ran Out of Food in the Last Year: Never true   Transportation Needs: No Transportation Needs (2025)    Nursing - Transportation Risk Classification     Lack of Transportation: Not on file     Lack of Transportation: No   Physical Activity: Not on file   Stress: Not on file   Social Connections: Not on file   Intimate Partner Violence: Unknown (2025)    Nursing IPS     Feels Physically and Emotionally Safe: Not on file     Physically Hurt by Someone: Not on file     Humiliated or Emotionally Abused by Someone: Not on file     Physically Hurt by Someone: No     Hurt or Threatened by Someone: No   Housing Stability: Unknown (2025)    Nursing: Inadequate Housing Risk Classification     Has Housing: Not on file     Worried About Losing Housing: Not on file     Unable to Get Utilities: Not on file     Unable to Pay for Housing in the Last Year: No     Has Housin       Subjective         Objective    Physical Exam:   Assessment Type: Assess only  General Appearance: Alert, Awake  Bilateral Breath Sounds: Clear    Vitals:  Blood pressure 118/62, pulse 84, temperature 100.2 °F (37.9 °C), resp. rate 18, height 5' 2\" (1.575 m), weight 64.4 kg (142 lb), SpO2 93%.          Imaging and other studies: Results Review Statement: I personally reviewed the following image studies/reports in PACS and discussed pertinent findings with Radiology: chest xray. My interpretation of the radiology images/reports is: na.          Plan    Respiratory Plan: No distress/Pulmonary history  Airway Clearance Plan: Incentive Spirometer     Resp Comments: (P) Pt admitted for recurrent diverticulitis. Pt post op colon surgery. Pt assessed per resp and airway clearance protocol. Pt instructed on use of IS, done well. No other rx needed per protocol. Will d/c protocols. Pt will use IS on own Q1wa.   "

## 2025-01-22 NOTE — ANESTHESIA POSTPROCEDURE EVALUATION
Post-Op Assessment Note    CV Status:  Stable  Pain Score: 0    Pain management: adequate       Mental Status:  Awake   Hydration Status:  Stable   PONV Controlled:  None   Airway Patency:  Patent     Post Op Vitals Reviewed: Yes    No anethesia notable event occurred.    Staff: CRNA           Last Filed PACU Vitals:  Vitals Value Taken Time   Temp 97.4    Pulse 83 01/22/25 1350   /68 01/22/25 1350   Resp 12 01/22/25 1350   SpO2 98 % 01/22/25 1350   Vitals shown include unfiled device data.

## 2025-01-22 NOTE — OP NOTE
OPERATIVE REPORT  PATIENT NAME: Ama Arteaga    :  1945  MRN: [id number]  Pt Location: [facility] OR ROOM 14    SURGERY DATE: 2025    Surgeons and Role:     * Stanislav Zarate MD - Primary     * Wanda Romayne Stengel Hohenshilt, PA-C - Assisting     * Eleuterio Velasco MD - Assisting    Preop Diagnosis:  Diverticulitis [K57.92]    Post-Op Diagnosis Codes:     * Diverticulitis [K57.92]    Procedure(s):  SIGMOID RESECTION COLON LAPAROSCOPIC W ROBOTICS    Specimen(s):  ID Type Source Tests Collected by Time Destination   1 :  Tissue Large Intestine, Sigmoid Colon TISSUE EXAM Stanislav Zarate MD 2025 1257        Estimated Blood Loss:   50 mL    Drains:  NG/OG/Enteral Tube Orogastric 18 Fr Right mouth (Active)   Number of days: 0       Urethral Catheter Latex;Double-lumen 16 Fr. (Active)   Number of days: 0       [REMOVED] NG/OG/Enteral Tube (Removed)   Number of days: 0       Anesthesia Type:   General    Operative Indications:  Diverticulitis [K57.92]      Operative Findings:  Significant inflammatory changes of the descending colon sigmoid junction and sigmoid colon.  Left-sided adnexal adhesions to the left portion of the sigmoid colon.  Otherwise normal pelvic exam.    Complications:   None    Procedure and Technique:  After preoperative identification in the preoperative holding area the patient was taken the operating room placed in the supine position.  After satisfactory induction of general endotracheal anesthesia appropriate placement of anesthesia monitors, patient was placed in a modified low lithotomy position.  Patient was secured to the operating room table.  Patient was prepped and draped in usual sterile fashion.  Antibiotics were given prior to incision.  Time-out was undertaken procedure begun.    Left-sided paramedian incision was made.  8 mm trocar was placed via Optiview technique.  And was insufflated to 15 mmHg.  Abdomen is inspected for signs of injury upon entry.  None  were seen.  Separate trocars were placed in the right lower quadrant.  This was upsized to a 12 mm trocar.  Right upper quadrant, left upper quadrant were all 8 mm trocars.  5 mm air seal was placed in the right upper quadrant.  Abdomen is then inspected.  The sigmoid colon is noted to be quite thickened and there are multiple adhesions of the sigmoid colon descending junction into the pelvis with a very redundant sigmoid colon.  This was consistent with the patient's known history of diverticulitis.  No other significant findings on examination.  As such we docked the robot and began procedure.    Using sharp dissection, lateral adhesions were taken down.  Adhesions anteriorly off the peritoneum were taken down until the sigmoid colon was left with just its mesenteric attachments.  Retrorectal space was entered in a medial to lateral fashion.  Dissection was taken down below the peritoneal reflection and the total mesorectal excision plane.  Left side was then inspected.  Ureter was swept into the retroperitoneum.  Laterally we noted that the left adnexa was adherent to the colon.  This was dissected free sharply.  Approximately the inferior mesenteric artery was encircled for eventual ligation.  Lateral dissections were taken up through the level of the splenic flexure.  At this point the colon was noted to be soft and supple and free of the diverticular disease.  Medial dissection was then completed.  We now prepared for transection.    Vessel serial used to clear the mesorectum posteriorly.  The rectum at this level was soft and supple and free of signs of diverticular disease.  Approximately the inferior mesenteric artery was sealed and divided using the vessel sealer.  Dissection was taken cephalad medially along the duodenum to allow the medial peritoneal attachments to be completely freed up.  Posterior attachments were then swept and the entire left colon was mobilized up to the splenic flexure.  This  allowed soft supple descending colon to come down to the pelvis for eventual dissection.  Mesentery was then divided using the vessel sealer out to the colon.  ICG was given and good fluorescence of the proposed descending colon anastomotic site was noted on ICG fluorescence.  As such we prepared for anastomosis.  Robot was undocked.  We then rescrubbed into the case.    Pfannenstiel was created.  HandPort was placed: Was brought up through this the transected end.  At the area that had been cleared intra-abdominal he, pursestring was placed and a 2-0 Prolene was used to create a pursestring around the 28 mm stapler anvil.  This easily came into the pelvis under no tension.  This area.  Soft supple with good blood flow and was free of significant diverticular disease.  Peritoneal  then brought the stapler up through the anus to the transected rectal stump.  Tam was deployed just posterior to the rectal stump.  Tam was affixed to the anvil.  Stapler was then closed.  No extraneous structures or twist in the mesentery were noticed in our anastomosis.  Stapler was completely closed and fired.  Staple was removed from the anus.  2 full-thickness anastomotic rings were noted.  Flexible sigmoidoscopy is undertaken.  No leak was noted on underwater testing.  Anastomosis was free of significant tension with pink mucosa both proximally and distally to anastomosis.  As such mid descending colon was anastomosed to upper rectum.  Abdomen inspected for hemostasis.  This was noted to be excellent.  The 12 mm port was closed at the fascial level using the fascial closure device and a #1 Vicryl.      All ports and HandPort removed.  Gown and gloves were changed and clean instruments used for closure.  Peritoneum was closed using a #1 running Vicryl.  #1 PDS was used to close fascia.  All ports were closed with 4-0 Monocryl with Exofin as skin glue.  Patient was awakened from anesthesia and transferred to recovery room  in stable condition.     I was present for the entire procedure.    Patient Disposition:  PACU     This procedure was not performed to treat colon cancer through resection           SIGNATURE: Stanislav Zarate MD  DATE: January 22, 2025  TIME: 1:32 PM

## 2025-01-22 NOTE — ANESTHESIA PREPROCEDURE EVALUATION
Procedure:  SIGMOID RESECTION COLON LAPAROSCOPIC W ROBOTICS (Abdomen)    Relevant Problems   CARDIO   (+) Hypertension   (+) Mixed hyperlipidemia      ENDO   (+) Controlled type 2 diabetes mellitus with complication, without long-term current use of insulin (HCC)   (+) Hypothyroid      GI/HEPATIC   (+) Gastroesophageal reflux disease without esophagitis      MUSCULOSKELETAL   (+) Lower back pain   (+) Neurogenic claudication due to lumbar spinal stenosis   (+) Primary localized osteoarthritis of right hip   (+) Primary localized osteoarthritis of right knee   (+) Scoliosis deformity of spine      NEURO/PSYCH   (+) Chronic right shoulder pain   (+) Neurogenic claudication due to lumbar spinal stenosis        Physical Exam    Airway    Mallampati score: II  TM Distance: >3 FB  Neck ROM: full     Dental   No notable dental hx upper dentures    Cardiovascular  Rhythm: regular, Rate: normal    Pulmonary   Breath sounds clear to auscultation    Other Findings  post-pubertal.      Anesthesia Plan  ASA Score- 3     Anesthesia Type- general with ASA Monitors.         Additional Monitors:     Airway Plan: ETT.           Plan Factors-Exercise tolerance (METS): >4 METS.    Chart reviewed. EKG reviewed.  Existing labs reviewed. Patient summary reviewed.    Patient is not a current smoker.      Obstructive sleep apnea risk education given perioperatively.        Induction- intravenous.    Postoperative Plan- Plan for postoperative opioid use.     Perioperative Resuscitation Plan - Level 1 - Full Code.       Informed Consent- Anesthetic plan and risks discussed with patient.  I personally reviewed this patient with the CRNA. Discussed and agreed on the Anesthesia Plan with the CRNA..      NPO Status:  No vitals data found for the desired time range.

## 2025-01-23 LAB
ANION GAP SERPL CALCULATED.3IONS-SCNC: 5 MMOL/L (ref 4–13)
BASOPHILS # BLD AUTO: 0.03 THOUSANDS/ΜL (ref 0–0.1)
BASOPHILS NFR BLD AUTO: 0 % (ref 0–1)
BUN SERPL-MCNC: 7 MG/DL (ref 5–25)
CALCIUM SERPL-MCNC: 8.7 MG/DL (ref 8.4–10.2)
CHLORIDE SERPL-SCNC: 103 MMOL/L (ref 96–108)
CO2 SERPL-SCNC: 29 MMOL/L (ref 21–32)
CREAT SERPL-MCNC: 0.62 MG/DL (ref 0.6–1.3)
EOSINOPHIL # BLD AUTO: 0.05 THOUSAND/ΜL (ref 0–0.61)
EOSINOPHIL NFR BLD AUTO: 1 % (ref 0–6)
ERYTHROCYTE [DISTWIDTH] IN BLOOD BY AUTOMATED COUNT: 12.9 % (ref 11.6–15.1)
GFR SERPL CREATININE-BSD FRML MDRD: 84 ML/MIN/1.73SQ M
GLUCOSE SERPL-MCNC: 108 MG/DL (ref 65–140)
GLUCOSE SERPL-MCNC: 119 MG/DL (ref 65–140)
GLUCOSE SERPL-MCNC: 140 MG/DL (ref 65–140)
GLUCOSE SERPL-MCNC: 189 MG/DL (ref 65–140)
HCT VFR BLD AUTO: 38.3 % (ref 34.8–46.1)
HGB BLD-MCNC: 12.2 G/DL (ref 11.5–15.4)
IMM GRANULOCYTES # BLD AUTO: 0.06 THOUSAND/UL (ref 0–0.2)
IMM GRANULOCYTES NFR BLD AUTO: 1 % (ref 0–2)
LYMPHOCYTES # BLD AUTO: 1.1 THOUSANDS/ΜL (ref 0.6–4.47)
LYMPHOCYTES NFR BLD AUTO: 11 % (ref 14–44)
MAGNESIUM SERPL-MCNC: 1.9 MG/DL (ref 1.9–2.7)
MCH RBC QN AUTO: 27.5 PG (ref 26.8–34.3)
MCHC RBC AUTO-ENTMCNC: 31.9 G/DL (ref 31.4–37.4)
MCV RBC AUTO: 87 FL (ref 82–98)
MONOCYTES # BLD AUTO: 0.9 THOUSAND/ΜL (ref 0.17–1.22)
MONOCYTES NFR BLD AUTO: 9 % (ref 4–12)
NEUTROPHILS # BLD AUTO: 7.49 THOUSANDS/ΜL (ref 1.85–7.62)
NEUTS SEG NFR BLD AUTO: 78 % (ref 43–75)
NRBC BLD AUTO-RTO: 0 /100 WBCS
PHOSPHATE SERPL-MCNC: 2.6 MG/DL (ref 2.3–4.1)
PLATELET # BLD AUTO: 204 THOUSANDS/UL (ref 149–390)
PMV BLD AUTO: 10.1 FL (ref 8.9–12.7)
POTASSIUM SERPL-SCNC: 3.8 MMOL/L (ref 3.5–5.3)
RBC # BLD AUTO: 4.43 MILLION/UL (ref 3.81–5.12)
SODIUM SERPL-SCNC: 137 MMOL/L (ref 135–147)
WBC # BLD AUTO: 9.63 THOUSAND/UL (ref 4.31–10.16)

## 2025-01-23 PROCEDURE — 84100 ASSAY OF PHOSPHORUS: CPT

## 2025-01-23 PROCEDURE — 83735 ASSAY OF MAGNESIUM: CPT

## 2025-01-23 PROCEDURE — 85025 COMPLETE CBC W/AUTO DIFF WBC: CPT

## 2025-01-23 PROCEDURE — 82948 REAGENT STRIP/BLOOD GLUCOSE: CPT

## 2025-01-23 PROCEDURE — 97163 PT EVAL HIGH COMPLEX 45 MIN: CPT

## 2025-01-23 PROCEDURE — 80048 BASIC METABOLIC PNL TOTAL CA: CPT

## 2025-01-23 PROCEDURE — 99024 POSTOP FOLLOW-UP VISIT: CPT | Performed by: COLON & RECTAL SURGERY

## 2025-01-23 RX ORDER — HYDROMORPHONE HCL IN WATER/PF 6 MG/30 ML
0.2 PATIENT CONTROLLED ANALGESIA SYRINGE INTRAVENOUS EVERY 4 HOURS PRN
Status: DISCONTINUED | OUTPATIENT
Start: 2025-01-23 | End: 2025-01-26 | Stop reason: HOSPADM

## 2025-01-23 RX ORDER — DEXTROSE MONOHYDRATE, SODIUM CHLORIDE, AND POTASSIUM CHLORIDE 50; 1.49; 4.5 G/1000ML; G/1000ML; G/1000ML
84 INJECTION, SOLUTION INTRAVENOUS CONTINUOUS
Status: DISCONTINUED | OUTPATIENT
Start: 2025-01-23 | End: 2025-01-24

## 2025-01-23 RX ORDER — POTASSIUM CHLORIDE 1500 MG/1
20 TABLET, EXTENDED RELEASE ORAL ONCE
Status: COMPLETED | OUTPATIENT
Start: 2025-01-23 | End: 2025-01-23

## 2025-01-23 RX ORDER — METHOCARBAMOL 500 MG/1
500 TABLET, FILM COATED ORAL EVERY 6 HOURS SCHEDULED
Status: DISCONTINUED | OUTPATIENT
Start: 2025-01-23 | End: 2025-01-26 | Stop reason: HOSPADM

## 2025-01-23 RX ORDER — MAGNESIUM SULFATE 1 G/100ML
1 INJECTION INTRAVENOUS ONCE
Status: COMPLETED | OUTPATIENT
Start: 2025-01-23 | End: 2025-01-23

## 2025-01-23 RX ADMIN — LEVOTHYROXINE SODIUM 25 MCG: 25 TABLET ORAL at 06:29

## 2025-01-23 RX ADMIN — DEXTROSE, SODIUM CHLORIDE, AND POTASSIUM CHLORIDE 84 ML/HR: 5; .45; .15 INJECTION INTRAVENOUS at 07:48

## 2025-01-23 RX ADMIN — INSULIN LISPRO 1 UNITS: 100 INJECTION, SOLUTION INTRAVENOUS; SUBCUTANEOUS at 12:22

## 2025-01-23 RX ADMIN — HEPARIN SODIUM 5000 UNITS: 5000 INJECTION INTRAVENOUS; SUBCUTANEOUS at 21:40

## 2025-01-23 RX ADMIN — Medication: at 07:50

## 2025-01-23 RX ADMIN — HEPARIN SODIUM 5000 UNITS: 5000 INJECTION INTRAVENOUS; SUBCUTANEOUS at 14:50

## 2025-01-23 RX ADMIN — METHOCARBAMOL 500 MG: 500 TABLET ORAL at 10:21

## 2025-01-23 RX ADMIN — ACETAMINOPHEN 975 MG: 325 TABLET, FILM COATED ORAL at 17:15

## 2025-01-23 RX ADMIN — MAGNESIUM SULFATE HEPTAHYDRATE 1 G: 1 INJECTION, SOLUTION INTRAVENOUS at 10:22

## 2025-01-23 RX ADMIN — METHOCARBAMOL 500 MG: 500 TABLET ORAL at 17:15

## 2025-01-23 RX ADMIN — ACETAMINOPHEN 975 MG: 325 TABLET, FILM COATED ORAL at 06:29

## 2025-01-23 RX ADMIN — ALPRAZOLAM 0.25 MG: 0.25 TABLET ORAL at 21:40

## 2025-01-23 RX ADMIN — POTASSIUM CHLORIDE 20 MEQ: 1500 TABLET, EXTENDED RELEASE ORAL at 10:22

## 2025-01-23 RX ADMIN — ATORVASTATIN CALCIUM 20 MG: 20 TABLET, FILM COATED ORAL at 21:40

## 2025-01-23 RX ADMIN — DEXTROSE, SODIUM CHLORIDE, AND POTASSIUM CHLORIDE 84 ML/HR: 5; .45; .15 INJECTION INTRAVENOUS at 19:07

## 2025-01-23 RX ADMIN — HEPARIN SODIUM 5000 UNITS: 5000 INJECTION INTRAVENOUS; SUBCUTANEOUS at 06:29

## 2025-01-23 RX ADMIN — ACETAMINOPHEN 975 MG: 325 TABLET, FILM COATED ORAL at 12:22

## 2025-01-23 RX ADMIN — METHOCARBAMOL 500 MG: 500 TABLET ORAL at 21:40

## 2025-01-23 RX ADMIN — METOPROLOL SUCCINATE 50 MG: 50 TABLET, EXTENDED RELEASE ORAL at 21:00

## 2025-01-23 NOTE — ANESTHESIA POSTPROCEDURE EVALUATION
Post-Op Assessment Note            No anethesia notable event occurred.            Last Filed PACU Vitals:  Vitals Value Taken Time   Temp 97.4 °F (36.3 °C) 01/22/25 1350   Pulse 80 01/22/25 1515   /63 01/22/25 1515   Resp 11 01/22/25 1515   SpO2 99 % 01/22/25 1515       Modified Sumeet:     Vitals Value Taken Time   Activity 2 01/22/25 1515   Respiration 2 01/22/25 1515   Circulation 2 01/22/25 1515   Consciousness 1 01/22/25 1515   Oxygen Saturation 1 01/22/25 1515     Modified Sumeet Score: 8

## 2025-01-23 NOTE — PROGRESS NOTES
Progress Note - Colorectal   Name: Ama Arteaga 79 y.o. female I MRN: [id number]  Unit/Bed#: [facility] 825-01 I Date of Admission: 1/22/2025   Date of Service: 1/23/2025 I Hospital Day: 1     Assessment & Plan  Diverticulitis  79-year-old female with diverticulitis status post robotic sigmoidectomy 1/22, recovering well    Vital signs stable within normal limits on room air     A.m. labs pending     Plan  -Advance to clears toast crackers  -Monitor for return of bowel function  -Maintenance IV fluids  -Discontinue Diallo catheter  -Discontinue PCA  -Multimodal pain regimen  -Encourage ambulation/out of bed, 3 times daily  -Encourage incentive spirometer use, 10 times per hour  -DVT prophylaxis      Subjective   Patient seen and examined at bedside.  Patient appeared very anxious today.  Patient is concerned with regards to her urination.  Patient denies nausea vomiting.  Patient is tolerating clear liquid diet.  Patient is achieving 1500 on incentive spirometer    Objective :  Temp:  [97.4 °F (36.3 °C)-100.2 °F (37.9 °C)] 98.4 °F (36.9 °C)  HR:  [] 89  BP: (104-166)/() 122/67  Resp:  [11-21] 16  SpO2:  [92 %-100 %] 96 %  O2 Device: None (Room air)  Nasal Cannula O2 Flow Rate (L/min):  [2 L/min] 2 L/min    I/O         01/21 0701  01/22 0700 01/22 0701  01/23 0700    P.O.  120    I.V. (mL/kg)  2315.6 (36)    IV Piggyback  100    Total Intake(mL/kg)  2535.6 (39.4)    Urine (mL/kg/hr)  750    Stool  0    Blood  50    Total Output  800    Net  +1735.6          Unmeasured Stool Occurrence  0 x          Lines/Drains/Airways       Active Status       Name Placement date Placement time Site Days    Urethral Catheter Latex;Double-lumen 16 Fr. 01/22/25  1106  Latex;Double-lumen  less than 1                  Physical Exam  Vitals and nursing note reviewed.   Constitutional:       Appearance: Normal appearance.   HENT:      Head: Normocephalic and atraumatic.      Nose: Nose normal.   Eyes:      General: No scleral  icterus.     Conjunctiva/sclera: Conjunctivae normal.   Cardiovascular:      Rate and Rhythm: Normal rate.   Pulmonary:      Effort: Pulmonary effort is normal.   Abdominal:      General: Abdomen is flat. There is no distension.      Palpations: Abdomen is soft.      Tenderness: There is abdominal tenderness.   Skin:     General: Skin is warm and dry.      Capillary Refill: Capillary refill takes less than 2 seconds.   Neurological:      Mental Status: She is alert and oriented to person, place, and time.   Psychiatric:         Mood and Affect: Mood normal.         Lab Results: I have reviewed the following results:  Recent Labs     01/22/25  1645          Imaging Results Review: No pertinent imaging studies reviewed.  Other Study Results Review: No additional pertinent studies reviewed.    VTE Pharmacologic Prophylaxis: VTE covered by:  heparin (porcine), Subcutaneous, 5,000 Units at 01/22/25 9992      VTE Mechanical Prophylaxis: sequential compression device

## 2025-01-23 NOTE — PHYSICAL THERAPY NOTE
Physical Therapy Evaluation     Patient's Name: Ama Arteaga    Admitting Diagnosis  Diverticulitis [K57.92]    Problem List  Patient Active Problem List   Diagnosis    Chronic right shoulder pain    Lower back pain    Spondylolisthesis of lumbar region    Spinal stenosis at L4-L5 level    Neurogenic claudication due to lumbar spinal stenosis    Scoliosis deformity of spine    Hypertension    History of back surgery    Drug-induced constipation    Encounter for postoperative care related to surgical joint fusion    Nontraumatic tear of right rotator cuff    Hemorrhagic cystitis    Controlled type 2 diabetes mellitus with complication, without long-term current use of insulin (HCC)    Diverticulitis    Pelvic mass    Hypothyroid    Mixed hyperlipidemia    Adrenal adenoma, left    Bilateral carpal tunnel syndrome    Bilateral hearing loss    Chronic insomnia    Diverticulosis of large intestine without hemorrhage    Gastroesophageal reflux disease without esophagitis    Osteopenia    Primary localized osteoarthritis of right hip    Primary localized osteoarthritis of right knee    Tremor, essential    Vertigo    Vitamin D deficiency    Encounter for geriatric assessment       Past Medical History  Past Medical History:   Diagnosis Date    Adrenal abnormality (HCC)     See endocrinologist for care-patient unsure of condition    Change in bowel habit     diarrhea-started end of November 2022    Chronic kidney disease     cyst on right kidney    Colon polyp     Diabetes (HCC)     Disease of thyroid gland     Diverticulitis     Dry heaves     hx of    Dysphagia     GERD (gastroesophageal reflux disease)     Hearing loss     wears B/L hearing aides    History of transfusion 1978    Tubal Pregnancy, no reactions    Hyperlipidemia     Hypertension     Irregular heart beat     last saw Dr. Marshall 2 months ago: denies chest pain, SOB or palpitations per patient  2/22/23    Low back pain     Osteoporosis scoliosis    Skipped  heart beats     checked by cardiologist (Dr. Marshall)-was told is no problem October 2021; also had ECHO and stress test    Tubal pregnancy        Past Surgical History  Past Surgical History:   Procedure Laterality Date    CARPAL TUNNEL RELEASE Bilateral     COLONOSCOPY      ECTOPIC PREGNANCY SURGERY      EGD      EYE SURGERY      Defuction of Right Eye Sac    NEUROPLASTY / TRANSPOSITION MEDIAN NERVE AT CARPAL TUNNEL BILATERAL      OSTEOTOMY TOES Left     2 and 3rd phalanges    OTHER SURGICAL HISTORY      Calcification removed from Right Thumb    DE ARTHRODESIS COMBINED TQ 1NTRSPC LUMBAR N/A 11/01/2021    Procedure: Minimally invasive transverse lumbar interbody fusion L4-5 from left-sided approach with decompressive laminectomy;  Surgeon: Stanislav Ervin MD;  Location: [facility];  Service: Neurosurgery    DE HYSTEROSCOPY BX ENDOMETRIUM&/POLYPC W/WO D&C N/A 11/25/2024    Procedure: EXAM UNDER ANESTHESIA; DILATATION AND CURETTAGE (D&C) WITH HYSTEROSCOPY. REMOVAL OF CERVICAL MASS.;  Surgeon: Simin Fritz MD;  Location:  MAIN OR;  Service: Gynecology    DE LAPAROSCOPY COLECTOMY PARTIAL W/ANASTOMOSIS N/A 1/22/2025    Procedure: SIGMOID RESECTION COLON LAPAROSCOPIC W ROBOTICS;  Surgeon: Stanislav Zarate MD;  [facility]: [facility];  Service: Colorectal          01/23/25 1125   PT Last Visit   PT Visit Date 01/23/25   Note Type   Note type Evaluation   Pain Assessment   Pain Assessment Tool 0-10   Pain Score 9   Pain Location/Orientation Location: Abdomen   Pain Onset/Description Onset: Ongoing   Effect of Pain on Daily Activities Increased time for activity   Patient's Stated Pain Goal No pain   Restrictions/Precautions   Weight Bearing Precautions Per Order No   Other Precautions Chair Alarm;Bed Alarm;Multiple lines;Fall Risk;Pain;Contact isloation   Home Living   Type of Home House   Home Layout One level;Stairs to enter with rails  (1STE)   Bathroom Shower/Tub Tub/shower unit   Bathroom Toilet Raised   Bathroom  Equipment Grab bars in shower   Home Equipment Walker  (Was not using PTA)   Additional Comments [place] home with 1STE   Prior Function   Level of [place] Independent with ADLs;Independent with functional mobility;Independent with IADLS   Lives With Alone   Receives Help From Family   IADLs Independent with driving;Independent with meal prep;Independent with medication management   Falls in the last 6 months 0   Vocational Retired   Comments Pt reports beign Ind for mobility, no AD used PTA. Pt states daughter sergei be staying with her post dc to assist as needed   General   Family/Caregiver Present No   Cognition   Overall Cognitive Status WFL   Arousal/Participation Alert   Orientation Level Oriented X4   Following Commands Follows one step commands without difficulty   Comments Pt cooperative during session, anxious regarding pain with mobility   Subjective   Subjective Agreeable ot mobilize   RLE Assessment   RLE Assessment WFL   LLE Assessment   LLE Assessment WFL   Bed Mobility   Supine to Sit 4  Minimal assistance   Additional items Assist x 1;HOB elevated;Bedrails;Increased time required;Verbal cues;LE management   Sit to Supine 4  Minimal assistance   Additional items Assist x 1;Increased time required;Verbal cues;LE management;Bedrails   Additional Comments Pt in bed upon arrival.   Transfers   Sit to Stand 4  Minimal assistance   Additional items Assist x 1;Increased time required;Verbal cues   Stand to Sit 4  Minimal assistance   Additional items Assist x 1;Increased time required;Verbal cues   Additional Comments w/RW   Ambulation/Elevation   Gait pattern Wide MARYANN;Forward Flexion   Gait Assistance 4  Minimal assist   Additional items Assist x 1;Verbal cues;Tactile cues   Assistive Device Rolling walker   Distance 50 ft   Ambulation/Elevation Additional Comments Increased use of UE 2* abd pain, ambulation completed with RW   Balance   Static Sitting Good   Dynamic Sitting Fair +   Static Standing Fair    Dynamic Standing Fair   Ambulatory Fair -   Endurance Deficit   Endurance Deficit Yes   Activity Tolerance   Activity Tolerance Patient limited by fatigue;Patient limited by pain   Nurse Made Aware RN cleared pt for therapy   Assessment   Prognosis Good   Problem List Decreased endurance;Decreased mobility;Pain   Assessment Pt is a 79 y.o. female seen for PT evaluation s/p admit to [facility] on 1/22/2025. Pt was admitted with a primary dx of: Diverticulitis  status post robotic sigmoidectomy 1/22.Pt has a past medical history of Adrenal abnormality (HCC), Change in bowel habit, Chronic kidney disease, Colon polyp, Diabetes (HCC), Disease of thyroid gland, Diverticulitis, Dry heaves, Dysphagia, GERD (gastroesophageal reflux disease), Hearing loss, History of transfusion (1978), Hyperlipidemia, Hypertension, Irregular heart beat, Low back pain, Osteoporosis (scoliosis), Skipped heart beats, and Tubal pregnancy.   PT now consulted for assessment of mobility and d/c needs. Pt with Ambulate orders.Pts current clinical presentation is Unstable/ Unpredictable (high complexity) due to Ongoing medical management for primary dx, Increased reliance on more restrictive AD compared to baseline, Decreased activity tolerance compared to baseline, Fall risk, Increased assistance needed from caregiver at current time, s/p surgical intervention. Prior to admission, pt was Ind for mobility and ADLs, no AD used pta.Pt lives alone, but reports Dtr will be staying with her post dc as long as needed. Upon evaluation, pt currently is requiring Min A X 1 for bed mobility , transfers and ambulation using RW during session, limited today 2* abd pain. Pt presents at PT eval functioning below baseline and currently w/ overall mobility deficits 2* to: decreased endurance, pain, decreased activity tolerance compared to baseline, decreased functional mobility tolerance compared to baseline, fall risk. Pt currently at a fall risk 2* to  impairments listed above.  Pt will continue to benefit from skilled acute PT interventions to address stated impairments; to maximize functional mobility; for ongoing pt/ family training; and DME needs. At conclusion of PT session pt returned BTB, bed alarm engaged, all needs in reach, and RN notified of session findings/recommendations with phone and call bell within reach. Pt denies any further questions at this time. The patient's AM-PAC Basic Mobility Inpatient Short Form Raw Score is 17. A Raw score of greater than 16 suggests the patient may benefit from discharge to home. Please also refer to the recommendation of the Physical Therapist for safe discharge planning. Recommend Level III upon hospital D/C, as anticipate pt to improve in mobility level through hospital course.   Barriers to Discharge None   Goals   Patient Goals to have less pain   STG Expiration Date 02/06/25   Short Term Goal #1 STG 1. Pt will be able to perform bed mobility tasks with Sup in order to improve overall functional mobility and assist in safe d/c. STG 2. Pt with sit EOB for at least 25 minutes at Sup level in order to strengthen abdominal musculature and assist in future transfers/ ambulation. STG 3. Pt will be able to perform functional transfer with Sup in order to improve overall functional mobility and assist in safe d/c. STG 4. Pt will be able to ambulate at least 200 feet with least restrictive device with Sup A in order to improve overall functional mobility and assist in safe d/c. STG 5. Pt will improve sitting/standing static/dynamic balance 1/2 grade in order to improve functional mobility and assist in safe d/c. STG 6. Pt will be able to negotiate at least 2 stairs with least restrictive device with sup A in order to improve overall functional mobility and assist in safe d/c.   PT Treatment Day 0   Plan   Treatment/Interventions Functional transfer training;Therapeutic exercise;Bed mobility;Gait training;Spoke to  nursing;Spoke to case management;OT;Elevations   PT Frequency 3-5x/wk   Discharge Recommendation   Rehab Resource Intensity Level, PT III (Minimum Resource Intensity)   Equipment Recommended Walker  (pt owns)   AM-PAC Basic Mobility Inpatient   Turning in Flat Bed Without Bedrails 3   Lying on Back to Sitting on Edge of Flat Bed Without Bedrails 2   Moving Bed to Chair 3   Standing Up From Chair Using Arms 3   Walk in Room 3   Climb 3-5 Stairs With Railing 3   Basic Mobility Inpatient Raw Score 17   Basic Mobility Standardized Score 39.67   [facility] Highest Level Of Mobility   -[facility] Goal 5: Stand one or more mins   -HLM Achieved 7: Walk 25 feet or more   Modified [place] Scale   Modified Yarelis Scale 4   End of Consult   Patient Position at End of Consult All needs within reach;Bed/Chair alarm activated;Supine  (Assisted back to bed 2* pt request , diue to increased abd pain)         Jacklyn Zavala, PT DPT

## 2025-01-23 NOTE — PLAN OF CARE
Problem: PHYSICAL THERAPY ADULT  Goal: Performs mobility at highest level of function for planned discharge setting.  See evaluation for individualized goals.  Description: Treatment/Interventions: Functional transfer training, Therapeutic exercise, Bed mobility, Gait training, Spoke to nursing, Spoke to case management, OT, Elevations  Equipment Recommended: Walker (pt owns)       See flowsheet documentation for full assessment, interventions and recommendations.  Note: Prognosis: Good  Problem List: Decreased endurance, Decreased mobility, Pain  Assessment: Pt is a 79 y.o. female seen for PT evaluation s/p admit to [facility] on 1/22/2025. Pt was admitted with a primary dx of: Diverticulitis  status post robotic sigmoidectomy 1/22.Pt has a past medical history of Adrenal abnormality (HCC), Change in bowel habit, Chronic kidney disease, Colon polyp, Diabetes (HCC), Disease of thyroid gland, Diverticulitis, Dry heaves, Dysphagia, GERD (gastroesophageal reflux disease), Hearing loss, History of transfusion (1978), Hyperlipidemia, Hypertension, Irregular heart beat, Low back pain, Osteoporosis (scoliosis), Skipped heart beats, and Tubal pregnancy.   PT now consulted for assessment of mobility and d/c needs. Pt with Ambulate orders.Pts current clinical presentation is Unstable/ Unpredictable (high complexity) due to Ongoing medical management for primary dx, Increased reliance on more restrictive AD compared to baseline, Decreased activity tolerance compared to baseline, Fall risk, Increased assistance needed from caregiver at current time, s/p surgical intervention. Prior to admission, pt was Ind for mobility and ADLs, no AD used pta.Pt lives alone, but reports Dtr will be staying with her post dc as long as needed. Upon evaluation, pt currently is requiring Min A X 1 for bed mobility , transfers and ambulation using RW during session, limited today 2* abd pain. Pt presents at PT eval functioning below  baseline and currently w/ overall mobility deficits 2* to: decreased endurance, pain, decreased activity tolerance compared to baseline, decreased functional mobility tolerance compared to baseline, fall risk. Pt currently at a fall risk 2* to impairments listed above.  Pt will continue to benefit from skilled acute PT interventions to address stated impairments; to maximize functional mobility; for ongoing pt/ family training; and DME needs. At conclusion of PT session pt returned BTB, bed alarm engaged, all needs in reach, and RN notified of session findings/recommendations with phone and call bell within reach. Pt denies any further questions at this time. The patient's AM-PAC Basic Mobility Inpatient Short Form Raw Score is 17. A Raw score of greater than 16 suggests the patient may benefit from discharge to home. Please also refer to the recommendation of the Physical Therapist for safe discharge planning. Recommend Level III upon hospital D/C, as anticipate pt to improve in mobility level through hospital course.  Barriers to Discharge: None     Rehab Resource Intensity Level, PT: III (Minimum Resource Intensity)    See flowsheet documentation for full assessment.

## 2025-01-23 NOTE — ASSESSMENT & PLAN NOTE
79-year-old female with diverticulitis status post robotic sigmoidectomy 1/22, recovering well    Vital signs stable within normal limits on room air     A.m. labs pending     Plan  -Advance to clears toast crackers  -Monitor for return of bowel function  -Maintenance IV fluids  -Discontinue Diallo catheter  -Discontinue PCA  -Multimodal pain regimen  -Encourage ambulation/out of bed, 3 times daily  -Encourage incentive spirometer use, 10 times per hour  -DVT prophylaxis

## 2025-01-23 NOTE — PLAN OF CARE
Problem: PAIN - ADULT  Goal: Verbalizes/displays adequate comfort level or baseline comfort level  Description: Interventions:  - Encourage patient to monitor pain and request assistance  - Assess pain using appropriate pain scale  - Administer analgesics based on type and severity of pain and evaluate response  - Implement non-pharmacological measures as appropriate and evaluate response  - Consider cultural and social influences on pain and pain management  - Notify physician/advanced practitioner if interventions unsuccessful or patient reports new pain  Outcome: Progressing     Problem: INFECTION - ADULT  Goal: Absence or prevention of progression during hospitalization  Description: INTERVENTIONS:  - Assess and monitor for signs and symptoms of infection  - Monitor lab/diagnostic results  - Monitor all insertion sites, i.e. indwelling lines, tubes, and drains  - Monitor endotracheal if appropriate and nasal secretions for changes in amount and color  - Stanley appropriate cooling/warming therapies per order  - Administer medications as ordered  - Instruct and encourage patient and family to use good hand hygiene technique  - Identify and instruct in appropriate isolation precautions for identified infection/condition  Outcome: Progressing     Problem: SAFETY ADULT  Goal: Patient will remain free of falls  Description: INTERVENTIONS:  - Educate patient/family on patient safety including physical limitations  - Instruct patient to call for assistance with activity   - Consult OT/PT to assist with strengthening/mobility   - Keep Call bell within reach  - Keep bed low and locked with side rails adjusted as appropriate  - Keep care items and personal belongings within reach  - Initiate and maintain comfort rounds  - Apply yellow socks and bracelet for high fall risk patients  - Consider moving patient to room near nurses station  Outcome: Progressing     Problem: DISCHARGE PLANNING  Goal: Discharge to home or  other facility with appropriate resources  Description: INTERVENTIONS:  - Identify barriers to discharge w/patient and caregiver  - Arrange for needed discharge resources and transportation as appropriate  - Identify discharge learning needs (meds, wound care, etc.)  - Arrange for interpretive services to assist at discharge as needed  - Refer to Case Management Department for coordinating discharge planning if the patient needs post-hospital services based on physician/advanced practitioner order or complex needs related to functional status, cognitive ability, or social support system  Outcome: Progressing

## 2025-01-23 NOTE — CASE MANAGEMENT
Case Management Assessment & Discharge Planning Note    Patient name Ama Arteaga  Location [facility] 825/[facility] 825-01 MRN [id number]  : 1945 Date 2025       Current Admission Date: 2025  Current Admission Diagnosis:Diverticulitis  Patient Active Problem List    Diagnosis Date Noted Date Diagnosed    Encounter for geriatric assessment 2025     Hypothyroid 2024     Pelvic mass 10/13/2024     Diverticulitis 10/11/2024     Hemorrhagic cystitis 2024     Controlled type 2 diabetes mellitus with complication, without long-term current use of insulin (HCC) 2024     Vertigo 2024     Nontraumatic tear of right rotator cuff 2024     Encounter for postoperative care related to surgical joint fusion 2021     Drug-induced constipation 2021     History of back surgery 2021     Hypertension      Lower back pain 2021     Spondylolisthesis of lumbar region 2021     Spinal stenosis at L4-L5 level 2021     Neurogenic claudication due to lumbar spinal stenosis 2021     Scoliosis deformity of spine 2021     Chronic right shoulder pain 2021     Osteopenia 2020     Bilateral carpal tunnel syndrome 2020     Primary localized osteoarthritis of right hip 2020     Primary localized osteoarthritis of right knee 2020     Chronic insomnia 2020     Vitamin D deficiency 2020     Mixed hyperlipidemia 2015     Adrenal adenoma, left 2015     Bilateral hearing loss 2015     Diverticulosis of large intestine without hemorrhage 2015     Gastroesophageal reflux disease without esophagitis 2015     Tremor, essential 2015       LOS (days): 1  Geometric Mean LOS (GMLOS) (days): 2.8  Days to GMLOS:1.9     OBJECTIVE:    Risk of Unplanned Readmission Score: 12.85         Current admission status: Inpatient       Preferred Pharmacy:   [facility] [address]  [address]  [address] [address]  Phone: [phone] Fax: [phone]    [facility] [place] - [place], [place] - [address] [facility] [address]  [address] [facility] [address]  [place] [address]  Phone: [phone] Fax: [phone]    Primary Care Provider: Bret Fan MD    Primary Insurance: MEDICARE  Secondary Insurance: AETNA    ASSESSMENT:  Active Health Care Proxies    There are no active Health Care Proxies on file.                 Readmission Root Cause  30 Day Readmission: No    Patient Information  Admitted from:: Home  Mental Status: Alert  During Assessment patient was accompanied by: Not accompanied during assessment  Assessment information provided by:: Patient  Primary Caregiver: Self  Support Systems: Self, Children  County of Residence: [place]  What city do you live in?: [place]  Home entry access options. Select all that apply.: No steps to enter home  Type of Current Residence: [facility]  Living Arrangements: Lives Alone  Is patient a ?: No    Activities of Daily Living Prior to Admission  Functional Status: Independent  Completes ADLs independently?: Yes  Ambulates independently?: Yes  Does patient use assisted devices?: No  Does patient currently own DME?: Yes  What DME does the patient currently own?: Walker, Bedside Commode, Wheelchair  Does patient have a history of Outpatient Therapy (PT/OT)?: No  Does the patient have a history of Short-Term Rehab?: No  Does patient have a history of HHC?: No  Does patient currently have HHC?: No         Patient Information Continued  Income Source: Pension/skilled nursing  Does patient have prescription coverage?: Yes  Does patient receive dialysis treatments?: No  Does patient have a history of substance abuse?: No  Does patient have a history of Mental Health Diagnosis?: No         Means of Transportation  Means of Transport to Appts:: Drives Self          DISCHARGE DETAILS:    Discharge planning discussed with:: patient @  bedside  Freedom of Choice: Yes     CM contacted family/caregiver?: No- see comments (pt AAOX3)  Were Treatment Team discharge recommendations reviewed with patient/caregiver?: Yes  Did patient/caregiver verbalize understanding of patient care needs?: Yes  Were patient/caregiver advised of the risks associated with not following Treatment Team discharge recommendations?: Yes    Contacts  Patient Contacts: Daughter- Danay Cummings  Relationship to Patient:: Family  Contact Method: Phone  Phone Number: [phone]  Reason/Outcome: Emergency Contact                    CM introduced self and role to patient at bedside  Patient resides alone in ranch style home ( [address] from garage, 2STE front door)  Independent with ADLS, ambulation. Does own a walker  + drives  No prior hx of HHC, OPPT Or STR  Reports her three children will be taking turns caring for her post d/c   CM to follow for needs

## 2025-01-23 NOTE — PLAN OF CARE
Problem: PAIN - ADULT  Goal: Verbalizes/displays adequate comfort level or baseline comfort level  Description: Interventions:  - Encourage patient to monitor pain and request assistance  - Assess pain using appropriate pain scale  - Administer analgesics based on type and severity of pain and evaluate response  - Implement non-pharmacological measures as appropriate and evaluate response  - Consider cultural and social influences on pain and pain management  - Notify physician/advanced practitioner if interventions unsuccessful or patient reports new pain  Outcome: Progressing     Problem: INFECTION - ADULT  Goal: Absence or prevention of progression during hospitalization  Description: INTERVENTIONS:  - Assess and monitor for signs and symptoms of infection  - Monitor lab/diagnostic results  - Monitor all insertion sites, i.e. indwelling lines, tubes, and drains  - Monitor endotracheal if appropriate and nasal secretions for changes in amount and color  - [place] appropriate cooling/warming therapies per order  - Administer medications as ordered  - Instruct and encourage patient and family to use good hand hygiene technique  - Identify and instruct in appropriate isolation precautions for identified infection/condition  Outcome: Progressing     Problem: SAFETY ADULT  Goal: Patient will remain free of falls  Description: INTERVENTIONS:  - Educate patient/family on patient safety including physical limitations  - Instruct patient to call for assistance with activity   - Consult OT/PT to assist with strengthening/mobility   - Keep Call bell within reach  - Keep bed low and locked with side rails adjusted as appropriate  - Keep care items and personal belongings within reach  - Initiate and maintain comfort rounds  - Apply yellow socks and bracelet for high fall risk patients  - Consider moving patient to room near nurses station  Outcome: Progressing

## 2025-01-23 NOTE — QUICK NOTE
"Post-op check - colorectal Surgery  Ama Arteaga 79 y.o. female MRN: [id number]  Unit/Bed#: [facility] 825-01 Encounter: [id number]    Assessment:  79 y.o. female now * Day of Surgery * s/p Procedure(s) (LRB):  SIGMOID RESECTION COLON LAPAROSCOPIC W ROBOTICS (N/A)    Plan:  - Diet Surgical; Clear Liquid; No Carbonation  -Monitor for return of bowel function  -Multimodal pain regimen  -Encourage ambulation/out of bed, 3 times daily  -Encourage incentive spirometer use, 10 times per hour  -All questions and concerns addressed    Subjective/Objective     Subjective: Patient seen and examined at bedside.  Patient expressed no acute concerns.  Patient denies postoperative nausea vomiting fevers chills and shortness of breath on room air.  Patient is tolerating clear liquids.  Patient denies bowel function.    Objective:   Vitals: Blood pressure 125/64, pulse 85, temperature 99.1 °F (37.3 °C), resp. rate 16, height 5' 2\" (1.575 m), weight 64.4 kg (142 lb), SpO2 95%.,Body mass index is 25.97 kg/m².    I/O         01/21 0701  01/22 0700 01/22 0701  01/23 0700    P.O.  120    I.V. (mL/kg)  2315.6 (36)    IV Piggyback  100    Total Intake(mL/kg)  2535.6 (39.4)    Urine (mL/kg/hr)  250    Stool  0    Blood  50    Total Output  300    Net  +2235.6          Unmeasured Stool Occurrence  0 x            Physical Exam:  General: No acute distress, resting comfortably  Neuro: alert and oriented  HEENT: moist mucous membranes  CV: Well perfused, regular rate  Lungs: Normal work of breathing, no increased respiratory effort on room air  Abdomen: Soft, non-tender, non-distended.  Incisions clean dry and intact  Extremities: No edema, clubbing or cyanosis  Skin: Warm, dry      Lab, Imaging and other studies: I have personally reviewed pertinent reports.    VTE Pharmacologic Prophylaxis: VTE covered by:  heparin (porcine), Subcutaneous, 5,000 Units at 01/22/25 213     VTE Mechanical Prophylaxis: sequential compression device    "

## 2025-01-24 LAB
ANION GAP SERPL CALCULATED.3IONS-SCNC: 3 MMOL/L (ref 4–13)
BASOPHILS # BLD AUTO: 0.03 THOUSANDS/ΜL (ref 0–0.1)
BASOPHILS NFR BLD AUTO: 0 % (ref 0–1)
BUN SERPL-MCNC: 4 MG/DL (ref 5–25)
CALCIUM SERPL-MCNC: 8.8 MG/DL (ref 8.4–10.2)
CHLORIDE SERPL-SCNC: 99 MMOL/L (ref 96–108)
CO2 SERPL-SCNC: 29 MMOL/L (ref 21–32)
CREAT SERPL-MCNC: 0.54 MG/DL (ref 0.6–1.3)
EOSINOPHIL # BLD AUTO: 0.19 THOUSAND/ΜL (ref 0–0.61)
EOSINOPHIL NFR BLD AUTO: 2 % (ref 0–6)
ERYTHROCYTE [DISTWIDTH] IN BLOOD BY AUTOMATED COUNT: 13 % (ref 11.6–15.1)
GFR SERPL CREATININE-BSD FRML MDRD: 88 ML/MIN/1.73SQ M
GLUCOSE SERPL-MCNC: 108 MG/DL (ref 65–140)
GLUCOSE SERPL-MCNC: 129 MG/DL (ref 65–140)
GLUCOSE SERPL-MCNC: 132 MG/DL (ref 65–140)
GLUCOSE SERPL-MCNC: 154 MG/DL (ref 65–140)
HCT VFR BLD AUTO: 42.1 % (ref 34.8–46.1)
HGB BLD-MCNC: 13.1 G/DL (ref 11.5–15.4)
IMM GRANULOCYTES # BLD AUTO: 0.07 THOUSAND/UL (ref 0–0.2)
IMM GRANULOCYTES NFR BLD AUTO: 1 % (ref 0–2)
LYMPHOCYTES # BLD AUTO: 0.81 THOUSANDS/ΜL (ref 0.6–4.47)
LYMPHOCYTES NFR BLD AUTO: 6 % (ref 14–44)
MCH RBC QN AUTO: 27.5 PG (ref 26.8–34.3)
MCHC RBC AUTO-ENTMCNC: 31.1 G/DL (ref 31.4–37.4)
MCV RBC AUTO: 88 FL (ref 82–98)
MONOCYTES # BLD AUTO: 0.95 THOUSAND/ΜL (ref 0.17–1.22)
MONOCYTES NFR BLD AUTO: 8 % (ref 4–12)
NEUTROPHILS # BLD AUTO: 10.6 THOUSANDS/ΜL (ref 1.85–7.62)
NEUTS SEG NFR BLD AUTO: 83 % (ref 43–75)
NRBC BLD AUTO-RTO: 0 /100 WBCS
PLATELET # BLD AUTO: 239 THOUSANDS/UL (ref 149–390)
PMV BLD AUTO: 10.7 FL (ref 8.9–12.7)
POTASSIUM SERPL-SCNC: 3.9 MMOL/L (ref 3.5–5.3)
RBC # BLD AUTO: 4.76 MILLION/UL (ref 3.81–5.12)
SODIUM SERPL-SCNC: 131 MMOL/L (ref 135–147)
WBC # BLD AUTO: 12.65 THOUSAND/UL (ref 4.31–10.16)

## 2025-01-24 PROCEDURE — 82948 REAGENT STRIP/BLOOD GLUCOSE: CPT

## 2025-01-24 PROCEDURE — 99024 POSTOP FOLLOW-UP VISIT: CPT | Performed by: COLON & RECTAL SURGERY

## 2025-01-24 PROCEDURE — 85025 COMPLETE CBC W/AUTO DIFF WBC: CPT | Performed by: PHYSICIAN ASSISTANT

## 2025-01-24 PROCEDURE — 97167 OT EVAL HIGH COMPLEX 60 MIN: CPT

## 2025-01-24 PROCEDURE — 97116 GAIT TRAINING THERAPY: CPT

## 2025-01-24 PROCEDURE — 80048 BASIC METABOLIC PNL TOTAL CA: CPT | Performed by: PHYSICIAN ASSISTANT

## 2025-01-24 PROCEDURE — 97530 THERAPEUTIC ACTIVITIES: CPT

## 2025-01-24 RX ORDER — OXYCODONE HYDROCHLORIDE 5 MG/1
5 TABLET ORAL EVERY 4 HOURS PRN
Refills: 0 | Status: DISCONTINUED | OUTPATIENT
Start: 2025-01-24 | End: 2025-01-26 | Stop reason: HOSPADM

## 2025-01-24 RX ADMIN — HEPARIN SODIUM 5000 UNITS: 5000 INJECTION INTRAVENOUS; SUBCUTANEOUS at 05:00

## 2025-01-24 RX ADMIN — METHOCARBAMOL 500 MG: 500 TABLET ORAL at 10:38

## 2025-01-24 RX ADMIN — ACETAMINOPHEN 975 MG: 325 TABLET, FILM COATED ORAL at 00:28

## 2025-01-24 RX ADMIN — HEPARIN SODIUM 5000 UNITS: 5000 INJECTION INTRAVENOUS; SUBCUTANEOUS at 13:56

## 2025-01-24 RX ADMIN — METHOCARBAMOL 500 MG: 500 TABLET ORAL at 22:19

## 2025-01-24 RX ADMIN — METHOCARBAMOL 500 MG: 500 TABLET ORAL at 17:15

## 2025-01-24 RX ADMIN — METOPROLOL SUCCINATE 50 MG: 50 TABLET, EXTENDED RELEASE ORAL at 22:19

## 2025-01-24 RX ADMIN — ATORVASTATIN CALCIUM 20 MG: 20 TABLET, FILM COATED ORAL at 22:19

## 2025-01-24 RX ADMIN — ALPRAZOLAM 0.25 MG: 0.25 TABLET ORAL at 22:24

## 2025-01-24 RX ADMIN — ACETAMINOPHEN 975 MG: 325 TABLET, FILM COATED ORAL at 06:48

## 2025-01-24 RX ADMIN — INSULIN LISPRO 1 UNITS: 100 INJECTION, SOLUTION INTRAVENOUS; SUBCUTANEOUS at 11:47

## 2025-01-24 RX ADMIN — HEPARIN SODIUM 5000 UNITS: 5000 INJECTION INTRAVENOUS; SUBCUTANEOUS at 22:19

## 2025-01-24 RX ADMIN — ACETAMINOPHEN 975 MG: 325 TABLET, FILM COATED ORAL at 17:15

## 2025-01-24 RX ADMIN — METHOCARBAMOL 500 MG: 500 TABLET ORAL at 04:59

## 2025-01-24 RX ADMIN — LEVOTHYROXINE SODIUM 25 MCG: 25 TABLET ORAL at 05:00

## 2025-01-24 NOTE — PHYSICAL THERAPY NOTE
PHYSICAL THERAPY TREATMENT  NAME:  Ama Arteaga  DATE: 01/24/25    AGE:   79 y.o.  Mrn:   [id number]  ADMIT DX:  Diverticulitis [K57.92]    Past Medical History:   Diagnosis Date    Adrenal abnormality (HCC)     See endocrinologist for care-patient unsure of condition    Change in bowel habit     diarrhea-started end of November 2022    Chronic kidney disease     cyst on right kidney    Colon polyp     Diabetes (HCC)     Disease of thyroid gland     Diverticulitis     Dry heaves     hx of    Dysphagia     GERD (gastroesophageal reflux disease)     Hearing loss     wears B/L hearing aides    History of transfusion 1978    Tubal Pregnancy, no reactions    Hyperlipidemia     Hypertension     Irregular heart beat     last saw Dr. Marshall 2 months ago: denies chest pain, SOB or palpitations per patient  2/22/23    Low back pain     Osteoporosis scoliosis    Skipped heart beats     checked by cardiologist (Dr. Marshall)-was told is no problem October 2021; also had ECHO and stress test    Tubal pregnancy      Past Surgical History:   Procedure Laterality Date    CARPAL TUNNEL RELEASE Bilateral     COLONOSCOPY      ECTOPIC PREGNANCY SURGERY      EGD      EYE SURGERY      Defuction of Right Eye Sac    NEUROPLASTY / TRANSPOSITION MEDIAN NERVE AT CARPAL TUNNEL BILATERAL      OSTEOTOMY TOES Left     2 and 3rd phalanges    OTHER SURGICAL HISTORY      Calcification removed from Right Thumb    [place] ARTHRODESIS COMBINED TQ 1NTRSPC LUMBAR N/A 11/01/2021    Procedure: Minimally invasive transverse lumbar interbody fusion L4-5 from left-sided approach with decompressive laminectomy;  Surgeon: Stanislav Ervin MD;  Location:  MAIN OR;  Service: Neurosurgery    TN HYSTEROSCOPY BX ENDOMETRIUM&/POLYPC W/WO D&C N/A 11/25/2024    Procedure: EXAM UNDER ANESTHESIA; DILATATION AND CURETTAGE (D&C) WITH HYSTEROSCOPY. REMOVAL OF CERVICAL MASS.;  Surgeon: Simin Fritz MD;  Location:  MAIN OR;  Service: Gynecology    TN LAPAROSCOPY COLECTOMY  PARTIAL W/ANASTOMOSIS N/A 1/22/2025    Procedure: SIGMOID RESECTION COLON LAPAROSCOPIC W ROBOTICS;  Surgeon: Stanislav Zarate MD;  [facility]: [facility];  Service: Colorectal       Length Of Stay: 2     01/24/25 1159   PT Last Visit   PT Visit Date 01/24/25   Note Type   Note Type Treatment   Pain Assessment   Pain Assessment Tool 0-10   Pain Score 7   Pain Location/Orientation Orientation: Right;Orientation: Lower;Location: Abdomen  (w/ mobility transition (sit> stand + bed mobility)- 0 at rest)   Pain Onset/Description Onset: Ongoing   Effect of Pain on Daily Activities inc time/ limits comfort   Patient's Stated Pain Goal No pain   Hospital Pain Intervention(s) Ambulation/increased activity;Emotional support;Repositioned   Multiple Pain Sites No   Restrictions/Precautions   Weight Bearing Precautions Per Order No   Other Precautions Chair Alarm;Bed Alarm;Multiple lines;Pain   General   Chart Reviewed Yes   Family/Caregiver Present Yes  (daugther present  for completion of session)   Cognition   Overall Cognitive Status WFL   Arousal/Participation Cooperative   Attention Attends with cues to redirect   Orientation Level Oriented X4   Following Commands Follows one step commands without difficulty   Comments cooperative and motivated- remains anxious re; pain   Subjective   Subjective pt eager to work w/ PT   Bed Mobility   Additional Comments pt OOB on approach   Transfers   Sit to Stand 5  Supervision  (> CGA by session completion w/ inc pain and fatigue)   Additional items Increased time required;Verbal cues   Stand to Sit 5  Supervision   Additional items Increased time required;Verbal cues  (cues for hand placement)   Toilet transfer 4  Minimal assistance   Additional Comments use of RW + inc time and cues for hand placement on surfaces during trnasfers- perform x6 total throughout session. education and practice on breathing through pain as pt tends to hold breath during transitions- fair carryover- pt  "greatful for \"the tips\"   Ambulation/Elevation   Gait pattern Excessively slow;Foward flexed;Short stride   Gait Assistance 5  Supervision   Additional items Verbal cues   Assistive Device Rolling walker   Distance 90+100+60 (brief standing rest breaks)   Stair Management Assistance Not tested  (pt declines need)   Balance   Static Sitting Good   Dynamic Sitting Fair +   Static Standing Fair   Dynamic Standing Fair   Ambulatory Fair -  (rw)   Endurance Deficit   Endurance Deficit Yes   Activity Tolerance   Activity Tolerance Patient limited by fatigue;Patient limited by pain   Nurse Made Aware yes- Deanne   Exercises   Knee AROM Long Arc Quad Sitting;10 reps;Right;Left   Ankle Pumps Sitting;25 reps;Right;Left   Assessment   Prognosis Good   Problem List Decreased endurance;Impaired balance;Decreased mobility;Decreased skin integrity;Pain   Assessment pt seen for PT session as above. making steady progress and requring less (A) for all phases of mobiltiy w/ use of RW and inc time- pt remains 1* limited by pain and required inc time for all functional tasks. pt required less assist than prior and was educated in breathing techniques to use during transition to assist w/ pain. pt reports daughter will be staying w/ her on d/c for \"as long as needed\" and will assist. Pt is cleared for d/c home w/ RW and [facility] PT + family support when medically stable. pt and daughter do continue to be anxious re; pts pain levels. PT will continue to follow and progress   Barriers to Discharge None   Goals   Patient Goals get better and have less pain   STG Expiration Date 02/06/25   PT Treatment Day 1   Plan   Treatment/Interventions ADL retraining;Functional transfer training;LE strengthening/ROM;Therapeutic exercise;Elevations;Endurance training;Patient/family training;Equipment eval/education;Bed mobility;Compensatory technique education;Gait training;Spoke to nursing;Spoke to case management;Spoke to advanced practitioner;OT;Family "   Progress Slow progress, decreased activity tolerance   PT Frequency 3-5x/wk   Discharge Recommendation   Rehab Resource Intensity Level, PT III (Minimum Resource Intensity)   Equipment Recommended Walker   Walker Package Recommended Wheeled walker   AM-PAC Basic Mobility Inpatient   Turning in Flat Bed Without Bedrails 3   Lying on Back to Sitting on Edge of Flat Bed Without Bedrails 3   Moving Bed to Chair 3   Standing Up From Chair Using Arms 3   Walk in Room 3   Climb 3-5 Stairs With Railing 3   Basic Mobility Inpatient Raw Score 18   Basic Mobility Standardized Score 41.05   [facility] Highest Level Of Mobility   -HLM Goal 6: Walk 10 steps or more   -HLM Achieved 8: Walk 250 feet ot more   End of Consult   Patient Position at End of Consult Bedside chair;Bed/Chair alarm activated;All needs within reach     The patient's AM-PAC Basic Mobility Inpatient Short Form Raw Score is 18. A Raw score of greater than 16 suggests the patient may benefit from discharge to home. Please also refer to the recommendation of the Physical Therapist for safe discharge planning.            Sammie Boles, PT

## 2025-01-24 NOTE — PLAN OF CARE
Problem: PAIN - ADULT  Goal: Verbalizes/displays adequate comfort level or baseline comfort level  Description: Interventions:  - Encourage patient to monitor pain and request assistance  - Assess pain using appropriate pain scale  - Administer analgesics based on type and severity of pain and evaluate response  - Implement non-pharmacological measures as appropriate and evaluate response  - Consider cultural and social influences on pain and pain management  - Notify physician/advanced practitioner if interventions unsuccessful or patient reports new pain  Outcome: Progressing     Problem: INFECTION - ADULT  Goal: Absence or prevention of progression during hospitalization  Description: INTERVENTIONS:  - Assess and monitor for signs and symptoms of infection  - Monitor lab/diagnostic results  - Monitor all insertion sites, i.e. indwelling lines, tubes, and drains  - Monitor endotracheal if appropriate and nasal secretions for changes in amount and color  - [place] appropriate cooling/warming therapies per order  - Administer medications as ordered  - Instruct and encourage patient and family to use good hand hygiene technique  - Identify and instruct in appropriate isolation precautions for identified infection/condition  Outcome: Progressing

## 2025-01-24 NOTE — OCCUPATIONAL THERAPY NOTE
Occupational Therapy Evaluation     Patient Name: Ama Arteaga  Today's Date: 1/24/2025  Problem List  Active Problems:    Diverticulitis    Past Medical History  Past Medical History:   Diagnosis Date    Adrenal abnormality (HCC)     See endocrinologist for care-patient unsure of condition    Change in bowel habit     diarrhea-started end of November 2022    Chronic kidney disease     cyst on right kidney    Colon polyp     Diabetes (HCC)     Disease of thyroid gland     Diverticulitis     Dry heaves     hx of    Dysphagia     GERD (gastroesophageal reflux disease)     Hearing loss     wears B/L hearing aides    History of transfusion 1978    Tubal Pregnancy, no reactions    Hyperlipidemia     Hypertension     Irregular heart beat     last saw Dr. Marshall 2 months ago: denies chest pain, SOB or palpitations per patient  2/22/23    Low back pain     Osteoporosis scoliosis    Skipped heart beats     checked by cardiologist (Dr. Marshall)-was told is no problem October 2021; also had ECHO and stress test    Tubal pregnancy      Past Surgical History  Past Surgical History:   Procedure Laterality Date    CARPAL TUNNEL RELEASE Bilateral     COLONOSCOPY      ECTOPIC PREGNANCY SURGERY      EGD      EYE SURGERY      Defuction of Right Eye Sac    NEUROPLASTY / TRANSPOSITION MEDIAN NERVE AT CARPAL TUNNEL BILATERAL      OSTEOTOMY TOES Left     2 and 3rd phalanges    OTHER SURGICAL HISTORY      Calcification removed from Right Thumb    [place] ARTHRODESIS COMBINED TQ 1NTRSPC LUMBAR N/A 11/01/2021    Procedure: Minimally invasive transverse lumbar interbody fusion L4-5 from left-sided approach with decompressive laminectomy;  Surgeon: Stanislav Ervin MD;  [facility];  Service: Neurosurgery    [place] HYSTEROSCOPY BX ENDOMETRIUM&/POLYPC W/WO D&C N/A 11/25/2024    Procedure: EXAM UNDER ANESTHESIA; DILATATION AND CURETTAGE (D&C) WITH HYSTEROSCOPY. REMOVAL OF CERVICAL MASS.;  Surgeon: Simin Fritz MD;  Location:  MAIN OR;   Service: Gynecology    [place] LAPAROSCOPY COLECTOMY PARTIAL W/ANASTOMOSIS N/A 1/22/2025    Procedure: SIGMOID RESECTION COLON LAPAROSCOPIC W ROBOTICS;  Surgeon: Stanislav Zarate MD;  [facility];  Service: Colorectal           01/24/25 0850   OT Last Visit   OT Visit Date 01/24/25   Note Type   Note type Evaluation   Pain Assessment   Pain Assessment Tool 0-10   Pain Score 7   Pain Location/Orientation Location: Abdomen;Location: Incision   Pain Onset/Description Onset: Ongoing;Frequency: Intermittent   Effect of Pain on Daily Activities increased pain with transitions, mobility   Patient's Stated Pain Goal No pain   Hospital Pain Intervention(s) Repositioned;Ambulation/increased activity;Emotional support   Restrictions/Precautions   Weight Bearing Precautions Per Order No   Other Precautions Chair Alarm;Bed Alarm;Multiple lines;Fall Risk;Pain   Home Living   Type of Home House   Home Layout One level;Performs ADLs on one level;Able to live on main level with bedroom/bathroom  (0STE from garage, 2STE from front)   Bathroom Shower/Tub Tub/shower unit   Bathroom Toilet Raised   Bathroom Equipment Grab bars in shower   Home Equipment Walker  (no DME used)   Additional Comments 1SH, 2STE from front, 0STE from garage, no DME used.   Prior Function   Level of [place] Independent with ADLs;Independent with functional mobility;Independent with IADLS   Lives With Alone   Receives Help From Family   IADLs Independent with driving;Independent with meal prep;Independent with medication management   Falls in the last 6 months 0   Vocational Retired   Comments Pt reports that her children will be staying with her and providing assist PRN upon d/c.   Lifestyle   Autonomy IND PTA with all ADLs/IADL, no DME used. +.   Reciprocal Relationships Lives alone, family to provide support   Service to Others Retired   Intrinsic Gratification Enjoys being active   General   Family/Caregiver Present No   Subjective  "  Subjective \"It's just the incision pain\"   ADL   Where Assessed Chair   Eating Assistance 7  Independent   Grooming Assistance 7  Independent   UB Bathing Assistance 5  Supervision/Setup   LB Bathing Assistance 4  Minimal Assistance   UB Dressing Assistance 5  Supervision/Setup   LB Dressing Assistance 4  Minimal Assistance   Toileting Assistance  4  Minimal Assistance   Bed Mobility   Supine to Sit 4  Minimal assistance   Additional items Assist x 1;HOB elevated;Bedrails;Increased time required;Verbal cues   Sit to Supine Unable to assess   Additional Comments Pt in bed upon arrival. OOB in recliner post session, all needs met, call bell within reach. +chair alarm on   Transfers   Sit to Stand 4  Minimal assistance   Additional items Assist x 1;Increased time required;Verbal cues   Stand to Sit 4  Minimal assistance   Additional items Assist x 1;Increased time required;Verbal cues   Additional Comments HHA in stance. Assist 2/2 pain   Functional Mobility   Functional Mobility 4  Minimal assistance   Additional Comments steps from bed>chair. HHA   Additional items Hand hold assistance   Balance   Static Sitting Fair +   Dynamic Sitting Fair   Static Standing Fair -   Dynamic Standing Poor +   Ambulatory Poor +   Activity Tolerance   Activity Tolerance Patient limited by fatigue;Patient limited by pain   Nurse Made Aware RN cleared for therapy, updated   RUE Assessment   RUE Assessment WFL   LUE Assessment   LUE Assessment WFL   Hand Function   Gross Motor Coordination Functional   Fine Motor Coordination Functional   Cognition   Overall Cognitive Status WFL   Arousal/Participation Alert;Cooperative   Attention Attends with cues to redirect   Orientation Level Oriented X4   Memory Within functional limits   Following Commands Follows one step commands without difficulty   Comments Pt very pleasant and cooperative. Motivated to participate. Pt largely anxious about pain.   Assessment   Limitation Decreased ADL " status;Decreased endurance;Decreased high-level ADLs;Decreased self-care trans   Prognosis Good   Assessment 79 year old pt seen today for an OT evaluation following admission to [facility] 2/2 diverticulitis, s/p robotic sigmoidectomy 1/22 with present symptoms significant for pain, fatigue, weakness, decreased ADL status, decreased functional mobility. Pt  has a past medical history of Adrenal abnormality (HCC), Change in bowel habit, Chronic kidney disease, Colon polyp, Diabetes (HCC), Disease of thyroid gland, Diverticulitis, Dry heaves, Dysphagia, GERD (gastroesophageal reflux disease), Hearing loss, History of transfusion, Hyperlipidemia, Hypertension, Irregular heart beat, Low back pain, Osteoporosis, Skipped heart beats, and Tubal pregnancy. Pt reported living alone in a 1SH, 0STE from garage or 2STE from front. Pt reports being IND with all ADLs/IADLs. No DME used. +. Family will come to stay with pt upon d/c to assist PRN. Pt very pleasant and cooperative t/o session. Pt completed functional bed mobility with min A. Min A for functional STS txfs and functional mobility with HHA. Pt was SUP for UB ADLs and Min A for LB ADLs. The patient's raw score on the AM-PAC Daily Activity Inpatient Short Form is 18. A raw score of less than 19 suggests the patient may benefit from discharge to post-acute rehabilitation services. Please refer to the recommendation of the Occupational Therapist for safe discharge planning. Pt is functioning below baseline level of function and will continue to benefit from skilled acute OT to promote increased independence and return to PLOF. At this time, current OT recommendation is Level 3 resources. Will continue to follow and assess.   Goals   Patient Goals to have less pain   LTG Time Frame 10-14   Long Term Goal #1 See below for goals   Plan   Treatment Interventions ADL retraining;Functional transfer training;Endurance training;Patient/family  training;Cognitive reorientation;Equipment evaluation/education;Fine motor coordination activities;Compensatory technique education;Continued evaluation;Energy conservation;Activityengagement   Goal Expiration Date 02/07/25   OT Frequency 2-3x/wk   Discharge Recommendation   Rehab Resource Intensity Level, OT III (Minimum Resource Intensity)   AM-PAC Daily Activity Inpatient   Lower Body Dressing 2   Bathing 2   Toileting 3   Upper Body Dressing 3   Grooming 4   Eating 4   Daily Activity Raw Score 18   Daily Activity Standardized Score (Calc for Raw Score >=11) 38.66   AM-PAC Applied Cognition Inpatient   Following a Speech/Presentation 4   Understanding Ordinary Conversation 4   Taking Medications 4   Remembering Where Things Are Placed or Put Away 4   Remembering List of 4-5 Errands 4   Taking Care of Complicated Tasks 4   Applied Cognition Raw Score 24   Applied Cognition Standardized Score 62.21   End of Consult   Education Provided Yes   Patient Position at End of Consult Bedside chair;Bed/Chair alarm activated;All needs within reach   Nurse Communication Nurse aware of consult         Occupational Therapy Goals    Pt will be Mod I with bed mobility with good sitting balance/tolerance to engage in self care tasks within this plan of care.      Pt will be Mod I for UB dressing and bathing with use of assistive devices PRN within this plan of care.     Pt will be Mod I for LB dressing and bathing with use of assistive devices PRN within this plan of care.     Pt will demonstrate standing tolerance of 2-3 minutes increase independence for toileting ADLs/tasks with use of assistive devices PRN within this plan of care.     Pt will complete functional transfers with mod I, with use of appropriate assistive devices within this plan of care.     Pt will demonstrate good carryover of safety awareness with use of appropriate assistive devices during functional tasks within this plan of care.     Pt will demonstrate  increased activity tolerance to 30 mins for participation in ADLs and functional tasks within this plan of care.     Pt will complete further functional cognitive assessment with good attention/participation to assist with safe d/c planning recommendations.        Federico Dexter MS, OTR/L     MOCA CERTIFIED RATER ID [id number]

## 2025-01-24 NOTE — PLAN OF CARE
Problem: OCCUPATIONAL THERAPY ADULT  Goal: Performs self-care activities at highest level of function for planned discharge setting.  See evaluation for individualized goals.  Description: Treatment Interventions: ADL retraining, Functional transfer training, Endurance training, Patient/family training, Cognitive reorientation, Equipment evaluation/education, Fine motor coordination activities, Compensatory technique education, Continued evaluation, Energy conservation, Activityengagement          See flowsheet documentation for full assessment, interventions and recommendations.   Note: Limitation: Decreased ADL status, Decreased endurance, Decreased high-level ADLs, Decreased self-care trans  Prognosis: Good  Assessment: 79 year old pt seen today for an OT evaluation following admission to [facility] 2/2 diverticulitis, s/p robotic sigmoidectomy 1/22 with present symptoms significant for pain, fatigue, weakness, decreased ADL status, decreased functional mobility. Pt  has a past medical history of Adrenal abnormality (HCC), Change in bowel habit, Chronic kidney disease, Colon polyp, Diabetes (HCC), Disease of thyroid gland, Diverticulitis, Dry heaves, Dysphagia, GERD (gastroesophageal reflux disease), Hearing loss, History of transfusion, Hyperlipidemia, Hypertension, Irregular heart beat, Low back pain, Osteoporosis, Skipped heart beats, and Tubal pregnancy. Pt reported living alone in a 1SH, 0STE from garage or 2STE from front. Pt reports being IND with all ADLs/IADLs. No DME used. +. Family will come to stay with pt upon d/c to assist PRN. Pt very pleasant and cooperative t/o session. Pt completed functional bed mobility with min A. Min A for functional STS txfs and functional mobility with HHA. Pt was SUP for UB ADLs and Min A for LB ADLs. The patient's raw score on the AM-PAC Daily Activity Inpatient Short Form is 18. A raw score of less than 19 suggests the patient may benefit from  discharge to post-acute rehabilitation services. Please refer to the recommendation of the Occupational Therapist for safe discharge planning. Pt is functioning below baseline level of function and will continue to benefit from skilled acute OT to promote increased independence and return to PLOF. At this time, current OT recommendation is Level 3 resources. Will continue to follow and assess.     Rehab Resource Intensity Level, OT: III (Minimum Resource Intensity)

## 2025-01-24 NOTE — PLAN OF CARE
Problem: PAIN - ADULT  Goal: Verbalizes/displays adequate comfort level or baseline comfort level  Description: Interventions:  - Encourage patient to monitor pain and request assistance  - Assess pain using appropriate pain scale  - Administer analgesics based on type and severity of pain and evaluate response  - Implement non-pharmacological measures as appropriate and evaluate response  - Consider cultural and social influences on pain and pain management  - Notify physician/advanced practitioner if interventions unsuccessful or patient reports new pain  Outcome: Progressing     Problem: INFECTION - ADULT  Goal: Absence or prevention of progression during hospitalization  Description: INTERVENTIONS:  - Assess and monitor for signs and symptoms of infection  - Monitor lab/diagnostic results  - Monitor all insertion sites, i.e. indwelling lines, tubes, and drains  - Monitor endotracheal if appropriate and nasal secretions for changes in amount and color  - [place] appropriate cooling/warming therapies per order  - Administer medications as ordered  - Instruct and encourage patient and family to use good hand hygiene technique  - Identify and instruct in appropriate isolation precautions for identified infection/condition  Outcome: Progressing

## 2025-01-24 NOTE — PLAN OF CARE
"  Problem: PHYSICAL THERAPY ADULT  Goal: Performs mobility at highest level of function for planned discharge setting.  See evaluation for individualized goals.  Description: Treatment/Interventions: Functional transfer training, Therapeutic exercise, Bed mobility, Gait training, Spoke to nursing, Spoke to case management, OT, Elevations  Equipment Recommended: Walker (pt owns)       See flowsheet documentation for full assessment, interventions and recommendations.  Outcome: Progressing  Note: Prognosis: Good  Problem List: Decreased endurance, Impaired balance, Decreased mobility, Decreased skin integrity, Pain  Assessment: pt seen for PT session as above. making steady progress and requring less (A) for all phases of mobiltiy w/ use of RW and inc time- pt remains 1* limited by pain and required inc time for all functional tasks. pt required less assist than prior and was educated in breathing techniques to use during transition to assist w/ pain. pt reports daughter will be staying w/ her on d/c for \"as long as needed\" and will assist. Pt is cleared for d/c home w/ RW and [facility] PT + family support when medically stable. pt and daughter do continue to be anxious re; pts pain levels. PT will continue to follow and progress  Barriers to Discharge: None     Rehab Resource Intensity Level, PT: III (Minimum Resource Intensity)    See flowsheet documentation for full assessment.        "

## 2025-01-24 NOTE — ASSESSMENT & PLAN NOTE
79-year-old female with diverticulitis status post robotic sigmoidectomy 1/22, recovering well passing flatus    Febrile x 1 Tmax 100.7 at 1912 on 1/23 otherwise vital signs stable within normal limits on room air    A.m. labs pending     Plan  -Advance to low residue diet  -Monitor for return of bowel function  -Stop IV fluids  -Discontinue PCA   In addition to IV and oral regimen  -Monitor fevers  -Multimodal pain regimen  -Encourage ambulation/out of bed, 3 times daily  -Encourage incentive spirometer use, 10 times per hour  -DVT prophylaxis

## 2025-01-24 NOTE — PROGRESS NOTES
Progress Note - Colorectal   Name: Ama Arteaga 79 y.o. female I MRN: [id number]  [facility] I Date of Admission: 1/22/2025   Date of Service: 1/24/2025 I Hospital Day: 2     Assessment & Plan  Diverticulitis  79-year-old female with diverticulitis status post robotic sigmoidectomy 1/22, recovering well passing flatus    Febrile x 1 Tmax 100.7 at 1912 on 1/23 otherwise vital signs stable within normal limits on room air    A.m. labs pending     Plan  -Advance to low residue diet  -Monitor for return of bowel function  -Stop IV fluids  -Discontinue PCA   In addition to IV and oral regimen  -Monitor fevers  -Multimodal pain regimen  -Encourage ambulation/out of bed, 3 times daily  -Encourage incentive spirometer use, 10 times per hour  -DVT prophylaxis    Subjective   Patient seen and examined at bedside.  Patient reports she is passing flatus, bowel movements.  Patient reports pain with ambulation and out of bed minimally.  Patient tolerated clears toast crackers without pain, nausea and vomiting.  Patient denies fevers chills shortness of breath on room air.    Objective :  Temp:  [98.6 °F (37 °C)-100.7 °F (38.2 °C)] 98.6 °F (37 °C)  HR:  [82-94] 87  BP: (101-136)/(60-66) 132/65  Resp:  [14-18] 18  SpO2:  [93 %-96 %] 95 %  O2 Device: None (Room air)    I/O         01/22 0701  01/23 0700 01/23 0701  01/24 0700    P.O. 120 840    I.V. (mL/kg) 2904.5 (45.1) 1740.3 (27)    IV Piggyback 100     Total Intake(mL/kg) 3124.5 (48.5) 2580.3 (40.1)    Urine (mL/kg/hr) 1900 2700 (1.7)    Stool 0 0    Blood 50     Total Output 1950 2700    Net +1174.5 -119.7          Unmeasured Urine Occurrence  1 x    Unmeasured Stool Occurrence 0 x 0 x            Physical Exam  Vitals and nursing note reviewed.   HENT:      Head: Normocephalic and atraumatic.      Nose: Nose normal.   Eyes:      General: No scleral icterus.     Conjunctiva/sclera: Conjunctivae normal.   Cardiovascular:      Rate and Rhythm: Normal rate.    Pulmonary:      Effort: No respiratory distress.      Comments: On room air  Abdominal:      General: Abdomen is flat. There is no distension.      Palpations: Abdomen is soft.      Tenderness: There is no abdominal tenderness. There is no guarding.      Comments:  minimal tenderness in the right lower quadrant.  Incisions clean dry and intact with skin glue   Musculoskeletal:      Cervical back: Normal range of motion.      Right lower leg: No edema.      Left lower leg: No edema.   Skin:     General: Skin is warm and dry.      Capillary Refill: Capillary refill takes less than 2 seconds.   Neurological:      Mental Status: She is alert and oriented to person, place, and time.   Psychiatric:         Mood and Affect: Mood normal.         Lab Results: I have reviewed the following results:  Recent Labs     01/23/25  0542 01/24/25  0455   WBC 9.63  --    HGB 12.2  --    HCT 38.3  --      --    SODIUM 137 131*   K 3.8 3.9    99   CO2 29 29   BUN 7 4*   CREATININE 0.62 0.54*   GLUC 108 132   MG 1.9  --    PHOS 2.6  --        Imaging Results Review: No pertinent imaging studies reviewed.  Other Study Results Review: No additional pertinent studies reviewed.    VTE Pharmacologic Prophylaxis: VTE covered by:  heparin (porcine), Subcutaneous, 5,000 Units at 01/24/25 0500      VTE Mechanical Prophylaxis: sequential compression device

## 2025-01-25 LAB
ANION GAP SERPL CALCULATED.3IONS-SCNC: 6 MMOL/L (ref 4–13)
BASOPHILS # BLD AUTO: 0.04 THOUSANDS/ΜL (ref 0–0.1)
BASOPHILS NFR BLD AUTO: 0 % (ref 0–1)
BUN SERPL-MCNC: 5 MG/DL (ref 5–25)
CALCIUM SERPL-MCNC: 9.1 MG/DL (ref 8.4–10.2)
CHLORIDE SERPL-SCNC: 104 MMOL/L (ref 96–108)
CO2 SERPL-SCNC: 28 MMOL/L (ref 21–32)
CREAT SERPL-MCNC: 0.58 MG/DL (ref 0.6–1.3)
EOSINOPHIL # BLD AUTO: 0.42 THOUSAND/ΜL (ref 0–0.61)
EOSINOPHIL NFR BLD AUTO: 4 % (ref 0–6)
ERYTHROCYTE [DISTWIDTH] IN BLOOD BY AUTOMATED COUNT: 13 % (ref 11.6–15.1)
GFR SERPL CREATININE-BSD FRML MDRD: 86 ML/MIN/1.73SQ M
GLUCOSE SERPL-MCNC: 107 MG/DL (ref 65–140)
GLUCOSE SERPL-MCNC: 108 MG/DL (ref 65–140)
GLUCOSE SERPL-MCNC: 109 MG/DL (ref 65–140)
GLUCOSE SERPL-MCNC: 118 MG/DL (ref 65–140)
GLUCOSE SERPL-MCNC: 143 MG/DL (ref 65–140)
HCT VFR BLD AUTO: 39.6 % (ref 34.8–46.1)
HGB BLD-MCNC: 12.6 G/DL (ref 11.5–15.4)
IMM GRANULOCYTES # BLD AUTO: 0.05 THOUSAND/UL (ref 0–0.2)
IMM GRANULOCYTES NFR BLD AUTO: 0 % (ref 0–2)
LYMPHOCYTES # BLD AUTO: 1.61 THOUSANDS/ΜL (ref 0.6–4.47)
LYMPHOCYTES NFR BLD AUTO: 14 % (ref 14–44)
MCH RBC QN AUTO: 27.8 PG (ref 26.8–34.3)
MCHC RBC AUTO-ENTMCNC: 31.8 G/DL (ref 31.4–37.4)
MCV RBC AUTO: 87 FL (ref 82–98)
MONOCYTES # BLD AUTO: 0.96 THOUSAND/ΜL (ref 0.17–1.22)
MONOCYTES NFR BLD AUTO: 9 % (ref 4–12)
NEUTROPHILS # BLD AUTO: 8.24 THOUSANDS/ΜL (ref 1.85–7.62)
NEUTS SEG NFR BLD AUTO: 73 % (ref 43–75)
NRBC BLD AUTO-RTO: 0 /100 WBCS
PLATELET # BLD AUTO: 222 THOUSANDS/UL (ref 149–390)
PMV BLD AUTO: 10.4 FL (ref 8.9–12.7)
POTASSIUM SERPL-SCNC: 4.1 MMOL/L (ref 3.5–5.3)
RBC # BLD AUTO: 4.54 MILLION/UL (ref 3.81–5.12)
SODIUM SERPL-SCNC: 138 MMOL/L (ref 135–147)
WBC # BLD AUTO: 11.32 THOUSAND/UL (ref 4.31–10.16)

## 2025-01-25 PROCEDURE — 85025 COMPLETE CBC W/AUTO DIFF WBC: CPT | Performed by: PHYSICIAN ASSISTANT

## 2025-01-25 PROCEDURE — 82948 REAGENT STRIP/BLOOD GLUCOSE: CPT

## 2025-01-25 PROCEDURE — 99024 POSTOP FOLLOW-UP VISIT: CPT | Performed by: COLON & RECTAL SURGERY

## 2025-01-25 PROCEDURE — NC001 PR NO CHARGE: Performed by: COLON & RECTAL SURGERY

## 2025-01-25 PROCEDURE — 80048 BASIC METABOLIC PNL TOTAL CA: CPT | Performed by: PHYSICIAN ASSISTANT

## 2025-01-25 RX ADMIN — METHOCARBAMOL 500 MG: 500 TABLET ORAL at 10:39

## 2025-01-25 RX ADMIN — HEPARIN SODIUM 5000 UNITS: 5000 INJECTION INTRAVENOUS; SUBCUTANEOUS at 13:44

## 2025-01-25 RX ADMIN — LEVOTHYROXINE SODIUM 25 MCG: 25 TABLET ORAL at 06:23

## 2025-01-25 RX ADMIN — METHOCARBAMOL 500 MG: 500 TABLET ORAL at 21:30

## 2025-01-25 RX ADMIN — HEPARIN SODIUM 5000 UNITS: 5000 INJECTION INTRAVENOUS; SUBCUTANEOUS at 06:23

## 2025-01-25 RX ADMIN — ATORVASTATIN CALCIUM 20 MG: 20 TABLET, FILM COATED ORAL at 21:30

## 2025-01-25 RX ADMIN — METHOCARBAMOL 500 MG: 500 TABLET ORAL at 17:27

## 2025-01-25 RX ADMIN — METHOCARBAMOL 500 MG: 500 TABLET ORAL at 06:23

## 2025-01-25 RX ADMIN — METOPROLOL SUCCINATE 50 MG: 50 TABLET, EXTENDED RELEASE ORAL at 21:30

## 2025-01-25 RX ADMIN — ACETAMINOPHEN 975 MG: 325 TABLET, FILM COATED ORAL at 12:01

## 2025-01-25 RX ADMIN — ACETAMINOPHEN 975 MG: 325 TABLET, FILM COATED ORAL at 06:23

## 2025-01-25 RX ADMIN — HEPARIN SODIUM 5000 UNITS: 5000 INJECTION INTRAVENOUS; SUBCUTANEOUS at 21:30

## 2025-01-25 RX ADMIN — ACETAMINOPHEN 975 MG: 325 TABLET, FILM COATED ORAL at 00:00

## 2025-01-25 RX ADMIN — ACETAMINOPHEN 975 MG: 325 TABLET, FILM COATED ORAL at 17:27

## 2025-01-25 RX ADMIN — ALPRAZOLAM 0.25 MG: 0.25 TABLET ORAL at 23:34

## 2025-01-25 NOTE — PLAN OF CARE
Problem: PAIN - ADULT  Goal: Verbalizes/displays adequate comfort level or baseline comfort level  Description: Interventions:  - Encourage patient to monitor pain and request assistance  - Assess pain using appropriate pain scale  - Administer analgesics based on type and severity of pain and evaluate response  - Implement non-pharmacological measures as appropriate and evaluate response  - Consider cultural and social influences on pain and pain management  - Notify physician/advanced practitioner if interventions unsuccessful or patient reports new pain  Outcome: Progressing     Problem: INFECTION - ADULT  Goal: Absence or prevention of progression during hospitalization  Description: INTERVENTIONS:  - Assess and monitor for signs and symptoms of infection  - Monitor lab/diagnostic results  - Monitor all insertion sites, i.e. indwelling lines, tubes, and drains  - Monitor endotracheal if appropriate and nasal secretions for changes in amount and color  - [place] appropriate cooling/warming therapies per order  - Administer medications as ordered  - Instruct and encourage patient and family to use good hand hygiene technique  - Identify and instruct in appropriate isolation precautions for identified infection/condition  Outcome: Progressing

## 2025-01-25 NOTE — PROGRESS NOTES
Patient:    MRN:  [id number]    Carmelita Request ID:  [id number]    Level of care reserved:  Home Health Agency    Partner Reserved:  [facility], [place] [address] [phone]    Clinical needs requested:    Geography searched:  [id number]    Start of Service:    Request sent:  11:55am EST on 1/25/2025 by Sivan Kumar    Partner reserved:  12:13pm EST on 1/25/2025 by Sivan Kumar    Choice list shared:  12:13pm EST on 1/25/2025 by Sivan Kumar

## 2025-01-25 NOTE — CASE MANAGEMENT
Case Management Discharge Planning Note    Patient name Ama Arteaga  Location [facility] 825/[facility] 825-01 MRN [id number]  : 1945 Date 2025       Current Admission Date: 2025  Current Admission Diagnosis:Diverticulitis  Patient Active Problem List    Diagnosis Date Noted Date Diagnosed    Encounter for geriatric assessment 2025     Hypothyroid 2024     Pelvic mass 10/13/2024     Diverticulitis 10/11/2024     Hemorrhagic cystitis 2024     Controlled type 2 diabetes mellitus with complication, without long-term current use of insulin (HCC) 2024     Vertigo 2024     Nontraumatic tear of right rotator cuff 2024     Encounter for postoperative care related to surgical joint fusion 2021     Drug-induced constipation 2021     History of back surgery 2021     Hypertension      Lower back pain 2021     Spondylolisthesis of lumbar region 2021     Spinal stenosis at L4-L5 level 2021     Neurogenic claudication due to lumbar spinal stenosis 2021     Scoliosis deformity of spine 2021     Chronic right shoulder pain 2021     Osteopenia 2020     Bilateral carpal tunnel syndrome 2020     Primary localized osteoarthritis of right hip 2020     Primary localized osteoarthritis of right knee 2020     Chronic insomnia 2020     Vitamin D deficiency 2020     Mixed hyperlipidemia 2015     Adrenal adenoma, left 2015     Bilateral hearing loss 2015     Diverticulosis of large intestine without hemorrhage 2015     Gastroesophageal reflux disease without esophagitis 2015     Tremor, essential 2015       LOS (days): 3  Geometric Mean LOS (GMLOS) (days): 2.8  Days to GMLOS:-0.1     OBJECTIVE:  Risk of Unplanned Readmission Score: 12.55         Current admission status: Inpatient   Preferred Pharmacy:   [facility] [address]  [address] [facility] [address]  Phone: [phone] Fax: [phone]    [facility] [place] [address]  [address]  [place] [address]  Phone: [phone] Fax: [phone]    Primary Care Provider: Bret Fan MD    Primary Insurance: MEDICARE  Secondary Insurance: AETNA    DISCHARGE DETAILS:    Discharge planning discussed with:: patient and daughter Danay at bedside.  [place] of Choice: Yes  Comments - Freedom of Choice: FOC discussed.  Pt and family in agreement with HHC.  CM contacted family/caregiver?: Yes  Were Treatment Team discharge recommendations reviewed with patient/caregiver?: Yes  Did patient/caregiver verbalize understanding of patient care needs?: Yes       Contacts  Patient Contacts: Daughter- Danay Cummings  Relationship to Patient:: Family  Contact Method: In Person  Reason/Outcome: Emergency Contact, Continuity of Care, Discharge Planning    Requested Home Health Care         Is the patient interested in [facility] at discharge?: Yes  Home Health Discipline requested:: Occupational Therapy, Physical Therapy  Home Health Follow-Up Provider:: PCP  Home Health Services Needed:: Evaluate Functional Status and Safety, Gait/ADL Training, Strengthening/Theraputic Exercises to Improve Function  Homebound Criteria Met:: Requires the Assistance of Another Person for Safe Ambulation or to Leave the Home, Uses an Assist Device (i.e. cane, walker, etc)  Supporting Clincal Findings:: Limited Endurance    DME Referral Provided  Referral made for DME?: No    Other Referral/Resources/Interventions Provided:  Interventions: HHC  Referral Comments: PT/OT recommend HHC at discharge for therapies, pt and her daughter in agreement.  Daughter will also be going home with patient to help when she gets home.  Referrals sent in [place].  Anticipated DC tomorrow.

## 2025-01-25 NOTE — CASE MANAGEMENT
Case Management Discharge Planning Note    Patient name Ama Arteaga  Location [facility] 825/[facility] 825-01 MRN [id number]  : 1945 Date 2025       Current Admission Date: 2025  Current Admission Diagnosis:Diverticulitis  Patient Active Problem List    Diagnosis Date Noted Date Diagnosed    Encounter for geriatric assessment 2025     Hypothyroid 2024     Pelvic mass 10/13/2024     Diverticulitis 10/11/2024     Hemorrhagic cystitis 2024     Controlled type 2 diabetes mellitus with complication, without long-term current use of insulin (HCC) 2024     Vertigo 2024     Nontraumatic tear of right rotator cuff 2024     Encounter for postoperative care related to surgical joint fusion 2021     Drug-induced constipation 2021     History of back surgery 2021     Hypertension      Lower back pain 2021     Spondylolisthesis of lumbar region 2021     Spinal stenosis at L4-L5 level 2021     Neurogenic claudication due to lumbar spinal stenosis 2021     Scoliosis deformity of spine 2021     Chronic right shoulder pain 2021     Osteopenia 2020     Bilateral carpal tunnel syndrome 2020     Primary localized osteoarthritis of right hip 2020     Primary localized osteoarthritis of right knee 2020     Chronic insomnia 2020     Vitamin D deficiency 2020     Mixed hyperlipidemia 2015     Adrenal adenoma, left 2015     Bilateral hearing loss 2015     Diverticulosis of large intestine without hemorrhage 2015     Gastroesophageal reflux disease without esophagitis 2015     Tremor, essential 2015       LOS (days): 3  Geometric Mean LOS (GMLOS) (days): 2.8  Days to GMLOS:-0.1     OBJECTIVE:  Risk of Unplanned Readmission Score: 12.58         Current admission status: Inpatient   Preferred Pharmacy:   [facility] [address]  [address] [facility] [address]  Phone: [phone] Fax: [phone]    [facility] [place] - [address] [facility] [address]  [address] [facility] [address]  [place] [address]  Phone: [phone] Fax: [phone]    Primary Care Provider: Bret Fan MD    Primary Insurance: MEDICARE  Secondary Insurance: AETNA    DISCHARGE DETAILS:    Discharge planning discussed with:: Patient and Danay  Freedom of Choice: Yes  Comments - Freedom of Choice: FOC discussed.  Pt and family in agreement with HHC.  CM contacted family/caregiver?: Yes  Were Treatment Team discharge recommendations reviewed with patient/caregiver?: Yes  Did patient/caregiver verbalize understanding of patient care needs?: Yes       Contacts  Patient Contacts: Daughter- Danay Cummings  Relationship to Patient:: Family  Contact Method: In Person  Reason/Outcome: Emergency Contact, Continuity of Care, Discharge Planning    Requested Home Health Care         Is the patient interested in [facility] at discharge?: Yes  Home Health Discipline requested:: Occupational Therapy, Physical Therapy  Home Health Agency Name:: [facility]  Home Health Follow-Up Provider:: PCP  Home Health Services Needed:: Evaluate Functional Status and Safety, Gait/ADL Training, Strengthening/Theraputic Exercises to Improve Function  Homebound Criteria Met:: Requires the Assistance of Another Person for Safe Ambulation or to Leave the Home, Uses an Assist Device (i.e. cane, walker, etc)  Supporting Clincal Findings:: Limited Endurance    DME Referral Provided  Referral made for DME?: No    Other Referral/Resources/Interventions Provided:  Interventions: HHC  Referral Comments: HHC reserved with [facility].  Patient, family, and, provider updated.

## 2025-01-25 NOTE — ASSESSMENT & PLAN NOTE
79-year-old female with diverticulitis status post robotic sigmoidectomy 1/22, recovering well passing flatus    Febrile x 1 Tmax 100.9 at 1550 on 1/24 otherwise vital signs stable within normal limits on room air    WBC 11.3 from 12.6    Plan  -Continue low residual diet  -Continue oral pain regimen as needed  -Monitor fever curve; if afebrile for 24 hours by this afternoon we will consider discharge  -Encourage ambulation/out of bed, 3 times daily  -Encourage incentive spirometer use, 10 times per hour  -DVT prophylaxis

## 2025-01-25 NOTE — PROGRESS NOTES
Progress Note - Colorectal   Name: Ama Arteaga 79 y.o. female I MRN: [id number]  [facility] Date of Admission: 1/22/2025   Date of Service: 1/25/2025 I Hospital Day: 3     Assessment & Plan  Diverticulitis  79-year-old female with diverticulitis status post robotic sigmoidectomy 1/22, recovering well passing flatus    Febrile x 1 Tmax 100.9 at 1550 on 1/24 otherwise vital signs stable within normal limits on room air    WBC 11.3 from 12.6    Plan  -Continue low residual diet  -Continue oral pain regimen as needed  -Monitor fever curve; if afebrile for 24 hours by this afternoon we will consider discharge  -Encourage ambulation/out of bed, 3 times daily  -Encourage incentive spirometer use, 10 times per hour  -DVT prophylaxis    Subjective   Patient seen and examined at bedside.  Patient reports she is passing flatus, bowel movements.  Patient reports pain with ambulation has only minimally ambulated although was cleared by physical therapy for minimal needs on discharge.    Objective :  Temp:  [98.7 °F (37.1 °C)-100.9 °F (38.3 °C)] 98.7 °F (37.1 °C)  HR:  [] 88  BP: (105-143)/(55-74) 139/70  Resp:  [15-17] 17  SpO2:  [95 %-97 %] 96 %    I/O         01/22 0701  01/23 0700 01/23 0701  01/24 0700    P.O. 120 840    I.V. (mL/kg) 2904.5 (45.1) 1740.3 (27)    IV Piggyback 100     Total Intake(mL/kg) 3124.5 (48.5) 2580.3 (40.1)    Urine (mL/kg/hr) 1900 2700 (1.7)    Stool 0 0    Blood 50     Total Output 1950 2700    Net +1174.5 -119.7          Unmeasured Urine Occurrence  1 x    Unmeasured Stool Occurrence 0 x 0 x            Physical Exam  Vitals and nursing note reviewed.   HENT:      Head: Normocephalic and atraumatic.      Nose: Nose normal.   Eyes:      General: No scleral icterus.     Conjunctiva/sclera: Conjunctivae normal.   Cardiovascular:      Rate and Rhythm: Normal rate.   Pulmonary:      Effort: No respiratory distress.      Comments: On room air  Abdominal:      General: Abdomen is flat.  There is no distension.      Palpations: Abdomen is soft.      Tenderness: There is no abdominal tenderness. There is no guarding.      Comments: Appropriate abdominal tenderness, nondistended; incisions clean dry and intact   Musculoskeletal:      Cervical back: Normal range of motion.      Right lower leg: No edema.      Left lower leg: No edema.   Skin:     General: Skin is warm and dry.      Capillary Refill: Capillary refill takes less than 2 seconds.   Neurological:      Mental Status: She is alert and oriented to person, place, and time.   Psychiatric:         Mood and Affect: Mood normal.         Lab Results: I have reviewed the following results:  Recent Labs     01/23/25  0542 01/24/25  0455 01/24/25  0923 01/25/25  0618   WBC 9.63  --    < > 11.32*   HGB 12.2  --    < > 12.6   HCT 38.3  --    < > 39.6     --    < > 222   SODIUM 137 131*  --   --    K 3.8 3.9  --   --     99  --   --    CO2 29 29  --   --    BUN 7 4*  --   --    CREATININE 0.62 0.54*  --   --    GLUC 108 132  --   --    MG 1.9  --   --   --    PHOS 2.6  --   --   --     < > = values in this interval not displayed.       Imaging Results Review: No pertinent imaging studies reviewed.  Other Study Results Review: No additional pertinent studies reviewed.    VTE Pharmacologic Prophylaxis: VTE covered by:  heparin (porcine), Subcutaneous, 5,000 Units at 01/25/25 0623      VTE Mechanical Prophylaxis: sequential compression device

## 2025-01-26 VITALS
RESPIRATION RATE: 18 BRPM | SYSTOLIC BLOOD PRESSURE: 139 MMHG | TEMPERATURE: 98.6 F | WEIGHT: 142 LBS | HEIGHT: 62 IN | BODY MASS INDEX: 26.13 KG/M2 | OXYGEN SATURATION: 94 % | DIASTOLIC BLOOD PRESSURE: 76 MMHG | HEART RATE: 90 BPM

## 2025-01-26 LAB
GLUCOSE SERPL-MCNC: 120 MG/DL (ref 65–140)
GLUCOSE SERPL-MCNC: 180 MG/DL (ref 65–140)

## 2025-01-26 PROCEDURE — 82948 REAGENT STRIP/BLOOD GLUCOSE: CPT

## 2025-01-26 RX ORDER — OXYCODONE AND ACETAMINOPHEN 5; 325 MG/1; MG/1
1 TABLET ORAL EVERY 4 HOURS PRN
Qty: 15 TABLET | Refills: 0 | Status: SHIPPED | OUTPATIENT
Start: 2025-01-26 | End: 2025-01-31

## 2025-01-26 RX ADMIN — METHOCARBAMOL 500 MG: 500 TABLET ORAL at 10:47

## 2025-01-26 RX ADMIN — HEPARIN SODIUM 5000 UNITS: 5000 INJECTION INTRAVENOUS; SUBCUTANEOUS at 05:55

## 2025-01-26 RX ADMIN — INSULIN LISPRO 1 UNITS: 100 INJECTION, SOLUTION INTRAVENOUS; SUBCUTANEOUS at 11:33

## 2025-01-26 RX ADMIN — LEVOTHYROXINE SODIUM 25 MCG: 25 TABLET ORAL at 05:57

## 2025-01-26 RX ADMIN — ACETAMINOPHEN 975 MG: 325 TABLET, FILM COATED ORAL at 05:58

## 2025-01-26 RX ADMIN — METHOCARBAMOL 500 MG: 500 TABLET ORAL at 05:58

## 2025-01-26 NOTE — PROGRESS NOTES
Progress Note - Colorectal   Name: Ama Arteaga 79 y.o. female I MRN: [id number]  Unit/Bed#: [facility] 825-01 I Date of Admission: 1/22/2025   Date of Service: 1/26/2025 I Hospital Day: 4     Assessment & Plan  Diverticulitis  79-year-old female with diverticulitis status post robotic sigmoidectomy 1/22, now recovering well with bowel function, tolerating a diet    Vital signs stable within normal limits on room air, afebrile    A.m. labs pending    Plan  -Continue low residual diet  -Continue oral pain regimen as needed  -Encourage ambulation/out of bed, 3 times daily  -Encourage incentive spirometer use, 10 times per hour  -Case management set up home health rehab  -DVT prophylaxis  -Discharge home today      Subjective   Patient seen and examined at bedside.  Patient is ready to go home today.  Patient expressed no acute concerns.  Patient is having bowel movements and tolerating a diet.  No abdominal pain.    Objective :  Temp:  [98.2 °F (36.8 °C)-98.7 °F (37.1 °C)] 98.4 °F (36.9 °C)  HR:  [88-95] 91  BP: (125-141)/(63-76) 141/76  Resp:  [16-18] 18  SpO2:  [93 %-96 %] 95 %    I/O         01/24 0701  01/25 0700 01/25 0701  01/26 0700    P.O. 240 950    I.V. (mL/kg) 0.3 (0)     Total Intake(mL/kg) 240.3 (3.7) 950 (14.8)    Urine (mL/kg/hr) 1025 (0.7) 350 (0.4)    Stool  0    Total Output 1025 350    Net -784.7 +600          Unmeasured Urine Occurrence 1 x     Unmeasured Stool Occurrence  1 x            Physical Exam  Vitals and nursing note reviewed.   Cardiovascular:      Rate and Rhythm: Normal rate.   Pulmonary:      Effort: Pulmonary effort is normal. No respiratory distress.   Abdominal:      General: Abdomen is flat. There is no distension.      Palpations: Abdomen is soft.      Tenderness: There is abdominal tenderness. There is no guarding.   Skin:     General: Skin is warm.      Capillary Refill: Capillary refill takes less than 2 seconds.   Neurological:      Mental Status: She is alert.   Psychiatric:          Mood and Affect: Mood normal.         Lab Results: I have reviewed the following results:  Recent Labs     01/25/25  0618   WBC 11.32*   HGB 12.6   HCT 39.6      SODIUM 138   K 4.1      CO2 28   BUN 5   CREATININE 0.58*   GLUC 108       Imaging Results Review: No pertinent imaging studies reviewed.  Other Study Results Review: No additional pertinent studies reviewed.    VTE Pharmacologic Prophylaxis: VTE covered by:  heparin (porcine), Subcutaneous, 5,000 Units at 01/26/25 0555      VTE Mechanical Prophylaxis: sequential compression device

## 2025-01-26 NOTE — DISCHARGE SUMMARY
Discharge Summary - Surgery-General   Name: Ama Arteaga 79 y.o. female I MRN: [id number]  [facility] Date of Admission: 1/22/2025   Date of Service: 1/26/2025 I Hospital Day: 4  { ?Quick Links I Problem List I [facility] I Billing Tip:48099}  {sl ip specialty discharge summaries:68981}

## 2025-01-26 NOTE — ASSESSMENT & PLAN NOTE
79-year-old female with diverticulitis status post robotic sigmoidectomy 1/22, now recovering well with bowel function, tolerating a diet    Vital signs stable within normal limits on room air, afebrile    A.m. labs pending    Plan  -Continue low residual diet  -Continue oral pain regimen as needed  -Encourage ambulation/out of bed, 3 times daily  -Encourage incentive spirometer use, 10 times per hour  -Case management set up home health rehab  -DVT prophylaxis  -Discharge home today

## 2025-01-26 NOTE — DISCHARGE INSTR - AVS FIRST PAGE
Please follow-up with Dr. Zarate on 2/21     Activity:  - No lifting greater than 20 pounds or strenuous physical activity or exercise for 2 weeks.  - Walking and normal light activities are encouraged.  - Normal daily activities including climbing steps are okay.  - No driving until no longer using pain medications.     Diet:    - You may resume your normal diet.     Wound Care:  - May shower daily. No tub baths or swimming until cleared by your surgeon.  - Wash incision gently with soap and water and pat dry.  - Do not apply any creams or ointments unless instructed to do so by your surgeon.  - You may apply ice as needed (no longer than 20 minutes at a time) for the first 48 hours.  - Bruising is not unusual and will go away with a little time. You may apply a warm, moist compress that may help the bruising resolve quicker.  - You may remove the dressings the day after surgery (unless otherwise instructed). Leave any skin tapes (steri-strips) on the incision(s) in place until they fall off on their own. Any new dressings are optional.     Medications:    - You may resume all of your regular medications, including blood thinners and aspirin, after going home unless otherwise instructed. Please refer to your discharge medication list for further details.  - Please take the pain medications as directed.  - You are encouraged to use non-narcotic pain medications first and whenever possible. Reserve the use of narcotic pain medication for moderate to severe pain not controlled by non-narcotic medications.  - No driving while taking narcotic pain medications.  - You may become constipated, especially if taking pain medications. You may take any over the counter stool softeners or laxatives as needed. Examples: Milk of Magnesia, Colace, Senna.    Additional Instructions:  - If you have any questions or concerns after discharge please call the office.  - Call office or return to ER if fever greater than 101, chills,  persistent nausea/vomiting, worsening/uncontrollable pain, and/or increasing redness or purulent/foul smelling drainage from incision(s).

## 2025-01-27 ENCOUNTER — HOME CARE VISIT (OUTPATIENT)
Dept: HOME HEALTH SERVICES | Facility: HOME HEALTHCARE | Age: 80
End: 2025-01-27

## 2025-01-27 PROCEDURE — 88307 TISSUE EXAM BY PATHOLOGIST: CPT | Performed by: PATHOLOGY

## 2025-01-28 ENCOUNTER — HOME CARE VISIT (OUTPATIENT)
Dept: HOME HEALTH SERVICES | Facility: HOME HEALTHCARE | Age: 80
End: 2025-01-28

## 2025-01-28 ENCOUNTER — RESULTS FOLLOW-UP (OUTPATIENT)
Dept: OTHER | Facility: HOSPITAL | Age: 80
End: 2025-01-28

## 2025-01-28 NOTE — CASE COMMUNICATION
Notification of Assess not Admit    [facility] services and has determined the patient is not eligible for service due to the following  No eligible Home Health skill    Arrived at pts home and pts daughter present. Pt is able to ambulate without DME (I)ly, able to perform bed mobility and transfers (I)ly. Pt reports mild pain at ABD incision site when reaching and bending forward. Pt is abl e to get dressed with occasional asssistance from daughter with donning and doffing socks, shoes and pants. Education and instruction given to pt and pts daughter concerning adaptive equipment to A with dressing. pt declined at this time.  Pts daughter is staying with pt for a week. Pts ABD incision is clear, dry and intact.  Pt has all current EPIC medications in the home. pt and pts daughter currently have no questions and/or concerns  about medication regimen. pt is only taking tylenol as needed for pain.  No skilled home PT and no skilled home OT needed at this time. Education and instruction given to pt and pts daughter to reach out to family providers with further questions and concerns. pt and pts daughter agree.    Thank you,  Beatriz Olsen, DPT, COS-S

## 2025-02-01 PROBLEM — Z01.89 ENCOUNTER FOR GERIATRIC ASSESSMENT: Status: RESOLVED | Noted: 2025-01-02 | Resolved: 2025-02-01

## 2025-02-21 ENCOUNTER — OFFICE VISIT (OUTPATIENT)
Age: 80
End: 2025-02-21

## 2025-02-21 VITALS
WEIGHT: 140 LBS | BODY MASS INDEX: 25.76 KG/M2 | DIASTOLIC BLOOD PRESSURE: 68 MMHG | HEIGHT: 62 IN | SYSTOLIC BLOOD PRESSURE: 164 MMHG

## 2025-02-21 DIAGNOSIS — K57.92 DIVERTICULITIS: Primary | ICD-10-CM

## 2025-02-21 PROCEDURE — 99024 POSTOP FOLLOW-UP VISIT: CPT | Performed by: COLON & RECTAL SURGERY

## 2025-02-21 NOTE — ASSESSMENT & PLAN NOTE
Patient presents for follow-up.  She is approximately 1 month status post robotic assisted sigmoid colectomy for recurrent diverticulitis.  She is doing well.  She notes a slight change in stool caliber but is otherwise normal.  Incisions are clean dry and intact.  She may resume normal activities and diet as tolerated.  Plan will be for flexible sigmoidoscopy in 6 to 12 months.

## 2025-02-21 NOTE — PROGRESS NOTES
Diagnoses and all orders for this visit:    Diverticulitis       Diverticulitis  Patient presents for follow-up.  She is approximately 1 month status post robotic assisted sigmoid colectomy for recurrent diverticulitis.  She is doing well.  She notes a slight change in stool caliber but is otherwise normal.  Incisions are clean dry and intact.  She may resume normal activities and diet as tolerated.  Plan will be for flexible sigmoidoscopy in 6 to 12 months.      ARGENIS Arteaga is a 79 y.o. female who presents for a post operative evaluation.     The patient is status post laparoscopic with robotics sigmoid colon resection on 1/22/25 for diverticulitis.    Operative Findings:  Significant inflammatory changes of the descending colon sigmoid junction and sigmoid colon.  Left-sided adnexal adhesions to the left portion of the sigmoid colon.  Otherwise normal pelvic exam.    Final Diagnosis:  A. Sigmoid colon (resection):  - Diverticulitis with abscess   - Six (6) reactive lymph nodes (0/6)  - Negative for dysplasia     She states that she is doing well post operatively.     Her appetite is good.     She reports a change in stool caliber. She states that her stools are skinny post operatively. She denies rectal bleeding and blood in the stool.     She denies pain and discomfort.     Lab Results   Component Value Date    WBC 11.32 (H) 01/25/2025    HGB 12.6 01/25/2025    HCT 39.6 01/25/2025    MCV 87 01/25/2025     01/25/2025     Lab Results   Component Value Date    SODIUM 138 01/25/2025    K 4.1 01/25/2025     01/25/2025    CO2 28 01/25/2025    AGAP 6 01/25/2025    BUN 5 01/25/2025    CREATININE 0.58 (L) 01/25/2025    GLUC 108 01/25/2025    GLUF 104 (H) 01/08/2025    CALCIUM 9.1 01/25/2025    AST 12 (L) 10/14/2024    ALT 12 10/14/2024    ALKPHOS 69 10/14/2024    TP 5.7 (L) 10/14/2024    TBILI 0.73 10/14/2024    EGFR 86 01/25/2025     Past Medical History:   Diagnosis Date    Adrenal abnormality  (HCC)     See endocrinologist for care-patient unsure of condition    Change in bowel habit     diarrhea-started end of November 2022    Chronic kidney disease     cyst on right kidney    Colon polyp     Diabetes (HCC)     Disease of thyroid gland     Diverticulitis     Dry heaves     hx of    Dysphagia     GERD (gastroesophageal reflux disease)     Hearing loss     wears B/L hearing aides    History of transfusion 1978    Tubal Pregnancy, no reactions    Hyperlipidemia     Hypertension     Irregular heart beat     last saw Dr. Marshall 2 months ago: denies chest pain, SOB or palpitations per patient  2/22/23    Low back pain     Osteoporosis scoliosis    Skipped heart beats     checked by cardiologist (Dr. Marshall)-was told is no problem October 2021; also had ECHO and stress test    Tubal pregnancy      Past Surgical History:   Procedure Laterality Date    CARPAL TUNNEL RELEASE Bilateral     COLONOSCOPY      ECTOPIC PREGNANCY SURGERY      EGD      EYE SURGERY      Defuction of Right Eye Sac    NEUROPLASTY / TRANSPOSITION MEDIAN NERVE AT CARPAL TUNNEL BILATERAL      OSTEOTOMY TOES Left     2 and 3rd phalanges    OTHER SURGICAL HISTORY      Calcification removed from Right Thumb    MN ARTHRODESIS COMBINED TQ 1NTRSPC LUMBAR N/A 11/01/2021    Procedure: Minimally invasive transverse lumbar interbody fusion L4-5 from left-sided approach with decompressive laminectomy;  Surgeon: Stanislav Ervin MD;  [facility];  Service: Neurosurgery    MN HYSTEROSCOPY BX ENDOMETRIUM&/POLYPC W/WO D&C N/A 11/25/2024    Procedure: EXAM UNDER ANESTHESIA; DILATATION AND CURETTAGE (D&C) WITH HYSTEROSCOPY. REMOVAL OF CERVICAL MASS.;  Surgeon: Simin Fritz MD;  Location:  MAIN OR;  Service: Gynecology    MN LAPAROSCOPY COLECTOMY PARTIAL W/ANASTOMOSIS N/A 1/22/2025    Procedure: SIGMOID RESECTION COLON LAPAROSCOPIC W ROBOTICS;  Surgeon: Stanislav Zarate MD;  [facility];  Service: Colorectal       Current Outpatient  Medications:     ALPRAZolam (XANAX) 0.25 mg tablet, Take 0.25 mg by mouth daily at bedtime as needed, Disp: , Rfl:     atorvastatin (LIPITOR) 20 mg tablet, Take 20 mg by mouth daily at bedtime , Disp: , Rfl:     B Complex Vitamins (VITAMIN B COMPLEX PO), Take by mouth, Disp: , Rfl:     Coenzyme Q10 (CO Q 10 PO), Take by mouth, Disp: , Rfl:     levothyroxine 25 mcg tablet, Take 25 mcg by mouth daily in the early morning , Disp: , Rfl:     metFORMIN (GLUCOPHAGE-XR) 500 mg 24 hr tablet, Take 500 mg by mouth 2 (two) times a day with meals, Disp: , Rfl:     metoprolol succinate (TOPROL-XL) 50 mg 24 hr tablet, Take 50 mg by mouth daily at bedtime, Disp: , Rfl:     polyethylene glycol (MIRALAX) 17 g packet, Take 17 g by mouth daily as needed (constipation), Disp: , Rfl:     VITAMIN D PO, Take by mouth, Disp: , Rfl:     acetaminophen (TYLENOL) 325 mg tablet, Take 2 tablets (650 mg total) by mouth every 6 (six) hours as needed for mild pain (Patient not taking: Reported on 2/21/2025), Disp: , Rfl:   Allergies as of 02/21/2025    (No Known Allergies)     Review of Systems   All other systems reviewed and are negative.    Vitals:    02/21/25 1318   BP: 164/68     Physical Exam  Constitutional:       Appearance: Normal appearance.   HENT:      Head: Normocephalic and atraumatic.   Eyes:      Pupils: Pupils are equal, round, and reactive to light.   Abdominal:      General: Abdomen is flat.      Palpations: Abdomen is soft.      Comments: Well-healed surgical incisions   Neurological:      Mental Status: She is alert.

## 2025-04-09 ENCOUNTER — TELEPHONE (OUTPATIENT)
Age: 80
End: 2025-04-09

## 2025-04-09 ENCOUNTER — PREP FOR PROCEDURE (OUTPATIENT)
Age: 80
End: 2025-04-09

## 2025-04-09 DIAGNOSIS — K57.92 DIVERTICULITIS: ICD-10-CM

## 2025-04-09 DIAGNOSIS — Z90.49 HISTORY OF COLON RESECTION: Primary | ICD-10-CM

## 2025-04-09 NOTE — LETTER
Ama Arteaga  [address] [address]      FLEXIBLE SIGMOIDOSCOPY INSTRUCTIONS  PLEASE NOTE...  AS OF JUNE 1, 2014, OUR OFFICE REQUIRES 72 HOURS NOTICE OF CANCELLATION/RESCHEDULE OF A PROCEDURE TO AVOID INCURRING A MISSED APPOINTMENT FEE.    Your Flexible Sigmoidoscopy Procedure has been scheduled at:  [facility]   [address] [place] [address]    The date of your procedure is: Friday, August 15, 2025    The hospital facility will contact you the evening prior to your scheduled procedure with your arrival time.     In preparation for this procedure, you will begin taking the two (2) Fleet Enemas two (2) hours prior to your appointment.  These enemas can be purchased in most pharmacies without a prescription.  Follow the directions on the Fleet enema box, then repeat for second enema.      Nothing to eat after midnight the day prior to the procedure.    Check with your family doctor if you are taking a blood thinner (Coumadin, Plavix, Xarelto, Pradaxa, Gingko biloba, Ginseng, Feverfew, [place]'s Wort).  We suggest stopping these for 3 days.  Special instructions may be needed if you are taking aspirin or any aspirin-containing medication.  Check with your family physician.      If you are on DIABETIC MEDICATION (tablets or insulin) your doctor may make changes in your preparation.    Take all medications usual unless otherwise instructed.    As always, if you have any questions or concerns, feel free to call the office.  Our  staff will be happy to connect you with one of the nurses to answer any questions or address any concerns you have regarding your procedure.      Do not wear any jewelry the day of your procedure including wedding rings.    Arrangements must be made for a responsible party to drive you home from the procedure.  If you do not have a responsible family member or friend to drive you home, the procedure will not be  done.  Vast or a taxi is not acceptable.  It is important you notify our office of any insurance changes prior to your procedure and, if necessary, supply us with referrals from your primary care physician.

## 2025-04-10 ENCOUNTER — HOSPITAL ENCOUNTER (OUTPATIENT)
Dept: PULMONOLOGY | Facility: HOSPITAL | Age: 80
End: 2025-04-10
Payer: MEDICARE

## 2025-04-10 DIAGNOSIS — R06.02 SHORTNESS OF BREATH: ICD-10-CM

## 2025-04-10 PROCEDURE — 94060 EVALUATION OF WHEEZING: CPT

## 2025-04-10 PROCEDURE — 94729 DIFFUSING CAPACITY: CPT | Performed by: INTERNAL MEDICINE

## 2025-04-10 PROCEDURE — 94760 N-INVAS EAR/PLS OXIMETRY 1: CPT

## 2025-04-10 PROCEDURE — 94726 PLETHYSMOGRAPHY LUNG VOLUMES: CPT | Performed by: INTERNAL MEDICINE

## 2025-04-10 PROCEDURE — 94060 EVALUATION OF WHEEZING: CPT | Performed by: INTERNAL MEDICINE

## 2025-04-10 PROCEDURE — 94726 PLETHYSMOGRAPHY LUNG VOLUMES: CPT

## 2025-04-10 PROCEDURE — 94729 DIFFUSING CAPACITY: CPT

## 2025-04-10 RX ORDER — ALBUTEROL SULFATE 0.83 MG/ML
2.5 SOLUTION RESPIRATORY (INHALATION) ONCE
Status: COMPLETED | OUTPATIENT
Start: 2025-04-10 | End: 2025-04-10

## 2025-04-10 RX ADMIN — ALBUTEROL SULFATE 2.5 MG: 2.5 SOLUTION RESPIRATORY (INHALATION) at 15:21

## 2025-05-15 ENCOUNTER — NURSE TRIAGE (OUTPATIENT)
Age: 80
End: 2025-05-15

## 2025-05-15 NOTE — TELEPHONE ENCOUNTER
"FOLLOW UP: recommendations     REASON FOR CONVERSATION: Abdominal Pain    SYMPTOMS: Patient calling with intermittent LLQ pain 1-2/10 starting several days ago.  Feels like when she had diverticulitis prior to surgery in January.  Denies fevers, nausea, vomiting, bowel issues.  No changes except for pain.      OTHER: patient is concerned diverticulitis is back.  Asking for sooner OV.  Schedule visit for July.     DISPOSITION: 24 Hour Provider Response    Reason for Disposition   The patient has LLQ pain >3 days    Answer Assessment - Initial Assessment Questions  1. When did the pain start?   Several days ago  2. Is the pain constant or intermittent and does the pain radiate? If so, where does it radiate?   intermittent  3. Is your LLQ (lower left quadrant) pain tender to touch or worsen after pressing on the abdomen?   yes  4. PAIN SEVERITY: If present, ask: \"How bad is the pain?\"  (e.g., Scale 1-10; mild, moderate, or severe)      - MILD (1-3): does not interfere with normal activities, abdomen soft and not tender to touch    - MODERATE (4-7): interferes with normal activities or awakens from sleep, tender to touch   - SEVERE (8-10): excruciating pain, doubled over, unable to do any normal activities   1-2/10   5. Does anything make the pain worse? Does anything make the pain better?   denies  6. Do you have a history of diverticulitis? If yes, what was your prior treatment for diverticulitis?   Yes, had surgery in January  7. Are you passing gas?   yes  8. Have you noticed a change in your bowels?   denies  9. Have you had any black or bloody stools?   denies  10. Do you have a fever?   denies  11. Are you able to eat and drink without difficulty?   yes    Protocols used: GI-Left Lower Quadrant Pain (Diverticulitis)-ADULT-[place]    "

## 2025-05-30 ENCOUNTER — OFFICE VISIT (OUTPATIENT)
Dept: OBGYN CLINIC | Facility: CLINIC | Age: 80
End: 2025-05-30
Payer: MEDICARE

## 2025-05-30 ENCOUNTER — APPOINTMENT (OUTPATIENT)
Dept: RADIOLOGY | Facility: AMBULARY SURGERY CENTER | Age: 80
End: 2025-05-30
Attending: ORTHOPAEDIC SURGERY
Payer: MEDICARE

## 2025-05-30 VITALS — WEIGHT: 142 LBS | HEIGHT: 62 IN | BODY MASS INDEX: 26.13 KG/M2

## 2025-05-30 DIAGNOSIS — M25.551 RIGHT HIP PAIN: ICD-10-CM

## 2025-05-30 DIAGNOSIS — M76.891 TENDINITIS INVOLVING RIGHT HIP ABDUCTORS: ICD-10-CM

## 2025-05-30 DIAGNOSIS — M25.552 LEFT HIP PAIN: ICD-10-CM

## 2025-05-30 DIAGNOSIS — M25.552 LEFT HIP PAIN: Primary | ICD-10-CM

## 2025-05-30 PROCEDURE — 73522 X-RAY EXAM HIPS BI 3-4 VIEWS: CPT

## 2025-05-30 PROCEDURE — 99214 OFFICE O/P EST MOD 30 MIN: CPT | Performed by: ORTHOPAEDIC SURGERY

## 2025-05-30 RX ORDER — NYSTATIN AND TRIAMCINOLONE ACETONIDE 100000; 1 [USP'U]/G; MG/G
OINTMENT TOPICAL 2 TIMES DAILY
COMMUNITY

## 2025-05-30 NOTE — ASSESSMENT & PLAN NOTE
Orders:    Ambulatory referral to Physical Therapy; Future  Physical exam, diagnostic imaging, and subjective history are all most consistent with the above diagnosis. The pathoanatomy and natural history of this diagnosis was explained to the patient in detail.   Order placed today for patient to initiate outpatient Physical Therapy if pain is persistent.  Recommend antiinflammatories as needed for pain relief.

## 2025-05-30 NOTE — PROGRESS NOTES
Name: Ama Arteaga      : 1945      MRN: [id number]  Encounter Provider: Kenneth Mercado DO  Encounter Date: 2025   Encounter department: [facility] [place]      Assessment: Ama Arteaga is a 80 y.o. female with right hip pain.    :  Assessment & Plan  Right hip pain    Orders:    XR hips bilateral 3-4 vw w pelvis if performed; Future    Tendinitis involving right hip abductors    Orders:    Ambulatory referral to Physical Therapy; Future  Physical exam, diagnostic imaging, and subjective history are all most consistent with the above diagnosis. The pathoanatomy and natural history of this diagnosis was explained to the patient in detail.   Order placed today for patient to initiate outpatient Physical Therapy if pain is persistent.  Recommend antiinflammatories as needed for pain relief.        Subjective:  Ama Arteaga is a 80 y.o. female who presents today with complaints of right hip pain that began about 1 week ago after cleaning a lot. Patient denies specific injury. Patient states that pain has been improving over the week and is now a 2/10.  Patient denies radiating pain, numbness, tingling, Patient denies groin pain.      The following portions of the patient's history were reviewed and updated as appropriate: allergies, current medications, past family history, past social history, past surgical history and problem list.      Social History     Socioeconomic History    Marital status: /Civil Union     Spouse name: Not on file    Number of children: Not on file    Years of education: Not on file    Highest education level: Not on file   Occupational History    Not on file   Tobacco Use    Smoking status: Former     Current packs/day: 0.00     Types: Cigarettes     Quit date:      Years since quittin.4    Smokeless tobacco: Never    Tobacco comments:     quit 40 years ago   Vaping Use    Vaping status: Never Used   Substance and Sexual Activity     Alcohol use: Not Currently    Drug use: Not Currently    Sexual activity: Not Currently   Other Topics Concern    Not on file   Social History Narrative    Not on file     Social Drivers of Health     Financial Resource Strain: Not on file   Food Insecurity: No Food Insecurity (2025)    Nursing - Inadequate Food Risk Classification     Worried About Running Out of Food in the Last Year: Never true     Ran Out of Food in the Last Year: Never true     Ran Out of Food in the Last Year: Never true   Transportation Needs: No Transportation Needs (2025)    Nursing - Transportation Risk Classification     Lack of Transportation: Not on file     Lack of Transportation: No   Physical Activity: Not on file   Stress: Not on file   Social Connections: Not on file   Intimate Partner Violence: Unknown (2025)    Nursing IPS     Feels Physically and Emotionally Safe: Not on file     Physically Hurt by Someone: Not on file     Humiliated or Emotionally Abused by Someone: Not on file     Physically Hurt by Someone: No     Hurt or Threatened by Someone: No   Housing Stability: Unknown (2025)    Nursing: Inadequate Housing Risk Classification     Has Housing: Not on file     Worried About Losing Housing: Not on file     Unable to Get Utilities: Not on file     Unable to Pay for Housing in the Last Year: No     Has Housin     Past Medical History[1]  Past Surgical History[2]  Allergies[3]  Medications Ordered Prior to Encounter[4]         Objective:    Review of Systems  Pertinent items are noted in HPI.  All other systems were reviewed and are negative.    Physical Exam  Cons: Appears well.  No apparent distress.  Psych: Alert. Oriented x3.  Mood and affect normal.  Eyes: PERRLA, EOMI  Resp: Normal effort.  No audible wheezing or stridor.  CV: Palpable pulse.  No discernable arrhythmia.  No LE edema.  Lymph:  No palpable cervical, axillary, or inguinal lymphadenopathy.  Skin: Warm.  No palpable masses.  No  "visible lesions.  Neuro: Normal muscle tone.  Normal and symmetric DTR's.    BMI:   Estimated body mass index is 25.97 kg/m² as calculated from the following:    Height as of this encounter: 5' 2\" (1.575 m).    Weight as of this encounter: 64.4 kg (142 lb).  Lab Results   Component Value Date    HGBA1C 6.1 (H) 01/08/2025         Right Hip Exam     Tenderness   Right hip tenderness location: TTP to right iliac spine at attachment of abductors.    Range of Motion   The patient has normal right hip ROM.    Other   Erythema: absent  Sensation: normal  Pulse: present    Comments:  +pain with stretch of abductors            Procedures  Procedures  No Procedures performed today    Diagnostics, reviewed and taken today if performed as documented:  I have personally reviewed pertinent films in PACS and my interpretation is HO noted over greater trochanter, mild arthritis noted of hip.            Portions of the record may have been created with voice recognition software.  Occasional wrong word or \"sound a like\" substitutions may have occurred due to the inherent limitations of voice recognition software.  Read the chart carefully and recognize, using context, where substitutions have occurred.         [1]   Past Medical History:  Diagnosis Date    Adrenal abnormality (HCC)     See endocrinologist for care-patient unsure of condition    Change in bowel habit     diarrhea-started end of November 2022    Chronic kidney disease     cyst on right kidney    Colon polyp     Diabetes (HCC)     Disease of thyroid gland     Diverticulitis     Dry heaves     hx of    Dysphagia     GERD (gastroesophageal reflux disease)     Hearing loss     wears B/L hearing aides    History of transfusion 1978    Tubal Pregnancy, no reactions    Hyperlipidemia     Hypertension     Irregular heart beat     last saw Dr. Marshall 2 months ago: denies chest pain, SOB or palpitations per patient  2/22/23    Low back pain     Osteoporosis scoliosis    Skipped " heart beats     checked by cardiologist (Dr. Marshall)-was told is no problem October 2021; also had ECHO and stress test    Tubal pregnancy    [2]   Past Surgical History:  Procedure Laterality Date    CARPAL TUNNEL RELEASE Bilateral     COLONOSCOPY      ECTOPIC PREGNANCY SURGERY      EGD      EYE SURGERY      Defuction of Right Eye Sac    NEUROPLASTY / TRANSPOSITION MEDIAN NERVE AT CARPAL TUNNEL BILATERAL      OSTEOTOMY TOES Left     2 and 3rd phalanges    OTHER SURGICAL HISTORY      Calcification removed from Right Thumb    LA ARTHRODESIS COMBINED TQ 1NTRSPC LUMBAR N/A 11/01/2021    Procedure: Minimally invasive transverse lumbar interbody fusion L4-5 from left-sided approach with decompressive laminectomy;  Surgeon: Stanislav Ervin MD;  [facility];  Service: Neurosurgery    LA HYSTEROSCOPY BX ENDOMETRIUM&/POLYPC W/WO D&C N/A 11/25/2024    Procedure: EXAM UNDER ANESTHESIA; DILATATION AND CURETTAGE (D&C) WITH HYSTEROSCOPY. REMOVAL OF CERVICAL MASS.;  Surgeon: Simin Fritz MD;  Location:  MAIN OR;  Service: Gynecology    LA LAPAROSCOPY COLECTOMY PARTIAL W/ANASTOMOSIS N/A 1/22/2025    Procedure: SIGMOID RESECTION COLON LAPAROSCOPIC W ROBOTICS;  Surgeon: Stanislav Zarate MD;  [facility];  Service: Colorectal   [3] No Known Allergies  [4]   Current Outpatient Medications on File Prior to Visit   Medication Sig Dispense Refill    acetaminophen (TYLENOL) 325 mg tablet Take 2 tablets (650 mg total) by mouth every 6 (six) hours as needed for mild pain      ALPRAZolam (XANAX) 0.25 mg tablet Take 0.25 mg by mouth daily at bedtime as needed      atorvastatin (LIPITOR) 20 mg tablet Take 20 mg by mouth daily at bedtime      B Complex Vitamins (VITAMIN B COMPLEX PO) Take by mouth      Coenzyme Q10 (CO Q 10 PO) Take by mouth      levothyroxine 25 mcg tablet Take 25 mcg by mouth daily in the early morning      metFORMIN (GLUCOPHAGE-XR) 500 mg 24 hr tablet Take 500 mg by mouth in the morning and 500 mg in  the evening. Take with meals.      metoprolol succinate (TOPROL-XL) 50 mg 24 hr tablet Take 50 mg by mouth daily at bedtime      polyethylene glycol (MIRALAX) 17 g packet Take 17 g by mouth daily as needed (constipation)      VITAMIN D PO Take by mouth      nystatin-triamcinolone (MYCOLOG-II) ointment Apply topically 2 (two) times a day       No current facility-administered medications on file prior to visit.

## 2025-06-11 ENCOUNTER — OFFICE VISIT (OUTPATIENT)
Dept: OBGYN CLINIC | Facility: CLINIC | Age: 80
End: 2025-06-11
Payer: MEDICARE

## 2025-06-11 VITALS
BODY MASS INDEX: 26.68 KG/M2 | HEIGHT: 62 IN | SYSTOLIC BLOOD PRESSURE: 120 MMHG | DIASTOLIC BLOOD PRESSURE: 60 MMHG | WEIGHT: 145 LBS

## 2025-06-11 DIAGNOSIS — N95.2 VAGINAL ATROPHY: Primary | ICD-10-CM

## 2025-06-11 PROCEDURE — 99212 OFFICE O/P EST SF 10 MIN: CPT | Performed by: OBSTETRICS & GYNECOLOGY

## 2025-06-12 NOTE — PROGRESS NOTES
"Assessment/Plan:     There are no diagnoses linked to this encounter.      79-year-old female  Cervical mass status post removal pathology fibroid uterus  Elevated endometrial thickness benign pathology  Diverticulitis s had surgery January 2025  Plan  Pap and HPV neg 2025 no more pap  needed   Return to office in 6 months for follow-up and annual exam      Subjective:      Patient ID: Ama Arteaga is a 80 y.o. female.    HPI  80-year-old female presented office today follow-up and cervical mass/fibroid removal and endometrial thickness/hysteroscopy D&C  Denies any complaint today  Denies any postmenopausal bleeding  Denies any pelvic pain  Denies any urgency frequency or dysuria    The following portions of the patient's history were reviewed and updated as appropriate: allergies, current medications, past family history, past medical history, past social history, past surgical history and problem list.    Review of Systems      Objective:      /60 (BP Location: Left arm, Patient Position: Sitting, Cuff Size: Adult)   Ht 5' 2\" (1.575 m)   Wt 65.8 kg (145 lb)   BMI 26.52 kg/m²          Physical Exam  Constitutional:       Appearance: She is well-developed.   Abdominal:      General: There is no distension.      Palpations: Abdomen is soft.      Tenderness: There is no abdominal tenderness.   Genitourinary:     Labia:         Right: No rash, tenderness or lesion.         Left: No rash, tenderness or lesion.       Vagina: No signs of injury. No vaginal discharge, erythema or tenderness.      Cervix: No cervical motion tenderness, discharge or friability.      Adnexa:         Right: No mass, tenderness or fullness.          Left: No mass, tenderness or fullness.        Comments: Cervix looks normal no lesion or other abnormality noted reassurance given    Neurological:      Mental Status: She is alert and oriented to person, place, and time.     Psychiatric:         Behavior: Behavior normal.         "

## 2025-08-01 ENCOUNTER — ANESTHESIA EVENT (OUTPATIENT)
Dept: ANESTHESIOLOGY | Facility: HOSPITAL | Age: 80
End: 2025-08-01

## 2025-08-01 ENCOUNTER — ANESTHESIA (OUTPATIENT)
Dept: ANESTHESIOLOGY | Facility: HOSPITAL | Age: 80
End: 2025-08-01

## 2025-08-15 ENCOUNTER — ANESTHESIA (OUTPATIENT)
Dept: GASTROENTEROLOGY | Facility: AMBULARY SURGERY CENTER | Age: 80
End: 2025-08-15
Payer: MEDICARE

## 2025-08-15 ENCOUNTER — HOSPITAL ENCOUNTER (OUTPATIENT)
Dept: GASTROENTEROLOGY | Facility: AMBULARY SURGERY CENTER | Age: 80
Setting detail: OUTPATIENT SURGERY
Discharge: HOME | End: 2025-08-15
Attending: COLON & RECTAL SURGERY
Payer: MEDICARE

## 2025-08-15 VITALS
RESPIRATION RATE: 18 BRPM | SYSTOLIC BLOOD PRESSURE: 126 MMHG | HEIGHT: 62 IN | TEMPERATURE: 98 F | WEIGHT: 143 LBS | OXYGEN SATURATION: 96 % | HEART RATE: 76 BPM | BODY MASS INDEX: 26.31 KG/M2 | DIASTOLIC BLOOD PRESSURE: 60 MMHG

## 2025-08-15 DIAGNOSIS — Z90.49 HISTORY OF COLON RESECTION: ICD-10-CM

## 2025-08-15 DIAGNOSIS — K57.92 DIVERTICULITIS: ICD-10-CM

## 2025-08-15 LAB — GLUCOSE SERPL-MCNC: 112 MG/DL (ref 65–140)

## 2025-08-15 PROCEDURE — 45330 DIAGNOSTIC SIGMOIDOSCOPY: CPT | Performed by: COLON & RECTAL SURGERY

## 2025-08-15 PROCEDURE — 82948 REAGENT STRIP/BLOOD GLUCOSE: CPT

## (undated) DEVICE — TROCAR PORT ACCESS 5 X120MML W/BLDLS OPTICAL TIP AIRSEAL

## (undated) DEVICE — BETHLEHEM UNIVERSAL SPINE, KIT: Brand: CARDINAL HEALTH

## (undated) DEVICE — DRAPE SHEET X-LG

## (undated) DEVICE — CIRCULAR STAPLER WITH TRI-STAPLE TECHNOLOGY: Brand: EEA

## (undated) DEVICE — STERILE EMESIS BASIN                 070: Brand: CARDINAL HEALTH

## (undated) DEVICE — SEAL

## (undated) DEVICE — DISPOSABLE EQUIPMENT COVER: Brand: SMALL TOWEL DRAPE

## (undated) DEVICE — INTENDED FOR TISSUE SEPARATION, AND OTHER PROCEDURES THAT REQUIRE A SHARP SURGICAL BLADE TO PUNCTURE OR CUT.: Brand: BARD-PARKER SAFETY BLADES SIZE 15, STERILE

## (undated) DEVICE — ELECTRODE BLADE MOD  E-Z CLEAN 6.5IN -0014M

## (undated) DEVICE — CANNULA SEAL

## (undated) DEVICE — SUT VICRYL 1 CT-1 27 IN J261H

## (undated) DEVICE — ADHESIVE SKIN CLOSURE SYS EXOFIN FUSION 22CM

## (undated) DEVICE — DRESSING MEPILEX AG BORDER 4 X 4 IN

## (undated) DEVICE — GLOVE SRG BIOGEL 8

## (undated) DEVICE — GLOVE INDICATOR PI UNDERGLOVE SZ 7.5 BLUE

## (undated) DEVICE — FIRST STEP BEDSIDE KIT - STAND-UP POUCH, ENDOSCOPIC CLEANING PAD - 1 POUCH: Brand: FIRST STEP BEDSIDE KIT - STAND-UP POUCH, ENDOSCOPIC CLEANING PAD

## (undated) DEVICE — ARM DRAPE

## (undated) DEVICE — INSTRUMENT 8670015 PAK 2 NEEDLES TROCAR

## (undated) DEVICE — SUREFORM 45 RELOAD BLUE: Brand: SUREFORM

## (undated) DEVICE — SNAP KOVER: Brand: UNBRANDED

## (undated) DEVICE — ANTIBACTERIAL VIOLET BRAIDED (POLYGLACTIN 910), SYNTHETIC ABSORBABLE SUTURE: Brand: COATED VICRYL

## (undated) DEVICE — INTENDED FOR TISSUE SEPARATION, AND OTHER PROCEDURES THAT REQUIRE A SHARP SURGICAL BLADE TO PUNCTURE OR CUT.: Brand: BARD-PARKER ® CARBON RIB-BACK BLADES

## (undated) DEVICE — ACCESS PLATFORM FOR MINIMALLY INVASIVE SURGERY.: Brand: GELPORT® LAPAROSCOPIC  SYSTEM

## (undated) DEVICE — MONOPOLAR CURVED SCISSORS: Brand: ENDOWRIST

## (undated) DEVICE — DURASEAL EXTENDED APPLICATOR TIP 15CM

## (undated) DEVICE — GLOVE SRG BIOGEL 7.5

## (undated) DEVICE — CHLORHEXIDINE 4PCT 4 OZ

## (undated) DEVICE — 2, DISPOSABLE SUCTION/IRRIGATOR WITHOUT DISPOSABLE TIP: Brand: STRYKEFLOW

## (undated) DEVICE — TISSUE REMOVAL SYSTEM FLUID MANAGEMENT ACCESSORIES: Brand: SYMPHION

## (undated) DEVICE — DRAPE C-ARMOUR

## (undated) DEVICE — STRL ALLENTOWN HYSTEROSCOPY PK: Brand: CARDINAL HEALTH

## (undated) DEVICE — REDUCER: Brand: ENDOWRIST

## (undated) DEVICE — AIRSEAL TUBE SMOKE EVAC LUMENX3 FILTERED

## (undated) DEVICE — ANTIBACTERIAL UNDYED BRAIDED (POLYGLACTIN 910), SYNTHETIC ABSORBABLE SUTURE: Brand: COATED VICRYL

## (undated) DEVICE — TIP COVER ACCESSORY

## (undated) DEVICE — FENESTRATED BIPOLAR FORCEPS: Brand: ENDOWRIST

## (undated) DEVICE — EXOFIN PRECISION PEN HIGH VISCOSITY TOPICAL SKIN ADHESIVE: Brand: EXOFIN PRECISION PEN, 1G

## (undated) DEVICE — TOOL 15BA50 LEGEND 15CM 5MM BA: Brand: MIDAS REX™

## (undated) DEVICE — POOLE SUCTION HANDLE: Brand: CARDINAL HEALTH

## (undated) DEVICE — DURASEAL® EXACT SPINAL SEALANT SYSTEM 3ML 5 PACK: Brand: DURASEAL EXACT SPINAL SEALANT SYSTEM

## (undated) DEVICE — GUIDEWIRE 2345050 BLUNT THREADED 24IN

## (undated) DEVICE — DRAPE MICROSCOPE OPMI PENTERO

## (undated) DEVICE — 40595 XL TRENDELENBURG POSITIONING KIT: Brand: 40595 XL TRENDELENBURG POSITIONING KIT

## (undated) DEVICE — ELECTRODE BALL E-Z CLEAN 5 IN -0009

## (undated) DEVICE — LIGHT HANDLE COVER SLEEVE DISP BLUE STELLAR

## (undated) DEVICE — COLUMN DRAPE

## (undated) DEVICE — ELECTRO LUBE IS A SINGLE PATIENT USE DEVICE THAT IS INTENDED TO BE USED ON ELECTROSURGICAL ELECTRODES TO REDUCE STICKING.: Brand: KEY SURGICAL ELECTRO LUBE

## (undated) DEVICE — MONITORING SPINAL IMPULSE CASE FEE

## (undated) DEVICE — SUREFORM 45 CURVED-TIP: Brand: SUREFORM

## (undated) DEVICE — GLOVE PI ULTRA TOUCH SZ.6.5

## (undated) DEVICE — Device

## (undated) DEVICE — VESSEL SEALER EXTEND: Brand: ENDOWRIST

## (undated) DEVICE — TIBURON SPLIT SHEET: Brand: CONVERTORS

## (undated) DEVICE — CO2 AND WATER TUBING/CAP SET FOR OLYMPUS® SCOPES & UCR: Brand: ERBE

## (undated) DEVICE — MEDI-VAC NON-CONDUCTIVE SUCTION TUBING 6MM X 1.8M (6FT.) L: Brand: CARDINAL HEALTH

## (undated) DEVICE — SUPPLY FEE STD

## (undated) DEVICE — SUT MONOCRYL 4-0 PS-2 18 IN Y496G

## (undated) DEVICE — TUBING SUCTION 5MM X 12 FT

## (undated) DEVICE — PENCIL ELECTROSURG E-Z CLEAN -0035H

## (undated) DEVICE — VISUALIZATION SYSTEM: Brand: CLEARIFY

## (undated) DEVICE — LUBRICANT JELLY SURGILUBE TUBE 4OZ FLIP TOP

## (undated) DEVICE — SURGIFOAM 8.5 X 12.5

## (undated) DEVICE — TROCAR SITE CLOSURE DEVICE: Brand: ENDO CLOSE

## (undated) DEVICE — BLADELESS OBTURATOR: Brand: WECK VISTA

## (undated) DEVICE — TRAY FOLEY 16FR URIMETER SURESTEP

## (undated) DEVICE — SPONGE PVP SCRUB WING STERILE

## (undated) DEVICE — PAD GROUNDING DUAL ADULT

## (undated) DEVICE — MEDI-VAC YANKAUER SUCTION HANDLE W/STRAIGHT TIP & CONTROL VENT: Brand: CARDINAL HEALTH

## (undated) DEVICE — BETHLEHEM MAJOR GENERAL PACK: Brand: CARDINAL HEALTH

## (undated) DEVICE — PREMIUM DRY TRAY LF: Brand: MEDLINE INDUSTRIES, INC.

## (undated) DEVICE — PREP SURGICAL PURPREP 26ML

## (undated) DEVICE — SUT PDS PLUS 1 CTX 36IN PDP371T

## (undated) DEVICE — TIP-UP FENESTRATED GRASPER: Brand: ENDOWRIST

## (undated) DEVICE — GLOVE INDICATOR PI UNDERGLOVE SZ 8.5 BLUE

## (undated) DEVICE — SUT PROLENE 2-0 KS 30 IN 8623H

## (undated) DEVICE — SUT VICRYL PLUS 3-0 RB-1 CR/8 18 IN VCP713D

## (undated) DEVICE — TOWEL SURG XR DETECT GREEN STRL RFD

## (undated) DEVICE — Device: Brand: DEFENDO AIR/WATER/SUCTION AND BIOPSY VALVE